# Patient Record
Sex: MALE | Race: OTHER | HISPANIC OR LATINO | Employment: FULL TIME | URBAN - METROPOLITAN AREA
[De-identification: names, ages, dates, MRNs, and addresses within clinical notes are randomized per-mention and may not be internally consistent; named-entity substitution may affect disease eponyms.]

---

## 2019-01-26 ENCOUNTER — HOSPITAL ENCOUNTER (EMERGENCY)
Facility: HOSPITAL | Age: 24
Discharge: HOME | End: 2019-01-26
Attending: EMERGENCY MEDICINE | Admitting: EMERGENCY MEDICINE

## 2019-01-26 VITALS
OXYGEN SATURATION: 100 % | DIASTOLIC BLOOD PRESSURE: 89 MMHG | WEIGHT: 315 LBS | SYSTOLIC BLOOD PRESSURE: 143 MMHG | RESPIRATION RATE: 18 BRPM | HEART RATE: 98 BPM | TEMPERATURE: 98.5 F

## 2019-01-26 DIAGNOSIS — J02.9 VIRAL PHARYNGITIS: Primary | ICD-10-CM

## 2019-01-26 PROCEDURE — 99282 EMERGENCY DEPT VISIT SF MDM: CPT

## 2019-01-26 NOTE — DISCHARGE INSTRUCTIONS
Pharyngitis   WHAT YOU NEED TO KNOW:   Pharyngitis, or sore throat, is inflammation of the tissues and structures in your pharynx (throat)  Pharyngitis is most often caused by bacteria  It may also be caused by a cold or flu virus  Other causes include smoking, allergies, or acid reflux  DISCHARGE INSTRUCTIONS:   Call 911 for any of the following:   · You have trouble breathing or swallowing because your throat is swollen or sore  Return to the emergency department if:   · You are drooling because it hurts too much to swallow  · Your fever is higher than 102? F (39?C) or lasts longer than 3 days  · You are confused  · You taste blood in your throat  Contact your healthcare provider if:   · Your throat pain gets worse  · You have a painful lump in your throat that does not go away after 5 days  · Your symptoms do not improve after 5 days  · You have questions or concerns about your condition or care  Medicines:  Viral pharyngitis will go away on its own without treatment  Your sore throat should start to feel better in 3 to 5 days for both viral and bacterial infections  You may need any of the following:  · Antibiotics  treat a bacterial infection  · NSAIDs , such as ibuprofen, help decrease swelling, pain, and fever  NSAIDs can cause stomach bleeding or kidney problems in certain people  If you take blood thinner medicine, always ask your healthcare provider if NSAIDs are safe for you  Always read the medicine label and follow directions  · Acetaminophen  decreases pain and fever  It is available without a doctor's order  Ask how much to take and how often to take it  Follow directions  Acetaminophen can cause liver damage if not taken correctly  · Take your medicine as directed  Contact your healthcare provider if you think your medicine is not helping or if you have side effects  Tell him or her if you are allergic to any medicine   Keep a list of the medicines, vitamins, and herbs you take  Include the amounts, and when and why you take them  Bring the list or the pill bottles to follow-up visits  Carry your medicine list with you in case of an emergency  Manage your symptoms:   · Gargle salt water  Mix ¼ teaspoon salt in an 8 ounce glass of warm water and gargle  This may help decrease swelling in your throat  · Drink liquids as directed  You may need to drink more liquids than usual  Liquids may help soothe your throat and prevent dehydration  Ask how much liquid to drink each day and which liquids are best for you  · Use a cool-steam humidifier  to help moisten the air in your room and calm your cough  · Soothe your throat  with cough drops, ice, soft foods, or popsicles  Prevent the spread of pharyngitis:  Cover your mouth and nose when you cough or sneeze  Do not share food or drinks  Wash your hands often  Use soap and water  If soap and water are unavailable, use an alcohol based hand   Follow up with your healthcare provider as directed:  Write down your questions so you remember to ask them during your visits  © 2017 [facility] Information is for End User's use only and may not be sold, redistributed or otherwise used for commercial purposes  All illustrations and images included in CareNotes® are the copyrighted property of [employer]  or [employer]  The above information is an  only  It is not intended as medical advice for individual conditions or treatments  Talk to your doctor, nurse or pharmacist before following any medical regimen to see if it is safe and effective for you  Pharyngitis   WHAT YOU NEED TO KNOW:   What is pharyngitis? Pharyngitis, or sore throat, is inflammation of the tissues and structures in your pharynx (throat)  Pharyngitis is most often caused by bacteria  It may also be caused by a cold or flu virus  Other causes include smoking, allergies, or acid reflux     What signs and symptoms may occur with pharyngitis? · Sore throat or pain when you swallow    · Fever, chills, and body aches    · Hoarse or raspy voice    · Cough, runny or stuffy nose, itchy or watery eyes    · Headache    · Upset stomach and loss of appetite    · Mild neck stiffness    · Swollen glands that feel like hard lumps when you touch your neck    · White and yellow pus-filled blisters in the back of your throat  How is pharyngitis diagnosed? Tell your healthcare provider about your symptoms  He may look inside your throat and feel your neck  You may also need the following tests:  · A throat culture  may show which germ is causing your sore throat  A cotton swab is rubbed against the back of your throat  · Blood tests  may be used to show if another medical condition is causing your sore throat  How is pharyngitis treated? Viral pharyngitis will go away on its own without treatment  Your sore throat should start to feel better in 3 to 5 days for both viral and bacterial infections  You may need any of the following:  · Antibiotics  treat a bacterial infection  · NSAIDs , such as ibuprofen, help decrease swelling, pain, and fever  NSAIDs can cause stomach bleeding or kidney problems in certain people  If you take blood thinner medicine, always ask your healthcare provider if NSAIDs are safe for you  Always read the medicine label and follow directions  · Acetaminophen  decreases pain and fever  It is available without a doctor's order  Ask how much to take and how often to take it  Follow directions  Acetaminophen can cause liver damage if not taken correctly  How can I manage my symptoms? · Gargle salt water  Mix ¼ teaspoon salt in an 8 ounce glass of warm water and gargle  This may help decrease swelling in your throat  · Drink liquids as directed  You may need to drink more liquids than usual  Liquids may help soothe your throat and prevent dehydration   Ask how much liquid to drink each day and which liquids are best for you  · Use a cool-steam humidifier  to help moisten the air in your room and decrease your cough  · Soothe your throat  with cough drops, ice, soft foods, or popsicles  How can I prevent the spread of pharyngitis? Cover your mouth and nose when you cough or sneeze  Do not share food or drinks  Wash your hands often  Use soap and water  If soap and water are unavailable, use an alcohol based hand   Call 911 for any of the following:   · You have trouble breathing or swallowing because your throat is swollen or sore  When should I seek immediate care? · You are drooling because it hurts too much to swallow  · Your fever is higher than 102? F (39?C) or lasts longer than 3 days  · You are confused  · You taste blood in your throat  When should I contact my healthcare provider? · Your throat pain gets worse  · You have a painful lump in your throat that does not go away after 5 days  · Your symptoms do not improve after 5 days  · You have questions or concerns about your condition or care  CARE AGREEMENT:   You have the right to help plan your care  Learn about your health condition and how it may be treated  Discuss treatment options with your caregivers to decide what care you want to receive  You always have the right to refuse treatment  The above information is an  only  It is not intended as medical advice for individual conditions or treatments  Talk to your doctor, nurse or pharmacist before following any medical regimen to see if it is safe and effective for you  © 2017 [employer] [employer] Information is for End User's use only and may not be sold, redistributed or otherwise used for commercial purposes  All illustrations and images included in CareNotes® are the copyrighted property of A [employer] A M [employer] Inc  or [employer]

## 2019-01-26 NOTE — ED PROVIDER NOTES
History  Chief Complaint   Patient presents with    Sore Throat     sore throat for two days, denies sick contacts, denies fevers     Patient presents for 2 days of sore throat  States it started with flu-like symptoms which are now gone  States he felt hot at home but did take his temperature  History provided by:  Patient   used: No    Sore Throat   Location:  Generalized  Quality:  Sore  Severity:  Moderate  Onset quality:  Gradual  Duration:  2 days  Timing:  Constant  Progression:  Worsening  Chronicity:  New  Relieved by:  Nothing  Worsened by:  Drinking and swallowing  Ineffective treatments:  OTC medications  Associated symptoms: chills, fever, postnasal drip and rhinorrhea    Associated symptoms: no cough, no rash and no trouble swallowing    Risk factors: no exposure to strep, no exposure to mono and no sick contacts        None       History reviewed  No pertinent past medical history  No past surgical history on file  History reviewed  No pertinent family history  I have reviewed and agree with the history as documented  Social History   Substance Use Topics    Smoking status: Not on file    Smokeless tobacco: Not on file    Alcohol use Not on file        Review of Systems   Constitutional: Positive for chills and fever  HENT: Positive for congestion, postnasal drip, rhinorrhea and sore throat  Negative for trouble swallowing  Respiratory: Negative for cough  Gastrointestinal: Negative for nausea and vomiting  Musculoskeletal: Positive for arthralgias and myalgias  Skin: Negative for color change, rash and wound  Allergic/Immunologic: Negative for immunocompromised state  All other systems reviewed and are negative  Physical Exam  Physical Exam   Constitutional: He is oriented to person, place, and time  He appears well-developed and well-nourished  Non-toxic appearance  He does not have a sickly appearance  He does not appear ill   No distress  HENT:   Head: Normocephalic and atraumatic  Nose: Nose normal    Mouth/Throat: Uvula is midline and mucous membranes are normal  No trismus in the jaw  No uvula swelling  Posterior oropharyngeal erythema present  No oropharyngeal exudate, posterior oropharyngeal edema or tonsillar abscesses  Tonsils are 1+ on the right  Tonsils are 1+ on the left  No tonsillar exudate  Eyes: Pupils are equal, round, and reactive to light  Conjunctivae are normal  Right eye exhibits no discharge  Left eye exhibits no discharge  No scleral icterus  Neck: Normal range of motion  Neck supple  No tracheal deviation present  Cardiovascular: Normal rate, regular rhythm, normal heart sounds and intact distal pulses  Exam reveals no friction rub  No murmur heard  Pulmonary/Chest: Effort normal and breath sounds normal  No stridor  No respiratory distress  He has no wheezes  He has no rales  Musculoskeletal: Normal range of motion  He exhibits no edema, tenderness or deformity  Lymphadenopathy:        Head (right side): Submandibular adenopathy present  Head (left side): Submandibular adenopathy present  He has cervical adenopathy  Neurological: He is alert and oriented to person, place, and time  Skin: Skin is warm and dry  He is not diaphoretic  Psychiatric: He has a normal mood and affect  Nursing note and vitals reviewed        Vital Signs  ED Triage Vitals   Temperature Pulse Respirations Blood Pressure SpO2   01/26/19 0042 01/26/19 0042 01/26/19 0042 01/26/19 0043 01/26/19 0042   98 5 °F (36 9 °C) 98 18 143/89 100 %      Temp src Heart Rate Source Patient Position - Orthostatic VS BP Location FiO2 (%)   -- -- -- -- --             Pain Score       --                  Vitals:    01/26/19 0042 01/26/19 0043   BP:  143/89   Pulse: 98        Visual Acuity      ED Medications  Medications - No data to display    Diagnostic Studies  Results Reviewed     None                 No orders to display Procedures  Procedures       Phone Contacts  ED Phone Contact    ED Course                               MDM  Number of Diagnoses or Management Options  Viral pharyngitis: new and requires workup  Patient Progress  Patient progress: stable    CritCare Time    Disposition  Final diagnoses:   Viral pharyngitis     Time reflects when diagnosis was documented in both MDM as applicable and the Disposition within this note     Time User Action Codes Description Comment    1/26/2019 12:58 AM Sue Lozano Add [J02 9] Viral pharyngitis       ED Disposition     ED Disposition Condition Comment    Discharge  Eddie Karimi discharge to home/self care  Condition at discharge: Good        Follow-up Information     Follow up With Specialties Details Why [address] Family Medicine Call in 1 week If symptoms persist, To schedule an appointment as soon as you can [address] [place] [address] Drive [address] [phone]            There are no discharge medications for this patient  No discharge procedures on file      ED Provider  Electronically Signed by           Aidan Solano DO  01/26/19 0130

## 2023-10-26 ENCOUNTER — OFFICE VISIT (OUTPATIENT)
Dept: FAMILY MEDICINE CLINIC | Facility: CLINIC | Age: 28
End: 2023-10-26

## 2023-10-26 ENCOUNTER — APPOINTMENT (OUTPATIENT)
Dept: LAB | Facility: HOSPITAL | Age: 28
End: 2023-10-26
Payer: COMMERCIAL

## 2023-10-26 VITALS
WEIGHT: 315 LBS | HEART RATE: 105 BPM | DIASTOLIC BLOOD PRESSURE: 80 MMHG | TEMPERATURE: 98.7 F | SYSTOLIC BLOOD PRESSURE: 140 MMHG | OXYGEN SATURATION: 97 % | RESPIRATION RATE: 16 BRPM | HEIGHT: 74 IN | BODY MASS INDEX: 40.43 KG/M2

## 2023-10-26 DIAGNOSIS — I10 PRIMARY HYPERTENSION: ICD-10-CM

## 2023-10-26 DIAGNOSIS — Z87.891 HISTORY OF TOBACCO USE: ICD-10-CM

## 2023-10-26 DIAGNOSIS — Z11.4 SCREENING FOR HIV (HUMAN IMMUNODEFICIENCY VIRUS): ICD-10-CM

## 2023-10-26 DIAGNOSIS — Z23 ENCOUNTER FOR IMMUNIZATION: ICD-10-CM

## 2023-10-26 DIAGNOSIS — E66.01 CLASS 3 SEVERE OBESITY DUE TO EXCESS CALORIES WITH BODY MASS INDEX (BMI) OF 50.0 TO 59.9 IN ADULT, UNSPECIFIED WHETHER SERIOUS COMORBIDITY PRESENT (HCC): ICD-10-CM

## 2023-10-26 DIAGNOSIS — Z11.59 NEED FOR HEPATITIS C SCREENING TEST: ICD-10-CM

## 2023-10-26 DIAGNOSIS — Z00.00 ANNUAL PHYSICAL EXAM: ICD-10-CM

## 2023-10-26 DIAGNOSIS — Z76.89 ENCOUNTER TO ESTABLISH CARE: Primary | ICD-10-CM

## 2023-10-26 DIAGNOSIS — Z72.89 CURRENT VAPING ON SOME DAYS: ICD-10-CM

## 2023-10-26 DIAGNOSIS — Z02.1 PRE-EMPLOYMENT EXAMINATION: ICD-10-CM

## 2023-10-26 PROBLEM — S06.0X9A CONCUSSION WITH LOSS OF CONSCIOUSNESS: Status: RESOLVED | Noted: 2017-04-18 | Resolved: 2023-10-26

## 2023-10-26 PROBLEM — S06.0X9A CONCUSSION WITH LOSS OF CONSCIOUSNESS: Status: ACTIVE | Noted: 2017-04-18

## 2023-10-26 PROBLEM — E66.813 CLASS 3 SEVERE OBESITY DUE TO EXCESS CALORIES WITH BODY MASS INDEX (BMI) OF 50.0 TO 59.9 IN ADULT (HCC): Status: ACTIVE | Noted: 2023-10-26

## 2023-10-26 LAB
BASOPHILS # BLD AUTO: 0.05 THOUSANDS/ÂΜL (ref 0–0.1)
BASOPHILS NFR BLD AUTO: 0 % (ref 0–1)
EOSINOPHIL # BLD AUTO: 0.13 THOUSAND/ÂΜL (ref 0–0.61)
EOSINOPHIL NFR BLD AUTO: 1 % (ref 0–6)
ERYTHROCYTE [DISTWIDTH] IN BLOOD BY AUTOMATED COUNT: 13.1 % (ref 11.6–15.1)
HCT VFR BLD AUTO: 41.7 % (ref 36.5–49.3)
HGB BLD-MCNC: 13.9 G/DL (ref 12–17)
HIV 1+2 AB+HIV1 P24 AG SERPL QL IA: NORMAL
HIV 2 AB SERPL QL IA: NORMAL
HIV1 AB SERPL QL IA: NORMAL
HIV1 P24 AG SERPL QL IA: NORMAL
IMM GRANULOCYTES # BLD AUTO: 0.03 THOUSAND/UL (ref 0–0.2)
IMM GRANULOCYTES NFR BLD AUTO: 0 % (ref 0–2)
LYMPHOCYTES # BLD AUTO: 3.01 THOUSANDS/ÂΜL (ref 0.6–4.47)
LYMPHOCYTES NFR BLD AUTO: 22 % (ref 14–44)
MCH RBC QN AUTO: 29.4 PG (ref 26.8–34.3)
MCHC RBC AUTO-ENTMCNC: 33.3 G/DL (ref 31.4–37.4)
MCV RBC AUTO: 88 FL (ref 82–98)
MONOCYTES # BLD AUTO: 0.84 THOUSAND/ÂΜL (ref 0.17–1.22)
MONOCYTES NFR BLD AUTO: 6 % (ref 4–12)
NEUTROPHILS # BLD AUTO: 9.44 THOUSANDS/ÂΜL (ref 1.85–7.62)
NEUTS SEG NFR BLD AUTO: 71 % (ref 43–75)
NRBC BLD AUTO-RTO: 0 /100 WBCS
PLATELET # BLD AUTO: 384 THOUSANDS/UL (ref 149–390)
PMV BLD AUTO: 9.9 FL (ref 8.9–12.7)
RBC # BLD AUTO: 4.73 MILLION/UL (ref 3.88–5.62)
TSH SERPL DL<=0.05 MIU/L-ACNC: 1.64 UIU/ML (ref 0.45–4.5)
WBC # BLD AUTO: 13.5 THOUSAND/UL (ref 4.31–10.16)

## 2023-10-26 PROCEDURE — 36415 COLL VENOUS BLD VENIPUNCTURE: CPT

## 2023-10-26 PROCEDURE — 86803 HEPATITIS C AB TEST: CPT

## 2023-10-26 PROCEDURE — 85025 COMPLETE CBC W/AUTO DIFF WBC: CPT

## 2023-10-26 PROCEDURE — 87389 HIV-1 AG W/HIV-1&-2 AB AG IA: CPT

## 2023-10-26 PROCEDURE — 84443 ASSAY THYROID STIM HORMONE: CPT

## 2023-10-26 RX ORDER — LISINOPRIL 10 MG/1
10 TABLET ORAL DAILY
Qty: 30 TABLET | Refills: 2 | Status: SHIPPED | OUTPATIENT
Start: 2023-10-26

## 2023-10-26 NOTE — ASSESSMENT & PLAN NOTE
Hearing screen passed. Blood pressure borderline.   - Complete labs. - Pt to upload form to [employer] to print or drop off copy.

## 2023-10-26 NOTE — PROGRESS NOTES
[address] [facility]    NAME: Bob Avery  AGE: 29 y.o. SEX: male  : 1995     DATE: 10/26/2023     Assessment and Plan:     Problem List Items Addressed This Visit       History of tobacco use     Quit within past 1-2 months, now vaping occasionally. - Encouraged continued abstinence from tobacco use. Class 3 severe obesity due to excess calories with body mass index (BMI) of 50.0 to 59.9 in adult [facility])     Body mass index is 57.52 kg/m². The BMI is above normal.   - Nutrition recommendations include decreasing portion sizes, encouraging healthy choices of fruits and vegetables, limiting drinks that contain sugar and moderation in carbohydrate intake. - Exercise recommendations include moderate physical activity 150 minutes/week. Relevant Orders    CBC and differential    Comprehensive metabolic panel    Hemoglobin A1C    Lipid panel    TSH, 3rd generation with Free T4 reflex    Primary hypertension     BP borderline in office today at 140/80. Pt has been previously told he has high blood pressure but has not been prescribed medication. Pt asymptomatic.   - Start lisinopril 10 mg daily. - Avoid added salt in diet, low salt diet recommended. - Recommend daily physical activity for weight loss. - Follow up in 4 weeks for BP check. Relevant Medications    lisinopril (ZESTRIL) 10 mg tablet    Pre-employment examination     Hearing screen passed. Blood pressure borderline.   - Complete labs. - Pt to upload form to [employer] to print or drop off copy.           Current vaping on some days     Other Visit Diagnoses       Encounter to establish care    -  Primary    Annual physical exam        Need for hepatitis C screening test        Relevant Orders    Hepatitis C Antibody    Screening for HIV (human immunodeficiency virus)        Relevant Orders    HIV 1/2 AG/AB w Reflex [facility] for 2 yr old and above    Encounter for immunization        Relevant Orders    influenza vaccine, quadrivalent, 0.5 mL, preservative-free, for adult and pediatric patients 6 mos+ (AFLURIA, FLUARIX, FLULAVAL, FLUZONE) (Completed)            Immunizations and preventive care screenings were discussed with patient today. Appropriate education was printed on patient's after visit summary. Counseling:  Dental Health: discussed importance of regular tooth brushing, flossing, and dental visits. Exercise: the importance of regular exercise/physical activity was discussed. Recommend exercise 3-5 times per week for at least 30 minutes. BMI Counseling: Body mass index is 57.52 kg/m². The BMI is above normal. Nutrition recommendations include decreasing portion sizes, encouraging healthy choices of fruits and vegetables, limiting drinks that contain sugar and moderation in carbohydrate intake. Exercise recommendations include moderate physical activity 150 minutes/week. Rationale for BMI follow-up plan is due to patient being overweight or obese. Depression Screening and Follow-up Plan: Patient was screened for depression during today's encounter. They screened negative with a PHQ-2 score of 0. Return in about 4 weeks (around 11/23/2023) for Follow up BP. Chief Complaint:     Chief Complaint   Patient presents with    New Patient Visit      History of Present Illness:     Adult Annual Physical   Abbey Umaña presents to the office to establish care, for a physical exam, and completion of form for employment. 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 Pt denies any history of seizures, syncope, drug or alcohol use, or mental illness. He denies chest pain, palpitations, SOB, or dyspnea on exertion. Diet and Physical Activity  Diet/Nutrition: well balanced diet. Exercise: no formal exercise.       Depression Screening  PHQ-2/9 Depression Screening    Little interest or pleasure in doing things: 0 - not at all  Feeling down, depressed, or hopeless: 0 - not at all  PHQ-2 Score: 0  PHQ-2 Interpretation: Negative depression screen       General Health  Sleep: sleeps well. Hearing: decreased - bilateral.  Vision: no vision problems, most recent eye exam <1 year ago, and wears glasses. Dental: no dental visits for >1 year.  Health  History of STDs?: no.         Review of Systems:     Review of Systems   Constitutional:  Negative for fatigue and fever. HENT:  Negative for congestion, hearing loss, sore throat and trouble swallowing. Eyes:  Negative for visual disturbance. Respiratory:  Negative for cough, shortness of breath and wheezing. Cardiovascular:  Negative for chest pain, palpitations and leg swelling. Gastrointestinal:  Negative for abdominal pain, blood in stool, constipation, diarrhea, nausea and vomiting. Genitourinary:  Negative for difficulty urinating and dysuria. Neurological:  Negative for dizziness, seizures, syncope, weakness, light-headedness, numbness and headaches. Psychiatric/Behavioral:  Negative for dysphoric mood and sleep disturbance. The patient is not nervous/anxious. All other systems reviewed and are negative. Past Medical History:     Past Medical History:   Diagnosis Date    Concussion with loss of consciousness 2017    Last Assessment & Plan: Formatting of this note might be different from the original. Requesting records from [facility]      Past Surgical History:     History reviewed. No pertinent surgical history.    Social History:     Social History     Socioeconomic History    Marital status: Single     Spouse name: None    Number of children: 0    Years of education: None    Highest education level: None   Occupational History    None   Tobacco Use    Smoking status: Former     Packs/day: 0.25     Types: Cigarettes     Start date:      Quit date: 2023     Years since quittin.1 Smokeless tobacco: Never   Vaping Use    Vaping Use: Some days   Substance and Sexual Activity    Alcohol use: Never    Drug use: Never    Sexual activity: None   Other Topics Concern    None   Social History Narrative    None     Social Determinants of Health     Financial Resource Strain: Low Risk  (10/26/2023)    Overall Financial Resource Strain (CARDIA)     Difficulty of Paying Living Expenses: Not hard at all   Food Insecurity: No Food Insecurity (10/26/2023)    Hunger Vital Sign     Worried About Running Out of Food in the Last Year: Never true     Ran Out of Food in the Last Year: Never true   Transportation Needs: No Transportation Needs (10/26/2023)    PRAPARE - Transportation     Lack of Transportation (Medical): No     Lack of Transportation (Non-Medical): No   Physical Activity: Not on file   Stress: Not on file   Social Connections: Not on file   Intimate Partner Violence: Not on file   Housing Stability: Not on file      Family History:     Family History   Problem Relation Age of Onset    Diabetes Paternal Grandfather       Current Medications:     Current Outpatient Medications   Medication Sig Dispense Refill    lisinopril (ZESTRIL) 10 mg tablet Take 1 tablet (10 mg total) by mouth daily 30 tablet 2     No current facility-administered medications for this visit. Allergies: Allergies   Allergen Reactions    Tuberculin Ppd Other (See Comments)     Other reaction(s): Positive skin tests in past      Physical Exam:     /80 (BP Location: Right arm, Patient Position: Sitting, Cuff Size: Extra-Large)   Pulse 105   Temp 98.7 °F (37.1 °C) (Temporal)   Resp 16   Ht 6' 2" (1.88 m)   Wt (!) 203 kg (448 lb)   SpO2 97%   BMI 57.52 kg/m²     Physical Exam  Vitals reviewed. Constitutional:       General: He is not in acute distress. Appearance: He is morbidly obese. He is not ill-appearing or diaphoretic. HENT:      Head: Normocephalic and atraumatic.       Right Ear: Tympanic membrane, ear canal and external ear normal.      Left Ear: Tympanic membrane, ear canal and external ear normal.      Nose: Nose normal.      Mouth/Throat:      Mouth: Mucous membranes are moist.      Pharynx: Oropharynx is clear. Eyes:      General: Lids are normal.      Conjunctiva/sclera: Conjunctivae normal.      Pupils: Pupils are equal, round, and reactive to light. Neck:      Thyroid: No thyromegaly or thyroid tenderness. Cardiovascular:      Rate and Rhythm: Normal rate and regular rhythm. Pulses: Normal pulses. Heart sounds: Normal heart sounds. No murmur heard. Pulmonary:      Effort: Pulmonary effort is normal. No tachypnea. Breath sounds: Normal breath sounds. No decreased breath sounds or wheezing. Abdominal:      General: Bowel sounds are normal. There is no distension. Palpations: Abdomen is soft. Tenderness: There is no abdominal tenderness. There is no guarding. Musculoskeletal:      Cervical back: Neck supple. Right lower leg: No edema. Left lower leg: No edema. Lymphadenopathy:      Cervical: No cervical adenopathy. Skin:     General: Skin is warm and dry. Capillary Refill: Capillary refill takes less than 2 seconds. Neurological:      Mental Status: He is alert and oriented to person, place, and time. Cranial Nerves: No cranial nerve deficit. Motor: Motor function is intact. No weakness. Gait: Gait is intact.    Psychiatric:         Attention and Perception: Attention normal.         Mood and Affect: Mood and affect normal.         Speech: Speech normal.         Behavior: Behavior normal.       Hearing Screening    500Hz 1000Hz 2000Hz 4000Hz   Right ear 20 20 20 20   Left ear 20 20 20 20          [facility], [address] [facility]

## 2023-10-26 NOTE — ASSESSMENT & PLAN NOTE
BP borderline in office today at 140/80. Pt has been previously told he has high blood pressure but has not been prescribed medication. Pt asymptomatic.   - Start lisinopril 10 mg daily. - Avoid added salt in diet, low salt diet recommended. - Recommend daily physical activity for weight loss. - Follow up in 4 weeks for BP check.

## 2023-10-26 NOTE — ASSESSMENT & PLAN NOTE
Quit within past 1-2 months, now vaping occasionally. - Encouraged continued abstinence from tobacco use.

## 2023-10-26 NOTE — ASSESSMENT & PLAN NOTE
Body mass index is 57.52 kg/m². The BMI is above normal.   - Nutrition recommendations include decreasing portion sizes, encouraging healthy choices of fruits and vegetables, limiting drinks that contain sugar and moderation in carbohydrate intake. - Exercise recommendations include moderate physical activity 150 minutes/week.

## 2023-10-26 NOTE — PATIENT INSTRUCTIONS
DASH Eating Plan   WHAT YOU NEED TO KNOW:   What is the DASH Eating Plan? The DASH (Dietary Approaches to Stop Hypertension) Eating Plan is designed to help prevent or lower high blood pressure. It can also help to lower LDL (bad) cholesterol and decrease your risk for heart disease. The plan is low in sodium, sugar, unhealthy fats, and total fat. It is high in potassium, calcium, magnesium, and fiber. These nutrients are added when you eat more fruits, vegetables, and whole grains. With the DASH eating plan, you need to eat a certain number of servings from each food group. This will help you get enough of certain nutrients and limit others. The amount of servings you should eat depends on how many calories you need. Your dietitian can help you create meal plans with the right number of servings for each food group. What do I need to know about sodium? Your dietitian will tell you how much sodium is safe for you to have each day. People with high blood pressure should have no more than 1,500 to 2,300 mg of sodium in a day. A teaspoon (tsp) of salt has 2,300 mg of sodium. This may seem like a difficult goal, but small changes to the foods you eat can make a big difference. Your healthcare provider or dietitian can help you create a meal plan that follows your sodium limit. Read food labels. Food labels can help you choose foods that are low in sodium. The amount of sodium is listed in milligrams (mg). The % Daily Value (DV) column tells you how much of your daily needs are met by 1 serving of the food for each nutrient listed. Choose foods that have less than 5% of the DV of sodium. These foods are considered low in sodium. Foods that have 20% or more of the DV of sodium are considered high in sodium. Avoid foods that have more than 300 mg of sodium in each serving. Choose foods that say low-sodium, reduced-sodium, or no salt added on the food label. Limit added salt.   Do not salt food at the table if you add salt when you cook. Use herbs and spices, such as onions, garlic, and salt-free seasonings to add flavor. Try lemon or lime juice or vinegar to add a tart flavor. Use hot peppers or a small amount of hot pepper sauce to add a spicy flavor. Limit foods high in added salt, such as the following:    Seasonings made with salt, such as garlic salt, celery salt, onion salt, seasoned salt, meat tenderizers, and monosodium glutamate (MSG)    Miso soup and canned or dried soup mixes    Regular soy sauce, barbecue sauce, teriyaki sauce, steak sauce, Worcestershire sauce, and most flavored vinegars    Snack foods, such as salted chips, popcorn, pretzels, pork rinds, salted crackers, and salted nuts    Frozen foods, such as dinners, entrees, vegetables with sauces, and breaded meats    Ask about salt substitutes. Ask your healthcare provider if you may use salt substitutes. Some salt substitutes have ingredients that can be harmful if you have certain health conditions. Choose foods carefully at restaurants. Meals from restaurants, especially fast food restaurants, are often high in sodium. Some restaurants have nutrition information that tells you the amount of sodium in their foods. Ask to have your food prepared with less, or no salt. What do I need to know about fats? Healthy fats include unsaturated fats and omega-3 fatty acids. Unhealthy fats include saturated fats and trans fats.   Include healthy fats, such as the following:      Cooking oils, such as soybean, canola, olive, or sunflower    Fatty fish, such as salmon, tuna, mackerel, or sardines    Flaxseed oil or ground flaxseed    ½ cup of cooked beans, such as black beans, kidney beans, or lopez beans    1½ ounces of low-sodium nuts, such as almonds or walnuts    Low-sugar, low-sodium peanut butter    Seeds such as melody seeds or sunflower seeds       Limit or do not have unhealthy fats, such as the following:      Foods that contain fat from animals, such as fatty meats, whole milk, butter, and cream    Shortening, stick margarine, palm oil, and coconut oil    Full-fat or creamy salad dressing    Creamy soup    Crackers, chips, and baked goods made with margarine or shortening    Foods that are fried in unhealthy fats    Gravy and sauces, such as Raz or cheese sauces    What do I need to know about carbohydrates (carbs)? All carbs break down into sugar. Complex carbs contain more fiber than simple carbs. This means complex carbs go into the bloodstream more slowly and cause less of a blood sugar spike. Try to include more complex carbs and fewer simple carbs. Include complex carbs, such as the followin slice of whole-grain bread    1 ounce of dry cereal that does not contain added sugar    ½ cup of cooked oatmeal    2 ounces of cooked whole-grain pasta    ½ cup of cooked brown rice    Limit or do not have simple carbs, such as the following:      [employer], such as doughnuts, pastries, and cookies    Mixes for cornbread and biscuits    White rice and pasta mixes, such as boxed macaroni and cheese    Instant and cold cereals that contain sugar    Jelly, jam, and ice cream that contain sugar    Condiments such as ketchup    Drinks high in sugar, such as soft drinks, lemonade, and fruit juice    What do I need to know about vegetables and fruits? Vegetables and fruits can be fresh, frozen, or canned. If possible, try to choose low-sodium canned options.   Include a variety of vegetables and fruits, such as the followin medium apple, pear, or peach (about ½ cup chopped)    ½ small banana    ½ cup berries, such as blueberries, strawberries, or blackberries    1 cup of raw leafy greens, such as lettuce, spinach, kale, or italo greens    ½ cup of frozen or canned (no added salt) vegetables, such as green beans    ½ cup of fresh, frozen, or canned fruit (canned in light syrup or fruit juice)    ½ cup of vegetable or fruit juice    Limit or do not have vegetables and fruits made in the following ways:      Frozen fruit such as cherries that have added sugar    Fruit in cream or butter sauce    Canned vegetables that are high in sodium    Sauerkraut, pickled vegetables, and other foods prepared in brine    Fried vegetables or vegetables in butter or high-fat sauces    What do I need to know about protein foods? Include lean or low-fat protein foods, such as the following:      Poultry (chicken, turkey) with no skin    Fish (especially fatty fish, such as salmon, fresh tuna, or mackerel)    Lean beef and pork (loin, round, extra lean hamburger)    Egg whites and egg substitutes    1 cup of nonfat (skim) or 1% milk    1½ ounces of fat-free or low-fat cheese    6 ounces of nonfat or low-fat yogurt    Limit or do not have high-fat protein foods, such as the following:      Smoked or cured meat, such as corned beef, macias, ham, hot dogs, and sausage    Canned beans and canned meats or spreads, such as potted meats, sardines, anchovies, and imitation seafood    Deli or lunch meats, such as bologna, ham, turkey, and roast beef    High-fat meat (T-bone steak, regular hamburger, and ribs)    Whole eggs and egg yolks    Whole milk, 2% milk, and cream    Regular cheese and processed cheese    What other guidelines should I follow? Maintain a healthy weight. Your risk for heart disease is higher if you have extra weight. Ask your healthcare provider what a healthy weight is for you. Your provider may suggest that you lose weight. You can lose weight by eating fewer calories and foods that have added sugars and fat. The DASH meal plan can help you do this. Decrease calories by eating smaller portions at each meal and fewer snacks. Ask your provider for more information about how to lose weight. Exercise regularly. Regular exercise can help you reach or maintain a healthy weight.  Regular exercise can also help decrease your blood pressure and improve your cholesterol levels. Get 30 minutes or more of moderate exercise each day of the week. To lose weight, get at least 60 minutes of exercise. Talk to your healthcare provider about the best exercise program for you. Limit alcohol. Women should limit alcohol to 1 drink a day. Men should limit alcohol to 2 drinks a day. A drink of alcohol is 12 ounces of beer, 5 ounces of wine, or 1½ ounces of liquor. Where can I find more information? National [facility], [facility] and [address]  [address] [address]  [place] [address]  Phone: [phone]  Web Address: [email or web address]    CARE AGREEMENT:   You have the right to help plan your care. Discuss treatment options with your healthcare provider to decide what care you want to receive. You always have the right to refuse treatment. The above information is an  only. It is not intended as medical advice for individual conditions or treatments. Talk to your doctor, nurse or pharmacist before following any medical regimen to see if it is safe and effective for you. © Copyright [employer] 2023 Information is for End User's use only and may not be sold, redistributed or otherwise used for commercial purposes. Wellness Visit for Adults   WHAT YOU NEED TO KNOW:   What is a wellness visit? A wellness visit is when you see your healthcare provider to get screened for health problems. Your healthcare provider will also give you advice on how to stay healthy. Write down your questions so you remember to ask them. Ask your healthcare provider how often you should have a wellness visit. What happens at a wellness visit? Your healthcare provider will ask about your health, and your family history of health problems. This includes high blood pressure, heart disease, and cancer. He or she will ask if you have symptoms that concern you, if you smoke, and about your mood.  You may also be asked about your intake of medicines, supplements, food, and alcohol. Any of the following may be done: Your weight  will be checked. Your height may also be checked so your body mass index (BMI) can be calculated. Your BMI shows if you are at a healthy weight. Your blood pressure  and heart rate will be checked. Your temperature may also be checked. Blood and urine tests  may be done. Blood tests may be done to check your cholesterol levels. Abnormal cholesterol levels increase your risk for heart disease and stroke. You may also need a blood or urine test to check for diabetes if you are at increased risk. Urine tests may be done to look for signs of an infection or kidney disease. A physical exam  includes checking your heartbeat and lungs with a stethoscope. Your healthcare provider may also check your skin to look for sun damage. Screening tests  may be recommended. A screening test is done to check for diseases that may not cause symptoms. The screening tests you may need depend on your age, gender, family history, and lifestyle habits. For example, colorectal screening may be recommended if you are 48years old or older. What screening tests do I need if I am a woman? A Pap smear  is used to screen for cervical cancer. Pap smears are usually done every 3 to 5 years depending on your age. You may need them more often if you have had abnormal Pap smear test results in the past. Ask your healthcare provider how often you should have a Pap smear. A mammogram  is an x-ray of your breasts to screen for breast cancer. Experts recommend mammograms every 2 years starting at age 48 years. You may need a mammogram at age 52 years or younger if you have an increased risk for breast cancer. Talk to your healthcare provider about when you should start having mammograms and how often you need them. What vaccines might I need? Get an influenza vaccine  every year.  The influenza vaccine protects you from the flu. Several types of viruses cause the flu. The viruses change over time, so new vaccines are made each year. Get a tetanus-diphtheria (Td) booster vaccine  every 10 years. This vaccine protects you against tetanus and diphtheria. Tetanus is a severe infection that may cause painful muscle spasms and lockjaw. Diphtheria is a severe bacterial infection that causes a thick covering in the back of your mouth and throat. Get a human papillomavirus (HPV) vaccine  if you are female and aged 23 to 32 or male 23 to 24 and never received it. This vaccine protects you from HPV infection. HPV is the most common infection spread by sexual contact. HPV may also cause vaginal, penile, and anal cancers. Get a pneumococcal vaccine  if you are aged 72 years or older. The pneumococcal vaccine is an injection given to protect you from pneumococcal disease. Pneumococcal disease is an infection caused by pneumococcal bacteria. The infection may cause pneumonia, meningitis, or an ear infection. Get a shingles vaccine  if you are 60 or older, even if you have had shingles before. The shingles vaccine is an injection to protect you from the varicella-zoster virus. This is the same virus that causes chickenpox. Shingles is a painful rash that develops in people who had chickenpox or have been exposed to the virus. How can I eat healthy? My Plate is a model for planning healthy meals. It shows the types and amounts of foods that should go on your plate. Fruits and vegetables make up about half of your plate, and grains and protein make up the other half. A serving of dairy is included on the side of your plate. The amount of calories and serving sizes you need depends on your age, gender, weight, and height. Examples of healthy foods are listed below:  Eat a variety of vegetables  such as dark green, red, and orange vegetables.  You can also include canned vegetables low in sodium (salt) and frozen vegetables without added butter or sauces. Eat a variety of fresh fruits , canned fruit in 100% juice, frozen fruit, and dried fruit. Include whole grains. At least half of the grains you eat should be whole grains. Examples include whole-wheat bread, wheat pasta, brown rice, and whole-grain cereals such as oatmeal.    Eat a variety of protein foods such as seafood (fish and shellfish), lean meat, and poultry without skin (turkey and chicken). Examples of lean meats include pork leg, shoulder, or tenderloin, and beef round, sirloin, tenderloin, and extra lean ground beef. Other protein foods include eggs and egg substitutes, beans, peas, soy products, nuts, and seeds. Choose low-fat dairy products such as skim or 1% milk or low-fat yogurt, cheese, and cottage cheese. Limit unhealthy fats  such as butter, hard margarine, and shortening. How much exercise do I need? Exercise at least 30 minutes per day on most days of the week. Some examples of exercise include walking, biking, dancing, and swimming. You can also fit in more physical activity by taking the stairs instead of the elevator or parking farther away from stores. Include muscle strengthening activities 2 days each week. Regular exercise provides many health benefits. It helps you manage your weight, and decreases your risk for type 2 diabetes, heart disease, stroke, and high blood pressure. Exercise can also help improve your mood. Ask your healthcare provider about the best exercise plan for you. What are some general health and safety guidelines I should follow? Do not smoke. Nicotine and other chemicals in cigarettes and cigars can cause lung damage. Ask your healthcare provider for information if you currently smoke and need help to quit. E-cigarettes or smokeless tobacco still contain nicotine. Talk to your healthcare provider before you use these products. Limit alcohol.   A drink of alcohol is 12 ounces of beer, 5 ounces of wine, or 1½ ounces of liquor. Lose weight, if needed. Being overweight increases your risk of certain health conditions. These include heart disease, high blood pressure, type 2 diabetes, and certain types of cancer. Protect your skin. Do not sunbathe or use tanning beds. Use sunscreen with a SPF 15 or higher. Apply sunscreen at least 15 minutes before you go outside. Reapply sunscreen every 2 hours. Wear protective clothing, hats, and sunglasses when you are outside. Drive safely. Always wear your seatbelt. Make sure everyone in your car wears a seatbelt. A seatbelt can save your life if you are in an accident. Do not use your cell phone when you are driving. This could distract you and cause an accident. Pull over if you need to make a call or send a text message. Practice safe sex. Use latex condoms if are sexually active and have more than one partner. Your healthcare provider may recommend screening tests for sexually transmitted infections (STIs). Wear helmets, lifejackets, and protective gear. Always wear a helmet when you ride a bike or motorcycle, go skiing, or play sports that could cause a head injury. Wear protective equipment when you play sports. Wear a lifejacket when you are on a boat or doing water sports. CARE AGREEMENT:   You have the right to help plan your care. Learn about your health condition and how it may be treated. Discuss treatment options with your healthcare providers to decide what care you want to receive. You always have the right to refuse treatment. The above information is an  only. It is not intended as medical advice for individual conditions or treatments. Talk to your doctor, nurse or pharmacist before following any medical regimen to see if it is safe and effective for you. © Copyright [employer] 2023 Information is for End User's use only and may not be sold, redistributed or otherwise used for commercial purposes.

## 2023-10-27 LAB — HCV AB SER QL: NORMAL

## 2023-10-29 DIAGNOSIS — D72.9 NEUTROPHILIA: Primary | ICD-10-CM

## 2023-12-22 ENCOUNTER — APPOINTMENT (OUTPATIENT)
Dept: LAB | Facility: CLINIC | Age: 28
End: 2023-12-22
Payer: COMMERCIAL

## 2023-12-22 ENCOUNTER — OFFICE VISIT (OUTPATIENT)
Dept: FAMILY MEDICINE CLINIC | Facility: CLINIC | Age: 28
End: 2023-12-22

## 2023-12-22 VITALS
SYSTOLIC BLOOD PRESSURE: 140 MMHG | HEART RATE: 100 BPM | DIASTOLIC BLOOD PRESSURE: 90 MMHG | TEMPERATURE: 98 F | HEIGHT: 74 IN | BODY MASS INDEX: 40.43 KG/M2 | RESPIRATION RATE: 16 BRPM | OXYGEN SATURATION: 98 % | WEIGHT: 315 LBS

## 2023-12-22 DIAGNOSIS — I10 PRIMARY HYPERTENSION: ICD-10-CM

## 2023-12-22 DIAGNOSIS — E66.01 CLASS 3 SEVERE OBESITY DUE TO EXCESS CALORIES WITH BODY MASS INDEX (BMI) OF 50.0 TO 59.9 IN ADULT, UNSPECIFIED WHETHER SERIOUS COMORBIDITY PRESENT (HCC): ICD-10-CM

## 2023-12-22 DIAGNOSIS — D72.9 NEUTROPHILIA: ICD-10-CM

## 2023-12-22 DIAGNOSIS — E66.01 CLASS 3 SEVERE OBESITY DUE TO EXCESS CALORIES WITH SERIOUS COMORBIDITY AND BODY MASS INDEX (BMI) OF 50.0 TO 59.9 IN ADULT (HCC): ICD-10-CM

## 2023-12-22 DIAGNOSIS — Z02.1 PRE-EMPLOYMENT EXAMINATION: Primary | ICD-10-CM

## 2023-12-22 LAB
ALBUMIN SERPL BCP-MCNC: 4.8 G/DL (ref 3.5–5)
ALP SERPL-CCNC: 109 U/L (ref 34–104)
ALT SERPL W P-5'-P-CCNC: 69 U/L (ref 7–52)
ANION GAP SERPL CALCULATED.3IONS-SCNC: 10 MMOL/L
AST SERPL W P-5'-P-CCNC: 42 U/L (ref 13–39)
BASOPHILS # BLD AUTO: 0.03 THOUSANDS/ÂΜL (ref 0–0.1)
BASOPHILS NFR BLD AUTO: 0 % (ref 0–1)
BILIRUB SERPL-MCNC: 0.65 MG/DL (ref 0.2–1)
BUN SERPL-MCNC: 11 MG/DL (ref 5–25)
CALCIUM SERPL-MCNC: 9.6 MG/DL (ref 8.4–10.2)
CHLORIDE SERPL-SCNC: 104 MMOL/L (ref 96–108)
CHOLEST SERPL-MCNC: 153 MG/DL
CO2 SERPL-SCNC: 25 MMOL/L (ref 21–32)
CREAT SERPL-MCNC: 0.77 MG/DL (ref 0.6–1.3)
EOSINOPHIL # BLD AUTO: 0.13 THOUSAND/ÂΜL (ref 0–0.61)
EOSINOPHIL NFR BLD AUTO: 1 % (ref 0–6)
ERYTHROCYTE [DISTWIDTH] IN BLOOD BY AUTOMATED COUNT: 12.9 % (ref 11.6–15.1)
EST. AVERAGE GLUCOSE BLD GHB EST-MCNC: 111 MG/DL
GFR SERPL CREATININE-BSD FRML MDRD: 123 ML/MIN/1.73SQ M
GLUCOSE SERPL-MCNC: 93 MG/DL (ref 65–140)
HBA1C MFR BLD: 5.5 %
HCT VFR BLD AUTO: 45.8 % (ref 36.5–49.3)
HDLC SERPL-MCNC: 43 MG/DL
HGB BLD-MCNC: 15 G/DL (ref 12–17)
IMM GRANULOCYTES # BLD AUTO: 0.04 THOUSAND/UL (ref 0–0.2)
IMM GRANULOCYTES NFR BLD AUTO: 0 % (ref 0–2)
LDLC SERPL CALC-MCNC: 84 MG/DL (ref 0–100)
LYMPHOCYTES # BLD AUTO: 1.69 THOUSANDS/ÂΜL (ref 0.6–4.47)
LYMPHOCYTES NFR BLD AUTO: 15 % (ref 14–44)
MCH RBC QN AUTO: 29.5 PG (ref 26.8–34.3)
MCHC RBC AUTO-ENTMCNC: 32.8 G/DL (ref 31.4–37.4)
MCV RBC AUTO: 90 FL (ref 82–98)
MONOCYTES # BLD AUTO: 0.73 THOUSAND/ÂΜL (ref 0.17–1.22)
MONOCYTES NFR BLD AUTO: 7 % (ref 4–12)
NEUTROPHILS # BLD AUTO: 8.55 THOUSANDS/ÂΜL (ref 1.85–7.62)
NEUTS SEG NFR BLD AUTO: 77 % (ref 43–75)
NONHDLC SERPL-MCNC: 110 MG/DL
NRBC BLD AUTO-RTO: 0 /100 WBCS
PLATELET # BLD AUTO: 379 THOUSANDS/UL (ref 149–390)
PMV BLD AUTO: 10.5 FL (ref 8.9–12.7)
POTASSIUM SERPL-SCNC: 3.6 MMOL/L (ref 3.5–5.3)
PROT SERPL-MCNC: 8.5 G/DL (ref 6.4–8.4)
RBC # BLD AUTO: 5.09 MILLION/UL (ref 3.88–5.62)
SODIUM SERPL-SCNC: 139 MMOL/L (ref 135–147)
TRIGL SERPL-MCNC: 130 MG/DL
WBC # BLD AUTO: 11.17 THOUSAND/UL (ref 4.31–10.16)

## 2023-12-22 PROCEDURE — 80061 LIPID PANEL: CPT

## 2023-12-22 PROCEDURE — 99214 OFFICE O/P EST MOD 30 MIN: CPT

## 2023-12-22 PROCEDURE — 83036 HEMOGLOBIN GLYCOSYLATED A1C: CPT

## 2023-12-22 PROCEDURE — 80053 COMPREHEN METABOLIC PANEL: CPT

## 2023-12-22 PROCEDURE — 85025 COMPLETE CBC W/AUTO DIFF WBC: CPT

## 2023-12-22 NOTE — PROGRESS NOTES
Name: Josep Cooper      : 1995      MRN: [id number]  Encounter Provider: MAXIMUS Dorsey  Encounter Date: 2023   Encounter department: [facility]    Assessment & Plan     1. Pre-employment examination  Assessment & Plan:  No known contraindications to use of lethal weapon as listed on form provided by pt.  - Form completed and returned to pt.       2. Primary hypertension  Assessment & Plan:  BP above goal in office today at 140/90. Pt is out of his BP medication.  - Continue lisinopril 10 mg daily. Encouraged improved adherence to medication, reviewed negative effects of uncontrolled high BP.  - Encouraged low-salt diet and daily physical activity.   - Nurse visit in 2 weeks for BP check. If remains elevated, will increase lisinopril to 20 mg daily.    Orders:  -     Albumin / creatinine urine ratio; Future    3. Class 3 severe obesity due to excess calories with serious comorbidity and body mass index (BMI) of 50.0 to 59.9 in adult (HCC)  Assessment & Plan:  Body mass index is 59.57 kg/m². The BMI is above normal.   - Nutrition recommendations include decreasing portion sizes, encouraging healthy choices of fruits and vegetables, limiting drinks that contain sugar and moderation in carbohydrate intake.   - Exercise recommendations include moderate physical activity 150 minutes/week.     Orders:  -     Hemoglobin A1C; Future  -     Comprehensive metabolic panel; Future  -     Lipid panel; Future  -     CBC and differential; Future      BMI Counseling: Body mass index is 59.57 kg/m². The BMI is above normal. Nutrition recommendations include decreasing portion sizes, encouraging healthy choices of fruits and vegetables, limiting drinks that contain sugar and moderation in carbohydrate intake. Exercise recommendations include moderate physical activity 150 minutes/week. Rationale for BMI follow-up plan is due to patient being overweight or obese.          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 Pt denies any diagnoses or symptoms that would make him ineligible as listed on the form. Denies family history of heart disease or sudden cardiac death. Denies symptoms associated with elevated BP, states BP is elevated because medical facilities make him nervous and he is out of his lisinopril.       Review of Systems   Constitutional:  Negative for activity change, appetite change, fatigue and fever.   Eyes:  Negative for visual disturbance.   Respiratory:  Negative for cough, chest tightness, shortness of breath and wheezing.    Cardiovascular:  Negative for chest pain, palpitations and leg swelling.   Gastrointestinal:  Negative for abdominal pain, diarrhea, nausea and vomiting.   Neurological:  Negative for dizziness, syncope, light-headedness and headaches.   Psychiatric/Behavioral:  Negative for dysphoric mood and sleep disturbance. The patient is not nervous/anxious.    All other systems reviewed and are negative.      Past Medical History:   Diagnosis Date    Concussion with loss of consciousness 2017    Last Assessment & Plan: Formatting of this note might be different from the original. Requesting records from [facility]     History reviewed. No pertinent surgical history.  Family History   Problem Relation Age of Onset    Diabetes Paternal Grandfather      Social History     Socioeconomic History    Marital status: Single     Spouse name: None    Number of children: 0    Years of education: None    Highest education level: None   Occupational History    None   Tobacco Use    Smoking status: Former     Current packs/day: 0.00     Average packs/day: 0.3 packs/day for 11.7 years (2.9 ttl pk-yrs)     Types: Cigarettes     Start date:      Quit date: 2023     Years since quittin.3    Smokeless tobacco: Never   Vaping Use    Vaping status: Some  Days   Substance and Sexual Activity    Alcohol use: Never    Drug use: Never    Sexual activity: None   Other Topics Concern    None   Social History Narrative    None     Social Determinants of Health     Financial Resource Strain: Low Risk  (10/26/2023)    Overall Financial Resource Strain (CARDIA)     Difficulty of Paying Living Expenses: Not hard at all   Food Insecurity: No Food Insecurity (10/26/2023)    Hunger Vital Sign     Worried About Running Out of Food in the Last Year: Never true     Ran Out of Food in the Last Year: Never true   Transportation Needs: No Transportation Needs (10/26/2023)    PRAPARE - Transportation     Lack of Transportation (Medical): No     Lack of Transportation (Non-Medical): No   Physical Activity: Not on file   Stress: Not on file   Social Connections: Not on file   Intimate Partner Violence: Not on file   Housing Stability: Not on file     Current Outpatient Medications on File Prior to Visit   Medication Sig    lisinopril (ZESTRIL) 10 mg tablet Take 1 tablet (10 mg total) by mouth daily     Allergies   Allergen Reactions    Tuberculin Ppd Other (See Comments)     Other reaction(s): Positive skin tests in past     Immunization History   Administered Date(s) Administered    COVID-19 Pfizer vac (William-sucrose, gray cap) 12 yr+ IM 02/02/2022, 02/23/2022    DTaP 1995, 1995, 1995, 06/13/1996, 05/11/1999    HPV Quadrivalent 02/24/2011, 05/23/2012    Hep A, ped/adol, 2 dose 02/24/2011, 05/23/2012    Hep B, Adolescent or Pediatric 1995, 1995, 1995    HiB 1995, 1995, 06/13/1996    INFLUENZA 02/24/2011, 05/23/2012    IPV 1995, 1995, 1995, 1995, 06/13/1996, 05/11/1999    Influenza, injectable, quadrivalent, preservative free 0.5 mL 10/26/2023    MMR 1995, 04/12/1996, 05/11/1999    Meningococcal MCV4P 02/24/2011, 05/23/2012    Tdap 02/24/2011, 11/29/2017    Tuberculin Skin Test-PPD Intradermal 04/12/1996,  "05/11/1996    Varicella 10/23/1997, 05/23/2012       Objective     /90 (BP Location: Left arm, Patient Position: Sitting, Cuff Size: Extra-Large)   Pulse 100   Temp 98 °F (36.7 °C) (Temporal)   Resp 16   Ht 6' 2\" (1.88 m)   Wt (!) 210 kg (464 lb)   SpO2 98%   BMI 59.57 kg/m²     Physical Exam  Vitals reviewed.   Constitutional:       General: He is not in acute distress.     Appearance: He is morbidly obese. He is not ill-appearing or diaphoretic.   HENT:      Head: Normocephalic and atraumatic.      Right Ear: Tympanic membrane, ear canal and external ear normal.      Left Ear: Tympanic membrane, ear canal and external ear normal.      Nose: Nose normal.      Mouth/Throat:      Mouth: Mucous membranes are moist.      Pharynx: Oropharynx is clear.   Eyes:      General: Lids are normal.      Conjunctiva/sclera: Conjunctivae normal.      Pupils: Pupils are equal, round, and reactive to light.   Cardiovascular:      Rate and Rhythm: Normal rate and regular rhythm.      Pulses: Normal pulses.      Heart sounds: Normal heart sounds. No murmur heard.  Pulmonary:      Effort: Pulmonary effort is normal. No tachypnea.      Breath sounds: Normal breath sounds. No decreased breath sounds, wheezing or rales.   Musculoskeletal:         General: Normal range of motion.      Cervical back: Neck supple.      Right lower leg: No edema.      Left lower leg: No edema.   Lymphadenopathy:      Cervical: No cervical adenopathy.   Skin:     General: Skin is warm and dry.      Capillary Refill: Capillary refill takes less than 2 seconds.   Neurological:      Mental Status: He is alert and oriented to person, place, and time.      Cranial Nerves: No cranial nerve deficit, dysarthria or facial asymmetry.      Motor: Motor function is intact. No weakness.      Gait: Gait is intact.   Psychiatric:         Attention and Perception: Attention normal.         Mood and Affect: Mood and affect normal.         Speech: Speech normal.    "      Behavior: Behavior normal.         Thought Content: Thought content normal.       MAXIMUS Dorsey

## 2023-12-24 DIAGNOSIS — R79.89 ELEVATED LFTS: Primary | ICD-10-CM

## 2023-12-24 DIAGNOSIS — D72.829 LEUKOCYTOSIS, UNSPECIFIED TYPE: ICD-10-CM

## 2023-12-26 NOTE — ASSESSMENT & PLAN NOTE
No known contraindications to use of lethal weapon as listed on form provided by pt.  - Form completed and returned to pt.

## 2023-12-26 NOTE — ASSESSMENT & PLAN NOTE
BP above goal in office today at 140/90. Pt is out of his BP medication.  - Continue lisinopril 10 mg daily. Encouraged improved adherence to medication, reviewed negative effects of uncontrolled high BP.  - Encouraged low-salt diet and daily physical activity.   - Nurse visit in 2 weeks for BP check. If remains elevated, will increase lisinopril to 20 mg daily.

## 2023-12-26 NOTE — ASSESSMENT & PLAN NOTE
Body mass index is 59.57 kg/m². The BMI is above normal.   - Nutrition recommendations include decreasing portion sizes, encouraging healthy choices of fruits and vegetables, limiting drinks that contain sugar and moderation in carbohydrate intake.   - Exercise recommendations include moderate physical activity 150 minutes/week.

## 2023-12-29 ENCOUNTER — TELEPHONE (OUTPATIENT)
Dept: FAMILY MEDICINE CLINIC | Facility: CLINIC | Age: 28
End: 2023-12-29

## 2024-04-02 NOTE — PROGRESS NOTES
Name: Josep Cooper      : 1995      MRN: [id number]  Encounter Provider: Michelle Gardiner MD  Encounter Date: 4/3/2024   [facility]    Assessment & Plan     1. Primary hypertension  Assessment & Plan:  BP slightly above goal in office today at 146/86. Goal of <140/90  Pt not using lisinopril due to report of increasing his BP.  BP likely secondary to sleep apnea that is yet to be confirmed with sleep study.     - Encouraged to continue Lisinopril daily  - Lifestyle modifications discussed such as DASH diet  - Instructed to go to lab for CMP. order in place      2. Sleep apnea, unspecified type  Assessment & Plan:  STOP BANG score of 7.  High probability that patient has obstructive sleep apnea but needs to be confirmed with sleep study.    - Ambulatory referral to sleep medicine    Orders:  -     Ambulatory Referral to Sleep Medicine; Future    3. Insomnia, unspecified type  Assessment & Plan:  Longtime history of insomnia likely secondary to STACI.    -Provided instructions on proper sleep hygiene.  -Avoid excessive energy drink use and caffeinated drinks close to bedtime      4. Class 3 severe obesity due to excess calories without serious comorbidity with body mass index (BMI) of 60.0 to 69.9 in adult (HCC)  Assessment & Plan:   Body mass index is 61.24 kg/m².     - Referral to weight management to discuss bariatric surgery per patient request.  - Exercise recommendations include moderate physical activity 150 minutes/week.   - Nutrition recommendations include decreasing portion sizes, encouraging healthy choices of fruits and vegetables, limiting drinks that contain sugar and moderation in carbohydrate intake.     Orders:  -     Ambulatory Referral to Weight Management; Future         Subjective     Josep Cooper is a 29 y.o. male with a PMH of HTN presenting today for follow-up visit.  Patient's main concern today is his difficulty  sleeping.  Patient states symptoms have been present for years and he has difficulty initiating and maintaining sleep.  He is usually able to fall asleep in the morning time between 6 AM to 11 AM.  He has tried melatonin with no help.  He states he does drink lots of energy drink.  Admits to snoring loudly and witnessed apneic events at night.  Patient further states he has not been taking his lisinopril because it makes his blood pressure go higher than normal and he feels he does not need to take it.  Other symptoms endorsed or denied per ROS.        Review of Systems   Constitutional:  Negative for activity change, appetite change, fatigue and fever.   HENT: Negative.     Respiratory:  Negative for cough, shortness of breath and wheezing.    Cardiovascular:  Negative for chest pain, palpitations and leg swelling.   Gastrointestinal:  Negative for abdominal pain, diarrhea, nausea and vomiting.   Genitourinary: Negative.    Musculoskeletal: Negative.    Neurological:  Negative for dizziness, light-headedness and headaches.   Psychiatric/Behavioral:  Positive for sleep disturbance. Negative for dysphoric mood. The patient is not nervous/anxious.    All other systems reviewed and are negative.      Past Medical History:   Diagnosis Date   • Concussion with loss of consciousness 04/18/2017    Last Assessment & Plan: Formatting of this note might be different from the original. Requesting records from [facility]     History reviewed. No pertinent surgical history.  Family History   Problem Relation Age of Onset   • Diabetes Paternal Grandfather      Social History     Socioeconomic History   • Marital status: Single     Spouse name: None   • Number of children: 0   • Years of education: None   • Highest education level: None   Occupational History   • None   Tobacco Use   • Smoking status: Former     Current packs/day: 0.00     Average packs/day: 0.3 packs/day for 11.7 years (2.9 ttl pk-yrs)     Types:  Cigarettes     Start date:      Quit date: 2023     Years since quittin.5   • Smokeless tobacco: Never   Vaping Use   • Vaping status: Former   Substance and Sexual Activity   • Alcohol use: Never   • Drug use: Never   • Sexual activity: None   Other Topics Concern   • None   Social History Narrative   • None     Social Determinants of Health     Financial Resource Strain: Low Risk  (10/26/2023)    Overall Financial Resource Strain (CARDIA)    • Difficulty of Paying Living Expenses: Not hard at all   Food Insecurity: No Food Insecurity (10/26/2023)    Hunger Vital Sign    • Worried About Running Out of Food in the Last Year: Never true    • Ran Out of Food in the Last Year: Never true   Transportation Needs: No Transportation Needs (10/26/2023)    PRAPARE - Transportation    • Lack of Transportation (Medical): No    • Lack of Transportation (Non-Medical): No   Physical Activity: Not on file   Stress: Not on file   Social Connections: Not on file   Intimate Partner Violence: Not on file   Housing Stability: Low Risk  (4/3/2024)    Housing Stability Vital Sign    • Unable to Pay for Housing in the Last Year: No    • Number of Places Lived in the Last Year: 1    • Unstable Housing in the Last Year: No     Current Outpatient Medications on File Prior to Visit   Medication Sig   • lisinopril (ZESTRIL) 10 mg tablet Take 1 tablet (10 mg total) by mouth daily     Allergies   Allergen Reactions   • Tuberculin Ppd Other (See Comments)     Other reaction(s): Positive skin tests in past     Immunization History   Administered Date(s) Administered   • COVID-19 Pfizer vac (William-sucrose, gray cap) 12 yr+ IM 2022, 2022   • DTaP 1995, 1995, 1995, 1996, 1999   • HPV Quadrivalent 2011, 2012   • Hep A, ped/adol, 2 dose 2011, 2012   • Hep B, Adolescent or Pediatric 1995, 1995, 1995   • HiB 1995, 1995, 1996   • INFLUENZA  "02/24/2011, 05/23/2012   • IPV 1995, 1995, 1995, 1995, 06/13/1996, 05/11/1999   • Influenza, injectable, quadrivalent, preservative free 0.5 mL 10/26/2023   • MMR 1995, 04/12/1996, 05/11/1999   • Meningococcal MCV4P 02/24/2011, 05/23/2012   • Tdap 02/24/2011, 11/29/2017   • Tuberculin Skin Test-PPD Intradermal 04/12/1996, 05/11/1996   • Varicella 10/23/1997, 05/23/2012       Objective     /86 (BP Location: Left arm, Patient Position: Sitting, Cuff Size: Large)   Pulse 102   Temp 98.3 °F (36.8 °C) (Temporal)   Resp 18   Ht 6' 2\" (1.88 m)   Wt (!) 216 kg (477 lb)   SpO2 98%   BMI 61.24 kg/m²     Physical Exam  Vitals reviewed.   Constitutional:       General: He is not in acute distress.     Appearance: He is morbidly obese. He is not ill-appearing or diaphoretic.   HENT:      Head: Normocephalic and atraumatic.      Nose: Nose normal.   Eyes:      General: Lids are normal.      Extraocular Movements: Extraocular movements intact.      Conjunctiva/sclera: Conjunctivae normal.   Cardiovascular:      Rate and Rhythm: Normal rate and regular rhythm.      Heart sounds: Normal heart sounds. No murmur heard.  Pulmonary:      Effort: Pulmonary effort is normal. No tachypnea.      Breath sounds: Normal breath sounds. No decreased breath sounds, wheezing or rales.   Abdominal:      Tenderness: There is no abdominal tenderness.   Musculoskeletal:         General: Normal range of motion.      Cervical back: Normal range of motion.      Right lower leg: No edema.      Left lower leg: No edema.   Skin:     General: Skin is warm and dry.      Capillary Refill: Capillary refill takes less than 2 seconds.   Neurological:      Mental Status: He is alert and oriented to person, place, and time.      Cranial Nerves: No cranial nerve deficit, dysarthria or facial asymmetry.      Motor: Motor function is intact. No weakness.      Gait: Gait is intact.   Psychiatric:         Attention and " Perception: Attention normal.         Mood and Affect: Mood and affect normal.         Speech: Speech normal.         Behavior: Behavior normal.         Thought Content: Thought content normal.         Michelle Gardiner MD

## 2024-04-03 ENCOUNTER — OFFICE VISIT (OUTPATIENT)
Dept: FAMILY MEDICINE CLINIC | Facility: CLINIC | Age: 29
End: 2024-04-03

## 2024-04-03 VITALS
WEIGHT: 315 LBS | OXYGEN SATURATION: 98 % | HEIGHT: 74 IN | BODY MASS INDEX: 40.43 KG/M2 | SYSTOLIC BLOOD PRESSURE: 144 MMHG | HEART RATE: 102 BPM | TEMPERATURE: 98.3 F | RESPIRATION RATE: 18 BRPM | DIASTOLIC BLOOD PRESSURE: 86 MMHG

## 2024-04-03 DIAGNOSIS — I10 PRIMARY HYPERTENSION: Primary | ICD-10-CM

## 2024-04-03 DIAGNOSIS — G47.30 SLEEP APNEA, UNSPECIFIED TYPE: ICD-10-CM

## 2024-04-03 DIAGNOSIS — E66.01 CLASS 3 SEVERE OBESITY DUE TO EXCESS CALORIES WITHOUT SERIOUS COMORBIDITY WITH BODY MASS INDEX (BMI) OF 60.0 TO 69.9 IN ADULT (HCC): ICD-10-CM

## 2024-04-03 DIAGNOSIS — G47.00 INSOMNIA, UNSPECIFIED TYPE: ICD-10-CM

## 2024-04-03 PROBLEM — Z72.89 CURRENT VAPING ON SOME DAYS: Status: RESOLVED | Noted: 2023-10-26 | Resolved: 2024-04-03

## 2024-04-03 PROBLEM — Z02.1 PRE-EMPLOYMENT EXAMINATION: Status: RESOLVED | Noted: 2023-10-26 | Resolved: 2024-04-03

## 2024-04-03 PROCEDURE — 99213 OFFICE O/P EST LOW 20 MIN: CPT | Performed by: FAMILY MEDICINE

## 2024-04-04 ENCOUNTER — TELEPHONE (OUTPATIENT)
Age: 29
End: 2024-04-04

## 2024-04-04 DIAGNOSIS — G47.30 SLEEP APNEA, UNSPECIFIED TYPE: Primary | ICD-10-CM

## 2024-04-04 NOTE — ASSESSMENT & PLAN NOTE
Body mass index is 61.24 kg/m².     - Referral to weight management to discuss bariatric surgery per patient request.  - Exercise recommendations include moderate physical activity 150 minutes/week.   - Nutrition recommendations include decreasing portion sizes, encouraging healthy choices of fruits and vegetables, limiting drinks that contain sugar and moderation in carbohydrate intake.

## 2024-04-04 NOTE — ASSESSMENT & PLAN NOTE
BP slightly above goal in office today at 146/86. Goal of <140/90  Pt not using lisinopril due to report of increasing his BP.  BP likely secondary to sleep apnea that is yet to be confirmed with sleep study.     - Encouraged to continue Lisinopril daily  - Lifestyle modifications discussed such as DASH diet  - Instructed to go to lab for CMP. order in place

## 2024-04-04 NOTE — ASSESSMENT & PLAN NOTE
Longtime history of insomnia likely secondary to STACI.    -Provided instructions on proper sleep hygiene.  -Avoid excessive energy drink use and caffeinated drinks close to bedtime

## 2024-04-04 NOTE — ASSESSMENT & PLAN NOTE
STOP BANG score of 7.  High probability that patient has obstructive sleep apnea but needs to be confirmed with sleep study.    - Ambulatory referral to sleep medicine

## 2024-04-04 NOTE — TELEPHONE ENCOUNTER
Patient calling to set up a surgical consult, no staff available for warm transfer, please return call when able. Thanks!

## 2024-04-09 ENCOUNTER — TELEPHONE (OUTPATIENT)
Dept: NEUROLOGY | Facility: CLINIC | Age: 29
End: 2024-04-09

## 2024-04-09 DIAGNOSIS — G47.30 SLEEP APNEA, UNSPECIFIED TYPE: Primary | ICD-10-CM

## 2024-04-11 DIAGNOSIS — E66.01 CLASS 3 SEVERE OBESITY DUE TO EXCESS CALORIES WITHOUT SERIOUS COMORBIDITY WITH BODY MASS INDEX (BMI) OF 60.0 TO 69.9 IN ADULT (HCC): Primary | ICD-10-CM

## 2024-04-11 DIAGNOSIS — G47.30 SLEEP APNEA, UNSPECIFIED TYPE: ICD-10-CM

## 2024-04-12 ENCOUNTER — OFFICE VISIT (OUTPATIENT)
Dept: BARIATRICS | Facility: CLINIC | Age: 29
End: 2024-04-12
Payer: COMMERCIAL

## 2024-04-12 VITALS
HEIGHT: 74 IN | TEMPERATURE: 97.9 F | BODY MASS INDEX: 40.43 KG/M2 | HEART RATE: 110 BPM | SYSTOLIC BLOOD PRESSURE: 160 MMHG | DIASTOLIC BLOOD PRESSURE: 100 MMHG | WEIGHT: 315 LBS

## 2024-04-12 DIAGNOSIS — G47.30 SLEEP APNEA, UNSPECIFIED TYPE: ICD-10-CM

## 2024-04-12 DIAGNOSIS — Z87.891 HISTORY OF TOBACCO USE: ICD-10-CM

## 2024-04-12 DIAGNOSIS — I10 PRIMARY HYPERTENSION: Primary | ICD-10-CM

## 2024-04-12 DIAGNOSIS — E66.01 CLASS 3 SEVERE OBESITY DUE TO EXCESS CALORIES WITHOUT SERIOUS COMORBIDITY WITH BODY MASS INDEX (BMI) OF 60.0 TO 69.9 IN ADULT (HCC): ICD-10-CM

## 2024-04-12 PROCEDURE — 99204 OFFICE O/P NEW MOD 45 MIN: CPT | Performed by: SURGERY

## 2024-04-12 NOTE — PROGRESS NOTES
"    BARIATRIC INITIAL CONSULT - BARIATRIC SURGERY    Josep Cooper 29 y.o. male MRN: [id number]  Unit/Bed#:  Encounter: [id number]      HPI:  Josep Cooper is a 29 y.o. male who presents with a longstanding history of morbid obesity and inability to sustain a meaningful weight loss.    Here today to discuss bariatric options.    Visit type: initial visit    Symptoms: excess weight and inability to loss weight    Associated Symptoms: depressed mood and anxiety    Associated Conditions: sleep apnea and abdominal obesity  Disease Complications: hypertension and sleep apnea  Weight Loss Interest: high  Previous Diet Trials: low calorie     Exercise Frequency:infrequency  Types of Exercise: walking    Review of Systems    Historical Information   Past Medical History:   Diagnosis Date    Concussion with loss of consciousness 2017    Last Assessment & Plan: Formatting of this note might be different from the original. Requesting records from [facility]    Hypertension      History reviewed. No pertinent surgical history.  Social History   Social History     Substance and Sexual Activity   Alcohol Use Never     Social History     Substance and Sexual Activity   Drug Use Never     Social History     Tobacco Use   Smoking Status Former    Current packs/day: 0.00    Average packs/day: 0.3 packs/day for 11.7 years (2.9 ttl pk-yrs)    Types: Cigarettes    Start date:     Quit date: 2023    Years since quittin.6   Smokeless Tobacco Never     Family History: non-contributory    Meds/Allergies   all medications and allergies reviewed  Allergies   Allergen Reactions    Tuberculin Ppd Other (See Comments)     Other reaction(s): Positive skin tests in past       Objective     Current Vitals:   Blood Pressure: 160/100 (24 1430)  Pulse: (!) 110 (24 1430)  Temperature: 97.9 °F (36.6 °C) (24 1430)  Temp Source: Tympanic (24 1430)  Height: 6' 1.9\" (187.7 cm) (24 1430)  Weight - " Scale: (!) 213 kg (469 lb) (04/12/24 1430)      Invasive Devices       None                   Physical Exam    Lab Results: I have personally reviewed pertinent lab results.    Imaging: I have personally reviewed pertinent reports.    EKG, Pathology, and Other Studies: I have personally reviewed pertinent reports.        Assessment/PLAN:    29 y.o. male with a long standing h/o of obesity and inability to sustain any meaningful weight loss on his own despite several attempts.    He is interested in the Laparoscopic sleeve gastrectomy.  This is the surgery that he has been reading the most about.  I have discussed all 3 options with him and I have explained to him that he has a better chance of losing more weight with the gastric bypass or even the CLARK  Will continue to read and will let me know which when he decides to have done    As a part of his pre op evaluation, he will be referred to a cardiologist for risk stratification and for a sleep evaluation and consult to rule out obstructive sleep apnea if deemed necessary based on his score.    He needs an EGD to evaluate the anatomy of his GI tract.    I have spent over 30 minutes with him face to face in the office today discussing his options and details of the surgery. We have seen an animation of the surgery on the computer that illustrates how the operation is done and how the anatomy will be altered with the procedure. Over 50% of this was coordinating care.    I have used the MBSAQIP bariatric surgical risk/benefit calculator and we have discussed the results as part of the preop education.  I have discussed and educated the patient with regards to the components of our multidisciplinary program and the importance of compliance and follow up in the post operative period.    He was given the opportunity to ask questions and I have answered all of them.    The patient was also instructed with regards to the importance of behavior modification, nutritional  counseling, support meeting attendance and lifestyle changes that are important to ensure success.    Although there is a great statistical chance of improvement or even resolution of most of his associated comorbidities, the results vary from patient to patient and they largely depend on his commitment and compliance.     I have reviewed instructions for stopping or tapering anti-obesity medications prior to surgery.      I have told him to lose 46 lbs prior to the operation.  His BMI is 60 and we need to help him lose weight to make sure that his surgery is safer.      Raul Knutson MD  4/12/2024  2:48 PM

## 2024-04-18 ENCOUNTER — OFFICE VISIT (OUTPATIENT)
Age: 29
End: 2024-04-18
Payer: COMMERCIAL

## 2024-04-18 VITALS
HEART RATE: 102 BPM | SYSTOLIC BLOOD PRESSURE: 180 MMHG | HEIGHT: 73 IN | OXYGEN SATURATION: 97 % | BODY MASS INDEX: 41.75 KG/M2 | DIASTOLIC BLOOD PRESSURE: 80 MMHG | WEIGHT: 315 LBS

## 2024-04-18 DIAGNOSIS — G47.19 EXCESSIVE DAYTIME SLEEPINESS: Primary | ICD-10-CM

## 2024-04-18 DIAGNOSIS — R06.83 SNORING: ICD-10-CM

## 2024-04-18 DIAGNOSIS — E66.01 CLASS 3 SEVERE OBESITY DUE TO EXCESS CALORIES WITHOUT SERIOUS COMORBIDITY WITH BODY MASS INDEX (BMI) OF 60.0 TO 69.9 IN ADULT (HCC): ICD-10-CM

## 2024-04-18 DIAGNOSIS — I10 HYPERTENSION, UNSPECIFIED TYPE: ICD-10-CM

## 2024-04-18 DIAGNOSIS — G47.30 SLEEP APNEA, UNSPECIFIED TYPE: ICD-10-CM

## 2024-04-18 PROCEDURE — 99204 OFFICE O/P NEW MOD 45 MIN: CPT | Performed by: INTERNAL MEDICINE

## 2024-04-18 NOTE — PROGRESS NOTES
Sleep Consultation   Josep Cooper 29 y.o. male MRN: [id number]      Reason for consultation: Suspected sleep apnea    Requesting physician: Michelle Gardiner MD PCP    Assessment/Plan    Suspected sleep apnea  Body mass index is 62.62 kg/m²., Neck Circumference: 20.    He/she is at risk for obstructive sleep apnea based on STOP [employer]ANG survey based on snoring, tiredness, observed apneas, male gender, elevated BMI, increased neck circumference, elevated blood pressure  S/s: Snoring, choking, gagging, witnessed apneas, excessive tiredness and daytime sleepiness  Haddock score: 10  I discussed in depth the diagnostic studies and treatment options involved with obstructive sleep apnea  I also discussed in depth the risk of leaving sleep apnea untreated including hypertension, heart failure, arrhythmia, MI and stroke.  The patient is agreeable to undergo testing and treatment of obstructive sleep apnea.  He/she understands the pitfalls he/she may encounter along the way and is willing to attempt CPAP treatment.     Plan  Diagnostic PSG ordered  Patient is amenable to CPAP    2.  Chronic insomnia  He slept only 30 minutes last night  He has sleep onset and maintenance insomnia  Counseled patient on sleep hygiene measures    Plan  Will monitor after testing for sleep apnea    3.  Snoring  As above    4.  Excessive daytime sleepiness  As above    5.  Obesity  Counseled patient on lifestyle modifications including diet and exercise  I will discuss referral to weight management at next visit    6.  Hypertension  Blood pressure was highly elevated today  He states the blood pressure is generally high whenever he drinks a red bull but is normal at home  Currently on lisinopril 10 mg daily  Recommended he discuss with his primary care physician about elevated blood pressure reading  Patient denies being symptomatic    History of Present Illness   HPI:  Josep Cooper is a 29 y.o. male with PMHx Past medical history of morbid obesity,  hypertension, current tobacco use who presents for evaluation of suspected sleep apnea.  He reports loud snoring, choking, gagging, and witnessed apneas.  He has difficulty falling and staying asleep.  He has frequent nighttime awakenings.  His sleep schedule is highly variable.  He states that he only slept 30 minutes last night.  He works second shift from 2 PM to 10 PM.  He has an [place] score of 10.  He drinks energy drinks approximately 24 ounces.  He quit smoking 2 months ago.  He occasionally has alcohol.    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          Review of Systems      Genitourinary none   Cardiology none   Gastrointestinal none   Neurology none   Constitutional none   Integumentary none   Psychiatry none   Musculoskeletal none   Pulmonary none   ENT none   Endocrine none   Hematological none         Historical Information   Past Medical History:   Diagnosis Date    Concussion with loss of consciousness 2017    Last Assessment & Plan: Formatting of this note might be different from the original. Requesting records from [facility]    Hypertension      History reviewed. No pertinent surgical history.  Family History   Problem Relation Age of Onset    Diabetes Paternal Grandfather      Social History     Socioeconomic History    Marital status: Single     Spouse name: Not on file    Number of children: 0    Years of education: Not on file    Highest education level: Not on file   Occupational History    Not on file   Tobacco Use    Smoking status: Former     Current packs/day: 0.00     Average packs/day: 0.3 packs/day for 11.7 years (2.9 ttl pk-yrs)     Types: Cigarettes     Start date:      Quit date: 2023     Years since quittin.6    Smokeless tobacco: Never   Vaping Use    Vaping status: Former   Substance and Sexual Activity    Alcohol use: Never    Drug use: Never    Sexual activity: Not on file   Other Topics Concern    Not on file   Social History Narrative    Not  "on file     Social Determinants of Health     Financial Resource Strain: Low Risk  (10/26/2023)    Overall Financial Resource Strain (CARDIA)     Difficulty of Paying Living Expenses: Not hard at all   Food Insecurity: No Food Insecurity (10/26/2023)    Hunger Vital Sign     Worried About Running Out of Food in the Last Year: Never true     Ran Out of Food in the Last Year: Never true   Transportation Needs: No Transportation Needs (10/26/2023)    PRAPARE - Transportation     Lack of Transportation (Medical): No     Lack of Transportation (Non-Medical): No   Physical Activity: Not on file   Stress: Not on file   Social Connections: Not on file   Intimate Partner Violence: Not on file   Housing Stability: Low Risk  (4/3/2024)    Housing Stability Vital Sign     Unable to Pay for Housing in the Last Year: No     Number of Places Lived in the Last Year: 1     Unstable Housing in the Last Year: No       Occupational History: Drives a [employer]    Meds/Allergies   Allergies   Allergen Reactions    Tuberculin Ppd Other (See Comments)     Other reaction(s): Positive skin tests in past       Home medications:  Prior to Admission medications    Medication Sig Start Date End Date Taking? Authorizing Provider   lisinopril (ZESTRIL) 10 mg tablet Take 1 tablet (10 mg total) by mouth daily 10/26/23  Yes MAXIMUS Dorsey       Vitals:   Blood pressure (!) 180/80, pulse 102, height 6' 1\" (1.854 m), weight (!) 215 kg (474 lb 9.6 oz), SpO2 97%.,  Body mass index is 62.62 kg/m².  Neck Circumference: 20    Physical Exam  General: Awake alert and oriented x 3, conversant without conversational dyspnea, NAD, normal affect  HEENT:  PERRL, Sclera noninjected, nonicteric OU, Nares patent,  no craniofacial abnormalities, Mucous membranes, moist, no oral lesions, normal dentition  NECK:  Trachea midline, no accessory muscle use, no stridor, no cervical or supraclavicular adenopathy, JVP not elevated  CARDIAC: Reg, single s1/S2, no " "m/r/g  PULM: CTA bilaterally no wheezing, rhonchi or rales  EXT: No cyanosis, no clubbing, no edema, normal capillary refill  NEURO: no focal neurologic deficits, AAOx3, moving all extremities appropriately    Labs: I have personally reviewed pertinent lab results.  Lab Results   Component Value Date    WBC 11.17 (H) 12/22/2023    HGB 15.0 12/22/2023    HCT 45.8 12/22/2023    MCV 90 12/22/2023     12/22/2023      Lab Results   Component Value Date    CALCIUM 9.6 12/22/2023    K 3.6 12/22/2023    CO2 25 12/22/2023     12/22/2023    BUN 11 12/22/2023    CREATININE 0.77 12/22/2023     No results found for: \"IRON\", \"TIBC\", \"FERRITIN\"  No results found for: \"BLTQFMXZ20\"  No results found for: \"FOLATE\"      Arterial Blood Gas result:  TERRELL Steinberg MD  [facility] Sleep Medicine   "

## 2024-04-23 ENCOUNTER — TELEPHONE (OUTPATIENT)
Dept: BARIATRICS | Facility: CLINIC | Age: 29
End: 2024-04-23

## 2024-04-25 ENCOUNTER — TELEPHONE (OUTPATIENT)
Dept: BARIATRICS | Facility: CLINIC | Age: 29
End: 2024-04-25

## 2024-04-25 NOTE — TELEPHONE ENCOUNTER
Called and patient states does not have hard copy of his insurance card and was unable to figure out how to get a copy of his insurance card electronically to send through [employer].  Informed patient that his appointment with  on 4/29 has been cancelled until he can provide hard copy of insurance card.  Patient stated OK and disconnected call.

## 2024-05-14 ENCOUNTER — CLINICAL SUPPORT (OUTPATIENT)
Dept: BARIATRICS | Facility: CLINIC | Age: 29
End: 2024-05-14

## 2024-05-14 DIAGNOSIS — E66.01 MORBID (SEVERE) OBESITY DUE TO EXCESS CALORIES (HCC): Primary | ICD-10-CM

## 2024-05-14 DIAGNOSIS — E66.01 CLASS 3 SEVERE OBESITY DUE TO EXCESS CALORIES WITHOUT SERIOUS COMORBIDITY WITH BODY MASS INDEX (BMI) OF 60.0 TO 69.9 IN ADULT (HCC): Primary | ICD-10-CM

## 2024-05-14 PROCEDURE — RECHECK

## 2024-05-14 NOTE — PROGRESS NOTES
Spoke to patient on the phone:    Patient is interested in the bariatric surgical process. Patient qualifies for surgery with current comorbidities and BMI. Discussed the entire surgical workup process and all requirements of [facility] program and patient's insurance. Answered all questions at the time of the phone call. All SW/RD questions redirected to the next appointment, the 2-hour evaluation.      Patient verbalized understanding of the surgical process at [facility] and had no further questions at this time.

## 2024-06-04 ENCOUNTER — HOSPITAL ENCOUNTER (OUTPATIENT)
Dept: SLEEP CENTER | Facility: CLINIC | Age: 29
Discharge: HOME | End: 2024-06-04
Payer: COMMERCIAL

## 2024-06-04 DIAGNOSIS — G47.30 SLEEP APNEA, UNSPECIFIED TYPE: ICD-10-CM

## 2024-06-04 PROCEDURE — 95810 POLYSOM 6/> YRS 4/> PARAM: CPT | Performed by: INTERNAL MEDICINE

## 2024-06-04 PROCEDURE — 95810 POLYSOM 6/> YRS 4/> PARAM: CPT

## 2024-06-05 NOTE — PROGRESS NOTES
Sleep Study Documentation    Pre-Sleep Study       Sleep testing procedure explained to patient:YES    Patient napped prior to study:YES- more than 30 minutes. 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  Caffeine use:NO    Alcohol:Dayshift workers after 5PM: Alcohol use:NO    Typical day for patient:YES       Study Documentation    Sleep Study Indications: Snore, EDS, Unrefreshed sleep, Witnessed choking/Gasping    Sleep Study: Diagnostic   Snore:Moderate  Supplemental O2: no    O2 flow rate (L/min) range   O2 flow rate (L/min) final   Minimum SaO2 81  Baseline SaO2 95    Mode of Therapy:    EKG abnormalities: no     EEG abnormalities: no    Were abnormal behaviors in sleep observed:NO    Is Total Sleep Study Recording Time < 2 hours: N/A    Is Total Sleep Study Recording Time > 2 hours but study is incomplete: N/A    Is Total Sleep Study Recording Time 6 hours or more but sleep was not obtained: NO    Patient classification: employed       Post-Sleep Study    Medication used at bedtime or during sleep study:NO    Patient reports time it took to fall asleep:greater than 60 minutes    Patient reports waking up during study:3 or more times.  Patient reports returning to sleep without difficulty.    Patient reports sleeping 4 to 6 hours without dreaming.    Does the Patient feel this is a typical night of sleep:better than usual    Patient rated sleepiness: Somewhat sleepy or tired    PAP treatment:no.

## 2024-06-10 DIAGNOSIS — G47.33 OSA (OBSTRUCTIVE SLEEP APNEA): Primary | ICD-10-CM

## 2024-06-17 ENCOUNTER — CLINICAL SUPPORT (OUTPATIENT)
Dept: BARIATRICS | Facility: CLINIC | Age: 29
End: 2024-06-17

## 2024-06-17 VITALS — WEIGHT: 315 LBS | BODY MASS INDEX: 41.75 KG/M2 | HEIGHT: 73 IN

## 2024-06-17 DIAGNOSIS — Z01.818 PRE-OPERATIVE CLEARANCE: ICD-10-CM

## 2024-06-17 DIAGNOSIS — F17.211 CIGARETTE NICOTINE DEPENDENCE IN REMISSION: ICD-10-CM

## 2024-06-17 DIAGNOSIS — E66.01 CLASS 3 SEVERE OBESITY DUE TO EXCESS CALORIES WITHOUT SERIOUS COMORBIDITY WITH BODY MASS INDEX (BMI) OF 60.0 TO 69.9 IN ADULT (HCC): Primary | ICD-10-CM

## 2024-06-17 DIAGNOSIS — G47.30 SLEEP APNEA, UNSPECIFIED TYPE: ICD-10-CM

## 2024-06-17 DIAGNOSIS — E66.01 MORBID OBESITY WITH BMI OF 60.0-69.9, ADULT (HCC): Primary | ICD-10-CM

## 2024-06-17 DIAGNOSIS — I10 PRIMARY HYPERTENSION: ICD-10-CM

## 2024-06-17 DIAGNOSIS — Z87.891 HISTORY OF TOBACCO USE: ICD-10-CM

## 2024-06-17 PROCEDURE — RECHECK: Performed by: DIETITIAN, REGISTERED

## 2024-06-17 PROCEDURE — RECHECK

## 2024-06-17 NOTE — PROGRESS NOTES
"Bariatric Nutrition Assessment Note    Type of surgery    Preop- interested in the sleeve gastrectomy   Surgery Date: TBD- 6 month program requirement   Surgeon: Dr. Raul Knutson    Nutrition Assessment   Josep Cooper  29 y.o.  male     Wt with BMI of 25: 194.8  Pre-Op Excess Wt: 274.2 lbs   Ht 6' 1\" (1.854 m)   Wt (!) 215 kg (474 lb 9.6 oz)   BMI 62.62 kg/m²    Wt Readings from Last 3 Encounters:   04/18/24 (!) 215 kg (474 lb 9.6 oz)   04/12/24 (!) 213 kg (469 lb)   04/03/24 (!) 216 kg (477 lb)        Colette Alexander Equation:    QFU=[id number]  Weight Maintenance 3593  Estimated calories for weight loss 4593-1235 ( 1-2# per wk wt loss - sedentary )  Estimated protein needs 88.5-133 grams (1.0-1.5 gms/kg IBW )   Estimated fluid needs 88.5-103 oz per day (30-35 ml/kg IBW )      Weight History   Onset of Obesity: Childhood  Family history of obesity: No  Wt Loss Attempts: Exercise  Self Created Diets (Portion Control, Healthy Food Choices, etc.)  Patient has tried the above for 6 months or more with insufficient weight loss or weight regain, which is why patient has requested to be evaluated for weight loss surgery today  Maximum Wt Lost: 60 lbs with diet and exercise       Review of History and Medications   Past Medical History:   Diagnosis Date    Concussion with loss of consciousness 04/18/2017    Last Assessment & Plan: Formatting of this note might be different from the original. Requesting records from [facility]    Hypertension      No past surgical history on file.  Social History     Socioeconomic History    Marital status: Single     Spouse name: Not on file    Number of children: 0    Years of education: Not on file    Highest education level: Not on file   Occupational History    Not on file   Tobacco Use    Smoking status: Former     Current packs/day: 0.00     Average packs/day: 0.3 packs/day for 11.7 years (2.9 ttl pk-yrs)     Types: Cigarettes     Start date: 2012     Quit date: 09/2023 " "    Years since quittin.7    Smokeless tobacco: Never   Vaping Use    Vaping status: Former   Substance and Sexual Activity    Alcohol use: Never    Drug use: Never    Sexual activity: Not on file   Other Topics Concern    Not on file   Social History Narrative    Not on file     Social Determinants of Health     Financial Resource Strain: Low Risk  (10/26/2023)    Overall Financial Resource Strain (CARDIA)     Difficulty of Paying Living Expenses: Not hard at all   Food Insecurity: No Food Insecurity (10/26/2023)    Hunger Vital Sign     Worried About Running Out of Food in the Last Year: Never true     Ran Out of Food in the Last Year: Never true   Transportation Needs: No Transportation Needs (10/26/2023)    PRAPARE - Transportation     Lack of Transportation (Medical): No     Lack of Transportation (Non-Medical): No   Physical Activity: Not on file   Stress: Not on file   Social Connections: Not on file   Intimate Partner Violence: Not on file   Housing Stability: Low Risk  (4/3/2024)    Housing Stability Vital Sign     Unable to Pay for Housing in the Last Year: No     Number of Times Moved in the Last Year: 1     Homeless in the Last Year: No       Current Outpatient Medications:     lisinopril (ZESTRIL) 10 mg tablet, Take 1 tablet (10 mg total) by mouth daily, Disp: 30 tablet, Rfl: 2      Food Intake and Lifestyle Assessment   Food Intake Assessment completed via usual diet recall  Breakfast: Varies when he wakes up   Generally around 10 am   Bagel and cream cheese or egg sandwich   Snack: 0   Lunch: 12 noon  to 2 pm   [place] or hot pocket   Snack: 0  Dinner: 8 pm - rice chicken and beans   Snack: 0  Beverage intake: water and juice  Protein supplement: none   Estimated protein intake per day: ~80-90 grams   Estimated fluid intake per day: \"not enough\"   Meals eaten away from home: Once to twice per week   Typical meal pattern: 3 meals per day and 0-1 snacks per day  Eating Behaviors: Consumption of " "high calorie/ high fat foods and Large portion sizes  Food allergies or intolerances:     Allergies   Allergen Reactions    Tuberculin Ppd Other (See Comments)     Other reaction(s): Positive skin tests in past     Cultural or Baptism considerations:      Physical Assessment  Physical Activity  Types of exercise: walking, housework and laundry   Current physical limitations: none     Psychosocial Assessment   Support systems: mom and dad and friends   Socioeconomic factors: lives alone,     Nutrition Diagnosis  Diagnosis: Overweight / Obesity (NC-3.3)  Related to: Physical inactivity and Excessive energy intake  As Evidenced by: BMI >25 and Unintentional weight gain     Nutrition Prescription: Recommend the following diet  2187-0093 calories/ 130-135 grams protein     Interventions and Teaching   Discussed pre-op and post-op nutrition guidelines.       Patient educated and handouts provided.  Surgical changes to stomach / GI  Capacity of post-surgery stomach  Diet progression  Adequate hydration  Sugar and fat restriction to decrease \"dumping syndrome\"  Fat restriction to decrease steatorrhea  Expected weight loss  Weight loss plateaus/ possibility of weight regain  Exercise  Suggestions for pre-op diet  Nutrition considerations after surgery  Protein supplements  Meal planning and preparation  Appropriate carbohydrate, protein, and fat intake, and food/fluid choices to maximize safe weight loss, nutrient intake, and tolerance   Dietary and lifestyle changes  Possible problems with poor eating habits  Intuitive eating  Techniques for self monitoring and keeping daily food journal  Potential for food intolerance after surgery, and ways to deal with them including: lactose intolerance, nausea, reflux, vomiting, diarrhea, food intolerance, appetite changes, gas  Vitamin / Mineral supplementation of Multivitamin with minerals and Vitamin D    Pt with elevated LFTs- explained that PCP ordered liver ultrasound " and per policy he needs an ultrasound due to elevated LFTs  Pt admitted to vaping on the weekend - ordered nicotine test     Education provided to: patient    Barriers to learning: No barriers identified    Readiness to change: contemplation and preparation    Prior research on procedure: discussed with provider    Comprehension: needs reinforcement and verbalizes understanding     Expected Compliance: good  Recommendations  Pt is an appropriate candidate for surgery. Yes  Pt should make the following changes and then be reassessed for weight loss surgery:   Pt needs to have a negative nicotine test prior to proceeding to surgery.  Pt states he vaped last weekend     Evaluation / Monitoring  Dietitian to Monitor: Eating pattern as discussed Tolerance of nutrition prescription Body weight Lab values Physical activity Bowel pattern    Goals  Eliminate sugar sweetened beverages, Food journal, Complete lession plans 1-6, Eat 3 meals per day, and Eliminate mindless snacking  Recommend an adult multivitamin and mineral supplement and Vitamin D3 2000 IU per day   Complete ultrasound as ordered  Repeat blood work as ordered by pCP   Food log on baritastic   Track fluid intake   Nicotine blood work     Time Spent:   1 Hour

## 2024-06-17 NOTE — PROGRESS NOTES
Bariatric Behavioral Health Evaluation     weight check    Presenting Problem:   Annette Cooper  is a 29 y.o.   male    :  1995   Patient presented with overall concerns of obesity.  Stated that weight has impacted quality of life and has current future concerns with lack of mobility, movement, chronic pain, and overall health.  Has attempted various weight loss plans in the past including healthier choices and exercise.     Patient is Interested in exploring bariatric surgery.as an option for  weight loss goals.      Is the patient seeking Bariatric Surgery?   YES  Some peers are post surgery with success.    Has been considering bariatric for a few months.     Current Barriers to Change: none noted.  Stated he is ready for self care and change     Realizes Post- Op Requirements? Yes, and will learn more meeting with the dietitians and social workers through the process     Pre-morbid level of function and history of present illness: STACI  history of poor sleep.  HTN w/ Rx    Stressors and Supports:  Mother and father are primary support.      Living situation:   solo.      Eating behaviors:  three meals a day.  Some snacking.  Dinner at mother's home.  Outside food from [employer]  and Chinese food.   Aware of poor choices and ready to make change.  History of liquid calories.  Eats a lot bread.     Work:   not working by choice.   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    Physical Activity:  has an Apple watch, but not using at this time.        Family History (medical, traditions, culture, rules/routines around food):   No weight concerns noted in the past.        Mental Health, Trauma and Substance use Assessment    Psychiatric/Psychological Treatment Diagnosis:   denies MH.     History of Eating Disorder:  none      Outpatient Counselor No       Psychiatrist No     Have you had any Mental Health Higher level of care (ED, PHP or Inpatient ) Treatment? No      Drug and/or Alcohol use  and treatment history: None reported    Have you had any Substance Use Treatment? No     Tobacco/Vaping History: No  Patient agrees to remain nicotine free post surgery.      Abuse or Trauma History: None reported      Risk Assessment    Identified support system intact.     Risk of harm to self or others:  none noted during evaluation  No HI/SI      Presence of Audio/Visual Hallucinations: Not reported during evaluation     Access to weapons: Not reported during evaluation     Observation: this interview only (SW and RD will follow Josep Cooper through the bariatric program)     Based on the previous information, the client presents the following risk of harm to self or others:  Low risk        Physical/Mental Health Status:              Appearance: appropriate           Sociability: average           Affect: appropriate           Mood: calm           Thought Process: coherent           Speech: normal           Content: no impairment           Orientation: person  Yes , place  Yes , time  Yes , normal attention span  Yes , normal memory  Yes   and normal judgement  Yes            Insight: emotional  good       BARIATRIC SURGERY EDUCATION CHECKLIST    Patient has received the following education related to the bariatric surgery process and understands:    Patients may be required to complete a psychiatric evaluation and receive clearance for surgery from mental health provider.    Patients who undergo weight loss surgery are at higher risk of increased mental health concerns and suicide attempts.    Patients may be required to complete a full substance abuse evaluation and then complete all treatment recommendations prior to surgery.    If diagnosis of abuse/dependence results, patient may be required to remain sober for one (1) year before having bariatric surgery.    Patients on psychiatric medications should check with their provider to discuss psychiatric medications and the changes in absorption.  Patient  should discuss all time release medications with provider and take all medications as prescribed.    The recommendation is that there is no use of any tobacco products, Hookah or vapes for the bariatric post-operation patient.    Bariatric surgery patients should not consume alcohol as a post-operative patient as it may increase risk of numerous health conditions including but not limited to alcohol abuse and ulcers.    There is a possibility of weight regain if patient does not follow all program guidelines and recommendations.    Bariatric surgery patients should exercise thirty (30) to sixty (60) minutes per day to maintain post-surgical weight loss.    Research indicates that bariatric patients are more successful when they see a therapist for up to two (2) years post-op.    Patients will follow all medical and dietary recommendations provided.    Patient will keep all scheduled appointments and follow up with their physician for a minimum of five (5) years.    Patient will take all vitamins as recommended.  Post-operative vitamins are life-long.    There is a goal month set.  All requirements should be met by this time. Don't wait to get started!    There is a deadline month set.  All requirements must be finished by this time and if not, the patient will be halted in the surgery process. The patient can be referred to the medical weight management program or can come back to the surgical program once the unfinished tasks from the previous program are completed.      Female patients of childbearing years are informed that pregnancy is not recommended until 12 - 18 months post-op.      Recommendations: Recommended for surgery  yes    Social Service Note:  Patient presented for behavioral health evaluation for the bariatric program.   Negative for  Mental Health (denied).    No reported history of therapy.   No reported history of Psychiatry.   No reported Drug and/or Alcohol abuse or treatment.  No reported  tobacco use.  Patient educated regarding the impact of nicotine and alcohol on the post surgery bariatric patient. Patient has a negative family history of tobacco and alcohol addiction.     Patient has not reported contraindications for bariatric surgery.  Patient will begin making changes with relationship with food through behavior modifications and mindful techniques.  Tracking food intake for one week every three months can assist with weight maintenance and self accountability for post surgery success.   and Dietitian follow up visits are available for pre and post surgery support.  Bariatric support group is available monthly.   Patient verbalized the ability to start making changes and create a healthy relationship around nutrition.     Patient meets criteria for surgery at this time and is referred to the bariatric surgeon.      However, patient can not schedule initial consult with the bariatric surgeon until there is a negative nicotine test in patient's chart.   Negative Nicotine test is due 90 days from today ( on 9/17/2024 ).  After the negative nicotine test is recorded, patient can proceed to schedule with the bariatric surgeon.          Ryan Collins LCSW

## 2024-07-03 ENCOUNTER — TELEPHONE (OUTPATIENT)
Dept: SLEEP CENTER | Facility: CLINIC | Age: 29
End: 2024-07-03

## 2024-07-03 NOTE — TELEPHONE ENCOUNTER
Sleep study resulted and shows moderate sleep apnea. APAP ordered.    Called patient and left message advising I will send a [employer] message with the sleep study results and next steps.  Provided sleep center number([phone]) to call if any questions.

## 2024-07-16 ENCOUNTER — OFFICE VISIT (OUTPATIENT)
Dept: BARIATRICS | Facility: CLINIC | Age: 29
End: 2024-07-16
Payer: COMMERCIAL

## 2024-07-16 ENCOUNTER — CLINICAL SUPPORT (OUTPATIENT)
Dept: BARIATRICS | Facility: CLINIC | Age: 29
End: 2024-07-16

## 2024-07-16 VITALS — BODY MASS INDEX: 62.14 KG/M2 | WEIGHT: 315 LBS

## 2024-07-16 VITALS
RESPIRATION RATE: 16 BRPM | HEIGHT: 73 IN | DIASTOLIC BLOOD PRESSURE: 92 MMHG | BODY MASS INDEX: 41.75 KG/M2 | TEMPERATURE: 98.9 F | SYSTOLIC BLOOD PRESSURE: 142 MMHG | WEIGHT: 315 LBS | HEART RATE: 92 BPM

## 2024-07-16 DIAGNOSIS — E66.01 MORBID OBESITY WITH BMI OF 60.0-69.9, ADULT (HCC): Primary | ICD-10-CM

## 2024-07-16 DIAGNOSIS — R79.89 ELEVATED LFTS: ICD-10-CM

## 2024-07-16 DIAGNOSIS — G47.30 SLEEP APNEA, UNSPECIFIED TYPE: ICD-10-CM

## 2024-07-16 DIAGNOSIS — E66.01 MORBID OBESITY WITH BMI OF 60.0-69.9, ADULT (HCC): ICD-10-CM

## 2024-07-16 DIAGNOSIS — Z01.818 PRE-OPERATIVE CLEARANCE: Primary | ICD-10-CM

## 2024-07-16 DIAGNOSIS — I10 PRIMARY HYPERTENSION: ICD-10-CM

## 2024-07-16 PROCEDURE — RECHECK: Performed by: DIETITIAN, REGISTERED

## 2024-07-16 PROCEDURE — 99214 OFFICE O/P EST MOD 30 MIN: CPT | Performed by: INTERNAL MEDICINE

## 2024-07-16 RX ORDER — TIRZEPATIDE 2.5 MG/.5ML
2.5 INJECTION, SOLUTION SUBCUTANEOUS WEEKLY
Qty: 2 ML | Refills: 0 | Status: SHIPPED | OUTPATIENT
Start: 2024-07-16 | End: 2024-08-13

## 2024-07-16 NOTE — PROGRESS NOTES
Assessment/Plan:  Josep was seen today for consult.    Diagnoses and all orders for this visit:    Morbid obesity with BMI of 60.0-69.9, adult (HCC)  -     tirzepatide (Zepbound) 2.5 mg/0.5 mL auto-injector; Inject 0.5 mL (2.5 mg total) under the skin once a week for 28 days       - Discussed options of HealthyCORE-Intensive Lifestyle Intervention Program, Very Low Calorie Diet-VLCD, and Conservative Program and the role of weight loss medications.  - Explained the importance of making lifestyle changes first before starting anti-obesity medications.  - Patient should demonstrate lifestyle changes first before anti-obesity medication initiated.   - Patient is interested in pursuing Conservative Program  - Initial weight loss goal of 5-10% weight loss for improved health as studies have shown this is where we see the greatest impact on improving health and decreasing risk of obesity related conditions.  - Weight loss can improve patient's co-morbid conditions and/or prevent weight-related complications.  - Labs reviewed: As below.      General Recommendations:  Nutrition:  Eat breakfast daily.  Do not skip meals.     Food log (ie.) www.[employer].com, sparkpeople.com, loseit.com, calorieking.com, etc.    Practice mindful eating.  Be sure to set aside time to eat, eat slowly, and savor your food.    Hydration:    At least 64oz of water daily.  No sugar sweetened beverages.  No juice (eat the fruit instead).    Exercise:  Studies have shown that the ideal exercise goal is somewhere between 150 to 300 minutes of moderate intensity exercise a week.  Start with exercising 10 minutes every other day and gradually increase physical activity with a goal of at least 150 minutes of moderate intensity exercise a week, divided over at least 3 days a week.  An example of this would be exercising 30 minutes a day, 5 days a week.  Resistance training can increase muscle mass and increase our resting metabolic rate.   FULL BODY  resistance training is recommended 2-3 times a week.  Do not do this on consecutive days to allow for muscle recovery.    Aim for a bare minimum 5000 steps, even on days you do not exercise.    Monitoring:   Weigh yourself daily.  If this causes undue stress, then just weigh yourself once a week.  Weigh yourself the same time of the day with the same amount of clothing on.  Preferably this should be done after waking up, before you eat, and with no clothing or minimal clothing on.    Specific Goals:  Food log (ie.) www.[employer].com,sparkpeople.com,loseit.com,[employer].com,etc.   No sugary beverages. At least 64oz of water daily.  Gradually increase physical activity to a goal of 5 days per week for 30 minutes of MODERATE intensity PLUS 2 days per week of FULL BODY resistance training    START ZEPBOUND  I have sent Zebound to your pharmacy. The prior authorization process will been done through our prior authorization team and can take up to 3 weeks to process through the insurance.      - Start Zepbound 2.5 mg subcutaneously injection weekly.  You will need a nurse visit in 4 weeks.  If you are doing well at that time the dose will be increased to Zepbound 5mg weekly.     - Side effects of Zepbound include nausea, vomiting, diarrhea, or constipation. Keep an eye on your heart rate while on Zepbound. If you resting heart rate is greater than 100 beats per minutes, please notify me. If you develop severe abdominal pain, stop Zepbound and go to the emergency room, as that could be a sign of pancreatitis.      - Please notify me if you have surgery, upper endoscopy, or colonoscopy scheduled, as we typically hold Zepbound for one week prior to the procedure.   - Zepbound can reduce the effectiveness of oral hormonal birth control (birth control pills). Recommend a barrier backup method such as condoms to prevent pregnancy.       Nurse visit in one month (7/14/24).  If you are doing fine then we will increase to  Zepbound 5mg weekly at that time.        Total time spent reviewing chart, interviewing patient, examining patient, discussing plan, answering all questions, and documentin min.       ______________________________________________________________________        Subjective:   Chief Complaint   Patient presents with    Consult     MWM Consult; GW 270lb; Waist 63in; Stop Bang        HPI: Josep Cooper  is a 29 y.o. male with history of HTN, STACI,  and excess weight, here to pursue weight loss management.  Previous notes and records have been reviewed.    HPI  Wt Readings from Last 20 Encounters:   24 (!) 214 kg (471 lb)   24 (!) 214 kg (471 lb)   24 (!) 215 kg (474 lb 9.6 oz)   24 (!) 215 kg (474 lb 9.6 oz)   24 (!) 213 kg (469 lb)   24 (!) 216 kg (477 lb)   23 (!) 210 kg (464 lb)   10/26/23 (!) 203 kg (448 lb)   19 (!) 168 kg (370 lb)     Excess Weight:  Highest weight: 477  Current weight: 471  Goal 270  What has been tried: Diet and Exercise -  lost 60lbs  Contributing factors: Poor Food Choices and Insufficient Physical Activity  Dining out:  Previously 2-3x/w, not he has stopped  Hydration:  Water 80oz  Alcohol:  No  Smoking:  Recently quit  Exercise: Infrequent walks  Weight Monitoring:  No  Sleep:  6-7  Occupation:  Former .  Unemployeed since .    Past Medical History:   Diagnosis Date    Concussion with loss of consciousness 2017    Last Assessment & Plan: Formatting of this note might be different from the original. Requesting records from [facility]    Hypertension      Patient denies personal and family history of pancreatitis, thyroid cancer, MEN-2 tumors.  Denies any hx of glaucoma, seizures, kidney stones, gallstones.  Denies Hx of CAD, PAD, palpitations, arrhythmia.   Denies uncontrolled anxiety or depression, suicidal behavior or thinking , insomnia or sleep disturbance.   History reviewed. No pertinent  "surgical history.  The following portions of the patient's history were reviewed and updated as appropriate: allergies, current medications, past family history, past medical history, past social history, past surgical history, and problem list.    Review Of Systems:  Review of Systems   Constitutional:  Negative for activity change, appetite change, chills, fatigue and fever.   HENT:  Negative for trouble swallowing.    Respiratory:  Negative for cough and shortness of breath.    Cardiovascular:  Negative for chest pain, palpitations and leg swelling.   Gastrointestinal:  Negative for abdominal pain, constipation, diarrhea, nausea and vomiting.   Endocrine: Negative for cold intolerance and heat intolerance.   Genitourinary:  Negative for difficulty urinating and dysuria.   Musculoskeletal:  Negative for arthralgias, back pain, gait problem and myalgias.   Skin:  Negative for pallor and rash.   Neurological:  Negative for headaches.   Psychiatric/Behavioral:  Negative for dysphoric mood and suicidal ideas. The patient is not nervous/anxious.        Objective:  /92   Pulse 92   Temp 98.9 °F (37.2 °C)   Resp 16   Ht 6' 1\" (1.854 m)   Wt (!) 214 kg (471 lb)   BMI 62.14 kg/m²   Physical Exam  Vitals and nursing note reviewed.   Constitutional:       General: He is not in acute distress.     Appearance: Normal appearance. He is not ill-appearing or diaphoretic.   Eyes:      General: No scleral icterus.  Cardiovascular:      Rate and Rhythm: Normal rate and regular rhythm.      Pulses: Normal pulses.      Heart sounds: Normal heart sounds. No murmur heard.  Pulmonary:      Effort: Pulmonary effort is normal. No respiratory distress.      Breath sounds: Normal breath sounds. No stridor. No wheezing or rhonchi.   Abdominal:      General: Bowel sounds are normal. There is no distension.      Palpations: Abdomen is soft. There is no mass.      Tenderness: There is no abdominal tenderness.   Musculoskeletal:      " Cervical back: Neck supple.      Right lower leg: No edema.      Left lower leg: No edema.   Lymphadenopathy:      Cervical: No cervical adenopathy.   Skin:     Capillary Refill: Capillary refill takes less than 2 seconds.      Findings: No lesion or rash.   Neurological:      Mental Status: He is alert and oriented to person, place, and time.      Gait: Gait normal.   Psychiatric:         Mood and Affect: Mood normal.         Behavior: Behavior normal.       Labs and Imaging  Recent labs and imaging have been personally reviewed.  Lab Results   Component Value Date    WBC 11.17 (H) 12/22/2023    HGB 15.0 12/22/2023    HCT 45.8 12/22/2023    MCV 90 12/22/2023     12/22/2023     Lab Results   Component Value Date    SODIUM 139 12/22/2023    K 3.6 12/22/2023     12/22/2023    CO2 25 12/22/2023    AGAP 10 12/22/2023    BUN 11 12/22/2023    CREATININE 0.77 12/22/2023    GLUC 93 12/22/2023    CALCIUM 9.6 12/22/2023    AST 42 (H) 12/22/2023    ALT 69 (H) 12/22/2023    ALKPHOS 109 (H) 12/22/2023    TP 8.5 (H) 12/22/2023    TBILI 0.65 12/22/2023    EGFR 123 12/22/2023     Lab Results   Component Value Date    HGBA1C 5.5 12/22/2023     Lab Results   Component Value Date    VHP9BUIAYNFG 1.637 10/26/2023     Lab Results   Component Value Date    CHOLESTEROL 153 12/22/2023     Lab Results   Component Value Date    HDL 43 12/22/2023     Lab Results   Component Value Date    TRIG 130 12/22/2023     Lab Results   Component Value Date    LDLCALC 84 12/22/2023

## 2024-07-16 NOTE — PATIENT INSTRUCTIONS
- Discussed options of HealthyCORE-Intensive Lifestyle Intervention Program, Very Low Calorie Diet-VLCD, and Conservative Program and the role of weight loss medications.  - Explained the importance of making lifestyle changes first before starting anti-obesity medications.  - Patient should demonstrate lifestyle changes first before anti-obesity medication initiated.   - Patient is interested in pursuing Conservative Program  - Initial weight loss goal of 5-10% weight loss for improved health as studies have shown this is where we see the greatest impact on improving health and decreasing risk of obesity related conditions.  - Weight loss can improve patient's co-morbid conditions and/or prevent weight-related complications.  - Labs reviewed: As below.      General Recommendations:  Nutrition:  Eat breakfast daily.  Do not skip meals.     Food log (ie.) www.[employer]com, sparkpeople.com, loseit.com, calorieking.com, etc.    Practice mindful eating.  Be sure to set aside time to eat, eat slowly, and savor your food.    Hydration:    At least 64oz of water daily.  No sugar sweetened beverages.  No juice (eat the fruit instead).    Exercise:  Studies have shown that the ideal exercise goal is somewhere between 150 to 300 minutes of moderate intensity exercise a week.  Start with exercising 10 minutes every other day and gradually increase physical activity with a goal of at least 150 minutes of moderate intensity exercise a week, divided over at least 3 days a week.  An example of this would be exercising 30 minutes a day, 5 days a week.  Resistance training can increase muscle mass and increase our resting metabolic rate.   FULL BODY resistance training is recommended 2-3 times a week.  Do not do this on consecutive days to allow for muscle recovery.    Aim for a bare minimum 5000 steps, even on days you do not exercise.    Monitoring:   Weigh yourself daily.  If this causes undue stress, then just weigh yourself  once a week.  Weigh yourself the same time of the day with the same amount of clothing on.  Preferably this should be done after waking up, before you eat, and with no clothing or minimal clothing on.    Specific Goals:  Food log (ie.) www.[employer]itness[employer].com,[employer].com,[employer].com,[employer].com,etc.   No sugary beverages. At least 64oz of water daily.  Gradually increase physical activity to a goal of 5 days per week for 30 minutes of MODERATE intensity PLUS 2 days per week of FULL BODY resistance training    START ZEPBOUND  I have sent Zebound to your pharmacy. The prior authorization process will been done through our prior authorization team and can take up to 3 weeks to process through the insurance.      - Start Zepbound 2.5 mg subcutaneously injection weekly.  You will need a nurse visit in 4 weeks.  If you are doing well at that time the dose will be increased to Zepbound 5mg weekly.     - Side effects of Zepbound include nausea, vomiting, diarrhea, or constipation. Keep an eye on your heart rate while on Zepbound. If you resting heart rate is greater than 100 beats per minutes, please notify me. If you develop severe abdominal pain, stop Zepbound and go to the emergency room, as that could be a sign of pancreatitis.      - Please notify me if you have surgery, upper endoscopy, or colonoscopy scheduled, as we typically hold Zepbound for one week prior to the procedure.   - Zepbound can reduce the effectiveness of oral hormonal birth control (birth control pills). Recommend a barrier backup method such as condoms to prevent pregnancy.       Nurse visit in one month (7/14/24).  If you are doing fine then we will increase to Zepbound 5mg weekly at that time.

## 2024-07-16 NOTE — PROGRESS NOTES
Bariatric Nutrition Assessment Note    Type of surgery    Preop- interested in the sleeve gastrectomy   Surgery Date: TBD- 6 month program requirement   Today is 1/6   Surgeon: Dr. Raul Knutson  Per surgeon note :     I have told him to lose 46 lbs prior to the operation.  His BMI is 60 and we need to help him lose weight to make sure that his surgery is safer.     Nutrition Assessment   Josep Cooper  29 y.o.  male     Wt with BMI of 25: 194.8  Pre-Op Excess Wt: 274.2 lbs     Wt (!) 214 kg (471 lb)   BMI 62.14 kg/m²    - 3.5# x 1 month     Wt Readings from Last 3 Encounters:   06/17/24 (!) 215 kg (474 lb 9.6 oz)   04/18/24 (!) 215 kg (474 lb 9.6 oz)   04/12/24 (!) 213 kg (469 lb)        Colette Alexander Equation:    ITJ=[id number]  Weight Maintenance 3593  Estimated calories for weight loss 2272-4088 ( 1-2# per wk wt loss - sedentary )  Estimated protein needs 88.5-133 grams (1.0-1.5 gms/kg IBW )   Estimated fluid needs 88.5-103 oz per day (30-35 ml/kg IBW )      Weight History   Onset of Obesity: Childhood  Family history of obesity: No  Wt Loss Attempts: Exercise  Self Created Diets (Portion Control, Healthy Food Choices, etc.)  Patient has tried the above for 6 months or more with insufficient weight loss or weight regain, which is why patient has requested to be evaluated for weight loss surgery today  Maximum Wt Lost: 60 lbs with diet and exercise       Review of History and Medications   Past Medical History:   Diagnosis Date    Concussion with loss of consciousness 04/18/2017    Last Assessment & Plan: Formatting of this note might be different from the original. Requesting records from [facility]    Hypertension      No past surgical history on file.  Social History     Socioeconomic History    Marital status: Single     Spouse name: Not on file    Number of children: 0    Years of education: Not on file    Highest education level: Not on file   Occupational History    Not on file   Tobacco Use     Smoking status: Former     Current packs/day: 0.00     Average packs/day: 0.3 packs/day for 11.7 years (2.9 ttl pk-yrs)     Types: Cigarettes     Start date:      Quit date: 2023     Years since quittin.8    Smokeless tobacco: Never   Vaping Use    Vaping status: Former   Substance and Sexual Activity    Alcohol use: Never    Drug use: Never    Sexual activity: Not on file   Other Topics Concern    Not on file   Social History Narrative    Not on file     Social Determinants of Health     Financial Resource Strain: Low Risk  (10/26/2023)    Overall Financial Resource Strain (CARDIA)     Difficulty of Paying Living Expenses: Not hard at all   Food Insecurity: No Food Insecurity (10/26/2023)    Hunger Vital Sign     Worried About Running Out of Food in the Last Year: Never true     Ran Out of Food in the Last Year: Never true   Transportation Needs: No Transportation Needs (10/26/2023)    PRAPARE - Transportation     Lack of Transportation (Medical): No     Lack of Transportation (Non-Medical): No   Physical Activity: Not on file   Stress: Not on file   Social Connections: Not on file   Intimate Partner Violence: Not on file   Housing Stability: Low Risk  (4/3/2024)    Housing Stability Vital Sign     Unable to Pay for Housing in the Last Year: No     Number of Times Moved in the Last Year: 1     Homeless in the Last Year: No       Current Outpatient Medications:     lisinopril (ZESTRIL) 10 mg tablet, Take 1 tablet (10 mg total) by mouth daily, Disp: 30 tablet, Rfl: 2      Food Intake and Lifestyle Assessment   Food Intake Assessment completed via usual diet recall  Breakfast: Varies when he wakes up   Generally around 10 am   Bagel and cream cheese or egg sandwich   Snack: 0   Lunch: 12 noon  to 2 pm   [place] or hot pocket   Snack: 0  Dinner: 8 pm - rice chicken and beans   Snack: 0  Beverage intake: water and juice  Protein supplement: none   Estimated protein intake per day: ~80-90 grams   Estimated  "fluid intake per day: \"not enough\"   Meals eaten away from home: Once to twice per week   Typical meal pattern: 3 meals per day and 0-1 snacks per day  Eating Behaviors: Consumption of high calorie/ high fat foods and Large portion sizes  Food allergies or intolerances:     Allergies   Allergen Reactions    Tuberculin Ppd Other (See Comments)     Other reaction(s): Positive skin tests in past     Cultural or Synagogue considerations:      Physical Assessment  Physical Activity  Types of exercise: walking, housework and laundry   Current physical limitations: none     Psychosocial Assessment   Support systems: mom and dad and friends   Socioeconomic factors: lives alone,   Pt lost a friend 2 weeks ago and is still grieving.  Not himself at this time.     Nutrition Diagnosis  Diagnosis: Overweight / Obesity (NC-3.3)  Related to: Physical inactivity and Excessive energy intake  As Evidenced by: BMI >25 and Unintentional weight gain     Nutrition Prescription: Recommend the following diet  5142-1115 calories/ 130-135 grams protein     Interventions and Teaching   Discussed pre-op and post-op nutrition guidelines.       Patient educated and handouts provided.  Surgical changes to stomach / GI  Capacity of post-surgery stomach  Diet progression  Adequate hydration  Sugar and fat restriction to decrease \"dumping syndrome\"  Fat restriction to decrease steatorrhea  Expected weight loss  Weight loss plateaus/ possibility of weight regain  Exercise  Suggestions for pre-op diet  Nutrition considerations after surgery  Protein supplements  Meal planning and preparation  Appropriate carbohydrate, protein, and fat intake, and food/fluid choices to maximize safe weight loss, nutrient intake, and tolerance   Dietary and lifestyle changes  Possible problems with poor eating habits  Intuitive eating  Techniques for self monitoring and keeping daily food journal  Potential for food intolerance after surgery, and ways to deal " with them including: lactose intolerance, nausea, reflux, vomiting, diarrhea, food intolerance, appetite changes, gas  Vitamin / Mineral supplementation of Multivitamin with minerals and Vitamin D    Pt with elevated LFTs- explained that PCP ordered liver ultrasound and per policy he needs an ultrasound due to elevated LFTs  Pt admitted to vaping on the weekend - ordered nicotine test     Education provided to: patient    Barriers to learning: No barriers identified    Readiness to change: contemplation and preparation    Prior research on procedure: discussed with provider    Comprehension: needs reinforcement and verbalizes understanding     Expected Compliance: good    Recommendations  Pt is an appropriate candidate for surgery. Yes  Pt should make the following changes and then be reassessed for weight loss surgery:   Pt needs to have a negative nicotine test prior to proceeding to surgery by 9/17/2024( 90 days )     Workflow: (Incomplete in Bold):  Psych and/or D+A Clearance: N/A  Blood Work: ordered   PCP letter: done  Surgeon Appt: done  EGD: Needs negative nicotine test   Cardiac Risk Assessment with ECG: referral placed   Sleep Studies:done  Nicotine test: by 9/17/2024  Pre-Operative Program: 1/6  NAFLD Score Calculated: n/a  Hepatology consult: Elevated LFTs      Evaluation / Monitoring  Dietitian to Monitor: Eating pattern as discussed Tolerance of nutrition prescription Body weight Lab values Physical activity Bowel pattern  Patient is taking a men's health vitamin and mineral daily.  He still needs to  vitamin D3  as recommended.  He has increased his fluid intake to 5 bottles of water per day and sometimes Gatorade zero. He has reduced eating out which contributed to some of his recent weight loss. Unfortunately, a friend of his recently passed away, so he hasn't been focused on lifestyle changes. Reviewed use of the [employer] erwin because at last appointment he was unable to download the erwin.  Entered in calories and protein needs. Patient requested assistance with pre op weight loss and will meet with one of our MWM providers for a consultation to see if medication is an option. Per Dr Knutson, pt is to lose 45# prior to surgery     Goals  Eliminate sugar sweetened beverages, Food journal, Complete lession plans 1-6, Eat 3 meals per day, and Eliminate mindless snacking  Recommend Vitamin D3 2000 IU per day   Complete ultrasound as ordered & schedule liver consult   Repeat blood work as ordered by pCP   Food log on baritastic - 2600 calories  Consult with MWM for possible medication   Quit smoking and be nicotine free by 9/17/2024  F/U in one month with RD    Time Spent:   30 minutes

## 2024-07-17 ENCOUNTER — TELEPHONE (OUTPATIENT)
Dept: BARIATRICS | Facility: CLINIC | Age: 29
End: 2024-07-17

## 2024-07-17 NOTE — TELEPHONE ENCOUNTER
PA for Zepbound 2.5 mg    Submitted via    [x]CMM-KEY [id number]  []Surescripts-Case ID #    []Faxed to plan   []Other website    []Phone call Case ID #      Office notes sent, clinical questions answered. Awaiting determination    Turnaround time for your insurance to make a decision on your Prior Authorization can take 7-21 business days.

## 2024-07-18 ENCOUNTER — VBI (OUTPATIENT)
Dept: ADMINISTRATIVE | Facility: OTHER | Age: 29
End: 2024-07-18

## 2024-07-18 NOTE — TELEPHONE ENCOUNTER
PA for Zepbound 2.5 mg Approved     Date(s) approved 7/17/24-1/13/25      Patient advised by          [] MyChart Message  [] Phone call   [x]LMOM  []L/M to call office as no active Communication consent on file  []Unable to leave detailed message as VM not approved on Communication consent       Pharmacy advised by    []Fax  [x]Phone call    Approval letter scanned into Media Yes

## 2024-08-03 ENCOUNTER — TELEPHONE (OUTPATIENT)
Dept: SLEEP CENTER | Facility: CLINIC | Age: 29
End: 2024-08-03

## 2024-08-09 LAB

## 2024-08-12 LAB
DME PARACHUTE DELIVERY DATE ACTUAL: NORMAL
DME PARACHUTE DELIVERY DATE EXPECTED: NORMAL
DME PARACHUTE DELIVERY DATE REQUESTED: NORMAL
DME PARACHUTE ITEM DESCRIPTION: NORMAL
DME PARACHUTE ORDER STATUS: NORMAL
DME PARACHUTE SUPPLIER NAME: NORMAL
DME PARACHUTE SUPPLIER PHONE: NORMAL

## 2024-08-14 ENCOUNTER — CLINICAL SUPPORT (OUTPATIENT)
Dept: BARIATRICS | Facility: CLINIC | Age: 29
End: 2024-08-14

## 2024-08-14 VITALS
BODY MASS INDEX: 41.75 KG/M2 | SYSTOLIC BLOOD PRESSURE: 135 MMHG | TEMPERATURE: 97.1 F | RESPIRATION RATE: 17 BRPM | HEIGHT: 73 IN | WEIGHT: 315 LBS | DIASTOLIC BLOOD PRESSURE: 84 MMHG | HEART RATE: 78 BPM

## 2024-08-14 VITALS — BODY MASS INDEX: 60.32 KG/M2 | WEIGHT: 315 LBS

## 2024-08-14 DIAGNOSIS — E66.01 MORBID OBESITY WITH BMI OF 60.0-69.9, ADULT (HCC): Primary | ICD-10-CM

## 2024-08-14 DIAGNOSIS — R63.5 ABNORMAL WEIGHT GAIN: Primary | ICD-10-CM

## 2024-08-14 PROCEDURE — RECHECK

## 2024-08-14 PROCEDURE — RECHECK: Performed by: DIETITIAN, REGISTERED

## 2024-08-14 RX ORDER — TIRZEPATIDE 5 MG/.5ML
5 INJECTION, SOLUTION SUBCUTANEOUS WEEKLY
Qty: 2 ML | Refills: 1 | Status: SHIPPED | OUTPATIENT
Start: 2024-08-14 | End: 2024-10-09

## 2024-08-14 NOTE — PROGRESS NOTES
Bariatric Nutrition Assessment Note    Type of surgery    Preop- interested in the sleeve gastrectomy   Surgery Date: TBD- 6 month program requirement   Today is 2/6   Surgeon: Dr. Raul Knutson  Per surgeon note :     I have told him to lose 46 lbs prior to the operation.  His BMI is 60 and we need to help him lose weight to make sure that his surgery is safer.     Nutrition Assessment   Josep Cooper  29 y.o.  male     Wt with BMI of 25: 194.8  Pre-Op Excess Wt: 274.2 lbs     Wt (!) 207 kg (457 lb 3.2 oz)   BMI 60.32 kg/m²    --17.4# x 4 months (4% of body weight )   Pt is taking zepbound as prescribed.     Wt Readings from Last 3 Encounters:   08/14/24 (!) 207 kg (457 lb 3.2 oz)   07/16/24 (!) 214 kg (471 lb)   07/16/24 (!) 214 kg (471 lb)        Colette Alexander Equation:    WOE=0745  Weight Maintenance 3593  Estimated calories for weight loss 8345-0257 ( 1-2# per wk wt loss - sedentary )  Estimated protein needs 88.5-133 grams (1.0-1.5 gms/kg IBW )   Estimated fluid needs 88.5-103 oz per day (30-35 ml/kg IBW )      Weight History   Onset of Obesity: Childhood  Family history of obesity: No  Wt Loss Attempts: Exercise  Self Created Diets (Portion Control, Healthy Food Choices, etc.)  Patient has tried the above for 6 months or more with insufficient weight loss or weight regain, which is why patient has requested to be evaluated for weight loss surgery today  Maximum Wt Lost: 60 lbs with diet and exercise       Review of History and Medications   Past Medical History:   Diagnosis Date    Concussion with loss of consciousness 04/18/2017    Last Assessment & Plan: Formatting of this note might be different from the original. Requesting records from [facility]    Hypertension      No past surgical history on file.  Social History     Socioeconomic History    Marital status: Single     Spouse name: Not on file    Number of children: 0    Years of education: Not on file    Highest education level: Not  on file   Occupational History    Not on file   Tobacco Use    Smoking status: Former     Current packs/day: 0.00     Average packs/day: 0.3 packs/day for 11.7 years (2.9 ttl pk-yrs)     Types: Cigarettes     Start date:      Quit date: 2023     Years since quittin.9    Smokeless tobacco: Never   Vaping Use    Vaping status: Former   Substance and Sexual Activity    Alcohol use: Never    Drug use: Never    Sexual activity: Not on file   Other Topics Concern    Not on file   Social History Narrative    Not on file     Social Determinants of Health     Financial Resource Strain: Low Risk  (10/26/2023)    Overall Financial Resource Strain (CARDIA)     Difficulty of Paying Living Expenses: Not hard at all   Food Insecurity: No Food Insecurity (10/26/2023)    Hunger Vital Sign     Worried About Running Out of Food in the Last Year: Never true     Ran Out of Food in the Last Year: Never true   Transportation Needs: No Transportation Needs (10/26/2023)    PRAPARE - Transportation     Lack of Transportation (Medical): No     Lack of Transportation (Non-Medical): No   Physical Activity: Not on file   Stress: Not on file   Social Connections: Not on file   Intimate Partner Violence: Not on file   Housing Stability: Low Risk  (4/3/2024)    Housing Stability Vital Sign     Unable to Pay for Housing in the Last Year: No     Number of Times Moved in the Last Year: 1     Homeless in the Last Year: No       Current Outpatient Medications:     lisinopril (ZESTRIL) 10 mg tablet, Take 1 tablet (10 mg total) by mouth daily (Patient not taking: Reported on 2024), Disp: 30 tablet, Rfl: 2      Food Intake and Lifestyle Assessment   Food Intake Assessment completed via usual diet recall  Breakfast: Varies when he wakes up   Generally around 10 am   Bagel and cream cheese or egg sandwich   Snack: 0   Lunch: 12 noon  to 2 pm   [place] or hot pocket   Snack: 0  Dinner: 8 pm - rice chicken and beans   Snack: 0  Beverage  "intake: water and juice  Protein supplement: none   Estimated protein intake per day: ~80-90 grams   Estimated fluid intake per day: \"not enough\"   Meals eaten away from home: Once to twice per week   Typical meal pattern: 3 meals per day and 0-1 snacks per day  Eating Behaviors: Consumption of high calorie/ high fat foods and Large portion sizes  Food allergies or intolerances:     Allergies   Allergen Reactions    Tuberculin Ppd Other (See Comments)     Other reaction(s): Positive skin tests in past     Cultural or Catholic considerations:      Physical Assessment  Physical Activity  Types of exercise: walking, housework and laundry   Current physical limitations: none     Psychosocial Assessment   Support systems: mom and dad and friends   Socioeconomic factors: lives alone,   Pt lost a friend 2 weeks ago and is still grieving.  Not himself at this time.     Nutrition Diagnosis  Diagnosis: Overweight / Obesity (NC-3.3)  Related to: Physical inactivity and Excessive energy intake  As Evidenced by: BMI >25 and Unintentional weight gain     Nutrition Prescription: Recommend the following diet  7246-2720 calories/ 130-135 grams protein   Pt has been food logging to 1400 calories or less will reduce calorie goal   1800 calories/ 90 grams protein     Interventions and Teaching   Discussed pre-op and post-op nutrition guidelines.       Patient educated and handouts provided.  Surgical changes to stomach / GI  Capacity of post-surgery stomach  Diet progression  Adequate hydration  Sugar and fat restriction to decrease \"dumping syndrome\"  Fat restriction to decrease steatorrhea  Expected weight loss  Weight loss plateaus/ possibility of weight regain  Exercise  Suggestions for pre-op diet  Nutrition considerations after surgery  Protein supplements  Meal planning and preparation  Appropriate carbohydrate, protein, and fat intake, and food/fluid choices to maximize safe weight loss, nutrient intake, and tolerance "   Dietary and lifestyle changes  Possible problems with poor eating habits  Intuitive eating  Techniques for self monitoring and keeping daily food journal  Potential for food intolerance after surgery, and ways to deal with them including: lactose intolerance, nausea, reflux, vomiting, diarrhea, food intolerance, appetite changes, gas  Vitamin / Mineral supplementation of Multivitamin with minerals and Vitamin D    Education provided to: patient    Barriers to learning: No barriers identified    Readiness to change: contemplation and preparation    Prior research on procedure: discussed with provider    Comprehension: needs reinforcement and verbalizes understanding     Expected Compliance: good    Recommendations  Pt is an appropriate candidate for surgery. Yes  Pt should make the following changes and then be reassessed for weight loss surgery:   Pt needs to have a negative nicotine test prior to proceeding to surgery by 9/17/2024( 90 days )     Workflow: (Incomplete in Bold):  Psych and/or D+A Clearance: N/A  Blood Work: ordered   PCP letter: done  Surgeon Appt: done  EGD: Needs negative nicotine test   Cardiac Risk Assessment with ECG: referral placed   MWM consult : 7/16/2024  Sleep Studies:done  Nicotine test: by 9/17/2024  Pre-Operative Program: 1/6  NAFLD Score Calculated: n/a  Hepatology consult: Elevated LFTs      Evaluation / Monitoring  Dietitian to Monitor: Eating pattern as discussed Tolerance of nutrition prescription Body weight Lab values Physical activity Bowel pattern  Patient is taking a men's health vitamin and mineral daily.  He is also taking vitamin D3 and fish oils daily.  He has increased his fluid intake to 5 bottles of water per day and sometimes Gatorade zero. He has reduced eating out which contributed to some of his recent weight loss. He only ate Fast food once over the past month. Patient is taking zepbound as ordered and reports decreased appetite and early satiety.  He has been  food logging to  1400 calories or less.  Adjusted calories and protein goals to lower amount.  Pt had flat tire on the way to get his ultrasound so he needs to reschedule it.  Pt is making lifestyle changes in preparation for surgery     Goals  Eliminate sugar sweetened beverages, Food journal, Complete lession plans 1-6, Eat 3 meals per day, and Eliminate mindless snacking  Complete ultrasound as ordered ( reschedule)   Repeat blood work as ordered by pCP   Food log on baritastic - 1800 calories or less   Quit smoking and be nicotine free by 9/17/2024  Practice not drinking before and during meals       Time Spent:   30 minutes

## 2024-08-14 NOTE — PROGRESS NOTES
Patient last visit weight:471 lb  Patient current visit weight: 457.2 lb    If you are taking phentermine or other oral weight loss medications, are you experiencing any of the following symptoms:  Headache:   Blurred Vision:   Chest Pain:   Palpitations:  Insomnia:   SPECIFY ORAL MEDICATION AND DOSAGE:     If you are taking an injectable medication,  are you experiencing any of the following symptoms:  Bloating:   Nausea:  Vomiting:   Constipation:   Diarrhea:  SPECIFY INJECTABLE MEDICATION AND CURRENT DOSAGE: Zepbound 2.5 mg   Needs a dose increase , no issues with previous dose.      Vitals:     Is BP less than 100/60? No  Is BP greater than 140/90? No  Is HR greater than 100? No  **If yes to any of the above, have patient relax and repeat in 5-10 minutes**    Repeat values:    Is BP less than 100/60?  Is BP greater than 140/90?  Is HR greater than 100?  **If values remain outside of ranges above, please consult provider for next steps**

## 2024-08-14 NOTE — PATIENT INSTRUCTIONS
Reschedule ultrasound of liver  Get blood work completed  Quit smoking by 9/17/2024  Practice not drinking before and during meals

## 2024-09-11 NOTE — PROGRESS NOTES
Behavioral Health Follow up.    HOLD:  deadline  on 9/17/2024  - discussed and he is aware.      Preop- interested in the sleeve gastrectomy   Surgery Date: TBD- 6 month program requirement     4 / 6 weight check   ( Saw MW provider in July)     Starting weight:  469 #  Weight 451.8 #  Surgeon: Dr. Raul Knutson    Stated he has reduced his portion sizes.  Can hydrate more.   Currently on Zepbound - and does not have a refill.   Would like to return to work after weight loss/ surgery.

## 2024-09-12 ENCOUNTER — CLINICAL SUPPORT (OUTPATIENT)
Dept: BARIATRICS | Facility: CLINIC | Age: 29
End: 2024-09-12

## 2024-09-12 ENCOUNTER — TELEPHONE (OUTPATIENT)
Dept: BARIATRICS | Facility: CLINIC | Age: 29
End: 2024-09-12

## 2024-09-12 VITALS — BODY MASS INDEX: 59.61 KG/M2 | WEIGHT: 315 LBS

## 2024-09-12 DIAGNOSIS — E66.01 CLASS 3 SEVERE OBESITY DUE TO EXCESS CALORIES WITHOUT SERIOUS COMORBIDITY WITH BODY MASS INDEX (BMI) OF 60.0 TO 69.9 IN ADULT (HCC): Primary | ICD-10-CM

## 2024-09-12 PROCEDURE — RECHECK

## 2024-09-12 RX ORDER — TIRZEPATIDE 7.5 MG/.5ML
7.5 INJECTION, SOLUTION SUBCUTANEOUS WEEKLY
Qty: 2 ML | Refills: 1 | Status: SHIPPED | OUTPATIENT
Start: 2024-09-12

## 2024-09-12 NOTE — TELEPHONE ENCOUNTER
Patient requesting a dose increase of his Zepbound to go to [facility] on file already. Patient tolerated previous dose without any incident.

## 2024-09-13 ENCOUNTER — TELEPHONE (OUTPATIENT)
Dept: BARIATRICS | Facility: CLINIC | Age: 29
End: 2024-09-13

## 2024-09-13 NOTE — TELEPHONE ENCOUNTER
PA for Zepbound 7.5 mg  APPROVED     Date(s) approved 9/13/24    No need for clinical review has active authorization on file    Patient advised by          []MyChart Message  [x]Phone call   []LMOM  []L/M to call office as no active Communication consent on file  []Unable to leave detailed message as VM not approved on Communication consent       Pharmacy advised by    []Fax  [x]Phone call    Approval letter scanned into Media Yes

## 2024-09-13 NOTE — TELEPHONE ENCOUNTER
PA for Zepbound 7.5 mg SUBMITTED     via    [x]CMM-KEY: [id number]  [][employer]-Case ID #    []Faxed to plan   []Other website    []Phone call Case ID #      Office notes sent, clinical questions answered. Awaiting determination    Turnaround time for your insurance to make a decision on your Prior Authorization can take 7-21 business days.

## 2024-09-16 ENCOUNTER — APPOINTMENT (OUTPATIENT)
Dept: LAB | Facility: CLINIC | Age: 29
End: 2024-09-16
Payer: COMMERCIAL

## 2024-09-16 DIAGNOSIS — G47.30 SLEEP APNEA, UNSPECIFIED TYPE: ICD-10-CM

## 2024-09-16 DIAGNOSIS — Z87.891 HISTORY OF TOBACCO USE: ICD-10-CM

## 2024-09-16 DIAGNOSIS — F17.211 CIGARETTE NICOTINE DEPENDENCE IN REMISSION: ICD-10-CM

## 2024-09-16 DIAGNOSIS — D72.829 LEUKOCYTOSIS, UNSPECIFIED TYPE: ICD-10-CM

## 2024-09-16 DIAGNOSIS — E66.01 MORBID OBESITY WITH BMI OF 60.0-69.9, ADULT (HCC): ICD-10-CM

## 2024-09-16 DIAGNOSIS — Z01.818 PRE-OPERATIVE CLEARANCE: ICD-10-CM

## 2024-09-16 DIAGNOSIS — I10 PRIMARY HYPERTENSION: ICD-10-CM

## 2024-09-16 DIAGNOSIS — R79.89 ELEVATED LFTS: ICD-10-CM

## 2024-09-16 LAB
ALBUMIN SERPL BCG-MCNC: 4.4 G/DL (ref 3.5–5)
ALP SERPL-CCNC: 99 U/L (ref 34–104)
ALT SERPL W P-5'-P-CCNC: 44 U/L (ref 7–52)
ANION GAP SERPL CALCULATED.3IONS-SCNC: 11 MMOL/L (ref 4–13)
AST SERPL W P-5'-P-CCNC: 26 U/L (ref 13–39)
BASOPHILS # BLD AUTO: 0.03 THOUSANDS/ΜL (ref 0–0.1)
BASOPHILS NFR BLD AUTO: 0 % (ref 0–1)
BILIRUB SERPL-MCNC: 0.53 MG/DL (ref 0.2–1)
BUN SERPL-MCNC: 13 MG/DL (ref 5–25)
CALCIUM SERPL-MCNC: 9.5 MG/DL (ref 8.4–10.2)
CHLORIDE SERPL-SCNC: 104 MMOL/L (ref 96–108)
CO2 SERPL-SCNC: 26 MMOL/L (ref 21–32)
CREAT SERPL-MCNC: 0.74 MG/DL (ref 0.6–1.3)
EOSINOPHIL # BLD AUTO: 0.13 THOUSAND/ΜL (ref 0–0.61)
EOSINOPHIL NFR BLD AUTO: 1 % (ref 0–6)
ERYTHROCYTE [DISTWIDTH] IN BLOOD BY AUTOMATED COUNT: 13.1 % (ref 11.6–15.1)
FERRITIN SERPL-MCNC: 161 NG/ML (ref 24–336)
GFR SERPL CREATININE-BSD FRML MDRD: 124 ML/MIN/1.73SQ M
GLUCOSE P FAST SERPL-MCNC: 101 MG/DL (ref 65–99)
HAV IGM SER QL: NORMAL
HBV CORE IGM SER QL: NORMAL
HBV SURFACE AG SER QL: NORMAL
HCT VFR BLD AUTO: 44.4 % (ref 36.5–49.3)
HCV AB SER QL: NORMAL
HGB BLD-MCNC: 14.1 G/DL (ref 12–17)
IMM GRANULOCYTES # BLD AUTO: 0.04 THOUSAND/UL (ref 0–0.2)
IMM GRANULOCYTES NFR BLD AUTO: 0 % (ref 0–2)
IRON SATN MFR SERPL: 20 % (ref 15–50)
IRON SERPL-MCNC: 65 UG/DL (ref 50–212)
LYMPHOCYTES # BLD AUTO: 2.77 THOUSANDS/ΜL (ref 0.6–4.47)
LYMPHOCYTES NFR BLD AUTO: 25 % (ref 14–44)
MCH RBC QN AUTO: 28.8 PG (ref 26.8–34.3)
MCHC RBC AUTO-ENTMCNC: 31.8 G/DL (ref 31.4–37.4)
MCV RBC AUTO: 91 FL (ref 82–98)
MONOCYTES # BLD AUTO: 0.57 THOUSAND/ΜL (ref 0.17–1.22)
MONOCYTES NFR BLD AUTO: 5 % (ref 4–12)
NEUTROPHILS # BLD AUTO: 7.59 THOUSANDS/ΜL (ref 1.85–7.62)
NEUTS SEG NFR BLD AUTO: 69 % (ref 43–75)
NRBC BLD AUTO-RTO: 0 /100 WBCS
PLATELET # BLD AUTO: 359 THOUSANDS/UL (ref 149–390)
PMV BLD AUTO: 10.7 FL (ref 8.9–12.7)
POTASSIUM SERPL-SCNC: 3.9 MMOL/L (ref 3.5–5.3)
PROT SERPL-MCNC: 7.9 G/DL (ref 6.4–8.4)
RBC # BLD AUTO: 4.89 MILLION/UL (ref 3.88–5.62)
SODIUM SERPL-SCNC: 141 MMOL/L (ref 135–147)
TIBC SERPL-MCNC: 326 UG/DL (ref 250–450)
UIBC SERPL-MCNC: 261 UG/DL (ref 155–355)
WBC # BLD AUTO: 11.13 THOUSAND/UL (ref 4.31–10.16)

## 2024-09-16 PROCEDURE — 80323 ALKALOIDS NOS: CPT

## 2024-09-16 PROCEDURE — 83540 ASSAY OF IRON: CPT

## 2024-09-16 PROCEDURE — 80053 COMPREHEN METABOLIC PANEL: CPT

## 2024-09-16 PROCEDURE — 85025 COMPLETE CBC W/AUTO DIFF WBC: CPT

## 2024-09-16 PROCEDURE — 80074 ACUTE HEPATITIS PANEL: CPT

## 2024-09-16 PROCEDURE — 83550 IRON BINDING TEST: CPT

## 2024-09-16 PROCEDURE — 36415 COLL VENOUS BLD VENIPUNCTURE: CPT

## 2024-09-16 PROCEDURE — 82728 ASSAY OF FERRITIN: CPT

## 2024-09-23 LAB
COTININE SERPL-MCNC: <1 NG/ML
NICOTINE SERPL-MCNC: <1 NG/ML

## 2024-10-09 DIAGNOSIS — E66.01 CLASS 3 SEVERE OBESITY DUE TO EXCESS CALORIES WITHOUT SERIOUS COMORBIDITY WITH BODY MASS INDEX (BMI) OF 60.0 TO 69.9 IN ADULT (HCC): Primary | ICD-10-CM

## 2024-10-09 DIAGNOSIS — E66.01 CLASS 3 SEVERE OBESITY DUE TO EXCESS CALORIES WITHOUT SERIOUS COMORBIDITY WITH BODY MASS INDEX (BMI) OF 60.0 TO 69.9 IN ADULT (HCC): ICD-10-CM

## 2024-10-09 DIAGNOSIS — E66.813 CLASS 3 SEVERE OBESITY DUE TO EXCESS CALORIES WITHOUT SERIOUS COMORBIDITY WITH BODY MASS INDEX (BMI) OF 60.0 TO 69.9 IN ADULT (HCC): Primary | ICD-10-CM

## 2024-10-09 DIAGNOSIS — E66.813 CLASS 3 SEVERE OBESITY DUE TO EXCESS CALORIES WITHOUT SERIOUS COMORBIDITY WITH BODY MASS INDEX (BMI) OF 60.0 TO 69.9 IN ADULT (HCC): ICD-10-CM

## 2024-10-09 RX ORDER — TIRZEPATIDE 7.5 MG/.5ML
7.5 INJECTION, SOLUTION SUBCUTANEOUS WEEKLY
Qty: 2 ML | Refills: 1 | OUTPATIENT
Start: 2024-10-09

## 2024-10-09 RX ORDER — TIRZEPATIDE 10 MG/.5ML
10 INJECTION, SOLUTION SUBCUTANEOUS WEEKLY
Qty: 2 ML | Refills: 2 | Status: SHIPPED | OUTPATIENT
Start: 2024-10-09 | End: 2024-12-04

## 2024-10-09 NOTE — TELEPHONE ENCOUNTER
How are you tolerating the medication?   [] Nausea  [] Vomiting  [] Diarrhea  [x] Asymptomatic  [] Other:    Would you like an increase in your dose?  [x] Yes   [] No

## 2024-10-16 NOTE — PROGRESS NOTES
Behavioral Health Follow Up Note:      5 / 6  Weight Check:  Surgeon: Dr. Raul Knutson (told him to lose 46 lbs prior to the operation)  MWM-Zepbound    Starting weight 469 #  Today's weight 428.2 #    Surgery month:  DEC/ JAN  Surgery deadline: MARCH    Mental health and wellness - Patient is appropriately making changes based off the information contained in the bariatric manual.  Patient is modifying behaviors and demonstrating adapting to change.   Josep does not endorse any anxiety, depression or mental health struggles at this time. Reports occasional urges for nicotine but continues to be nicotine free.     Eating behaviors - three meals per day: rice & chicken, fruit, minimal vegetables. Water-four 16oz bottles daily. No soda, Gatorade zero. Eating take out less. Less snacking.     Activity -  walking daily in small increments.     Progress toward program requirements    Workflow:  Psych and/or D+A additional documentation  n/a  PCP Letter: 4/3/2024  Support Group: RECOMMENDED  Surgeon Appt.: 4/12/2024  EGD : needs-to be scheduled today  Cardiac Risk Assessment: needs 12/5/2024  Sleep Studies: STACI with CPAP  Bloodwork: 9/16/24  Hepatology: needs 10/30/24 (ELEVATED LFTS, ULTRASOUND ORDERED BY PCP)   Negative nicotine: 9/16/24     Next appt weight check 6 / 6 scheduled with RD 11/21/24    Goals: start counting calories again using baritastic erwin, be mindful of protein goal. Continue following the bariatric recommendations to prepare for bariatric surgery

## 2024-10-17 ENCOUNTER — CLINICAL SUPPORT (OUTPATIENT)
Dept: BARIATRICS | Facility: CLINIC | Age: 29
End: 2024-10-17

## 2024-10-17 ENCOUNTER — PREP FOR PROCEDURE (OUTPATIENT)
Dept: BARIATRICS | Facility: CLINIC | Age: 29
End: 2024-10-17

## 2024-10-17 VITALS — BODY MASS INDEX: 56.49 KG/M2 | WEIGHT: 315 LBS

## 2024-10-17 DIAGNOSIS — Z98.84 BARIATRIC SURGERY STATUS: Primary | ICD-10-CM

## 2024-10-17 DIAGNOSIS — E66.9 OBESITY, UNSPECIFIED: Primary | ICD-10-CM

## 2024-10-17 PROCEDURE — RECHECK

## 2024-10-30 ENCOUNTER — OFFICE VISIT (OUTPATIENT)
Age: 29
End: 2024-10-30
Payer: COMMERCIAL

## 2024-10-30 VITALS
HEART RATE: 97 BPM | DIASTOLIC BLOOD PRESSURE: 80 MMHG | TEMPERATURE: 97.3 F | WEIGHT: 315 LBS | BODY MASS INDEX: 41.75 KG/M2 | HEIGHT: 73 IN | SYSTOLIC BLOOD PRESSURE: 140 MMHG | OXYGEN SATURATION: 97 %

## 2024-10-30 DIAGNOSIS — Z01.818 PRE-OPERATIVE CLEARANCE: ICD-10-CM

## 2024-10-30 DIAGNOSIS — E66.01 MORBID OBESITY WITH BMI OF 60.0-69.9, ADULT (HCC): ICD-10-CM

## 2024-10-30 DIAGNOSIS — R74.8 ELEVATED LIVER ENZYMES: ICD-10-CM

## 2024-10-30 PROCEDURE — 99243 OFF/OP CNSLTJ NEW/EST LOW 30: CPT | Performed by: FAMILY MEDICINE

## 2024-10-30 NOTE — PROGRESS NOTES
[facility] - Outpatient Consultation  Josep Cooper 29 y.o. male MRN: [id number]  Encounter: [id number]          ASSESSMENT AND PLAN:      1. Pre-operative clearance  2. Morbid obesity with BMI of 60.0-69.9, adult (HCC)  3. Elevated liver enzymes  Patient referred to hepatology for preoperative clearance in anticipation of a sleeve gastrectomy and evaluation of elevated liver enzymes. He has single episode of mildly elevated transaminases in December 2023 but his recent serology showed normal liver chemistries. Acute hepatitis panel and iron studies negative/normal. No clinical evidence of chronic liver disease on exam today.    Suspect patient's previously abnormal liver tests were secondary to MASLD. He does not have any prior imaging available for review but has multiple metabolic risk factors for the development of fatty liver disease.  Plan for an RUQ US to confirm the presence of hepatic steatosis. If confirmed on imaging, will order him for a US elastography in order to assess for advanced hepatic fibrosis. Will defer a secondary serologic workup given his normal liver tests.    He will continue following with weight management and bariatric surgery for assistance with weight loss. Also recommend optimization of his metabolic risk factors and limiting his EtOH use. If he has fatty liver disease, and is also found to have advanced hepatic fibrosis on elastography, then discussed the possibility of coordinating an intraoperative liver biopsy for definitive staging of his fibrosis.     - Ambulatory Referral to Hepatology  - US right upper quadrant; Future      Follow-up PRN pending results.     ______________________________________________________________________    HPI: Patient is a 29 y.o. male with PMH significant for morbid obesity, HTN, STACI, insomnia and a history of tobacco use who presents today for a preoperative examination prior to bariatric surgery and  elevated liver enzymes.    Josep is following with bariatric surgery and considering a sleeve gastrectomy. He was referred to hepatology for a preoperative examination and evaluation of elevated liver enzymes. Per chart review, he had a single episode of mildly elevated serum transaminases in December 2023. His most recent labs from September 2024 showed normal liver chemistries.  He has had an acute hepatitis panel and iron studies which were negative. Serologies also show preserved platelet count. No prior imaging available for review.     Denies a family history of liver disease or liver related cancers. He drinks alcohol on average 1-2 times monthly and denies drinking in excess.    He is also utilizing Zepbound and reports an approximate 45 lb weight loss.      REVIEW OF SYSTEMS:    CONSTITUTIONAL: Denies any fever, chills, rigors, and weight loss.  HEENT: No earache or tinnitus. Denies hearing loss or visual disturbances.  CARDIOVASCULAR: No chest pain or palpitations.   RESPIRATORY: Denies any cough, hemoptysis, shortness of breath or dyspnea on exertion.  GASTROINTESTINAL: As noted in the History of Present Illness.   GENITOURINARY: No problems with urination. Denies any hematuria or dysuria.  NEUROLOGIC: No dizziness or vertigo, denies headaches.   MUSCULOSKELETAL: Denies any muscle or joint pain.   SKIN: Denies skin rashes or itching.   ENDOCRINE: Denies excessive thirst. Denies intolerance to heat or cold.  PSYCHOSOCIAL: Denies depression or anxiety. Denies any recent memory loss.       Historical Information   Past Medical History:   Diagnosis Date    Concussion with loss of consciousness 04/18/2017    Last Assessment & Plan: Formatting of this note might be different from the original. Requesting records from [facility]    Hypertension      History reviewed. No pertinent surgical history.  Social History   Social History     Substance and Sexual Activity   Alcohol Use Never     Social  "History     Substance and Sexual Activity   Drug Use Never     Social History     Tobacco Use   Smoking Status Former    Current packs/day: 0.00    Average packs/day: 0.3 packs/day for 11.7 years (2.9 ttl pk-yrs)    Types: Cigarettes    Start date:     Quit date: 2023    Years since quittin.1   Smokeless Tobacco Never     Family History   Problem Relation Age of Onset    Diabetes Paternal Grandfather        Meds/Allergies       Current Outpatient Medications:     tirzepatide (Zepbound) 10 mg/0.5 mL auto-injector    lisinopril (ZESTRIL) 10 mg tablet    Allergies   Allergen Reactions    Tuberculin Ppd Other (See Comments)     Other reaction(s): Positive skin tests in past           Objective     Blood pressure 140/80, pulse 97, temperature (!) 97.3 °F (36.3 °C), temperature source Tympanic, height 6' 1\" (1.854 m), weight (!) 193 kg (426 lb), SpO2 97%. Body mass index is 56.2 kg/m².        PHYSICAL EXAM:      General Appearance:   Alert, cooperative, no distress   HEENT:   Normocephalic, atraumatic, anicteric.     Neck:  Supple, symmetrical, trachea midline   Lungs:   Clear to auscultation bilaterally; no rales, rhonchi or wheezing; respirations unlabored    Heart::   Regular rate and rhythm; no murmur, rub, or gallop.   Abdomen:   Soft, non-tender, non-distended; normal bowel sounds; no masses, no organomegaly    Genitalia:   Deferred    Rectal:   Deferred    Extremities:  No cyanosis, clubbing or edema    Pulses:  2+ and symmetric    Skin:  No jaundice, rashes, or lesions    Lymph nodes:  No palpable cervical lymphadenopathy        Lab Results:   No visits with results within 1 Day(s) from this visit.   Latest known visit with results is:   Lab on 2024   Component Date Value    Nicotine 2024 <1.0     Cotinine 2024 <1.0     Hepatitis B Surface Ag 2024 Non-reactive     Hep A IgM 2024 Non-reactive     Hepatitis C Ab 2024 Non-reactive     Hep B C IgM 2024 Non-reactive  "    Sodium 09/16/2024 141     Potassium 09/16/2024 3.9     Chloride 09/16/2024 104     CO2 09/16/2024 26     ANION GAP 09/16/2024 11     BUN 09/16/2024 13     Creatinine 09/16/2024 0.74     Glucose, Fasting 09/16/2024 101 (H)     Calcium 09/16/2024 9.5     AST 09/16/2024 26     ALT 09/16/2024 44     Alkaline Phosphatase 09/16/2024 99     Total Protein 09/16/2024 7.9     Albumin 09/16/2024 4.4     Total Bilirubin 09/16/2024 0.53     eGFR 09/16/2024 124     WBC 09/16/2024 11.13 (H)     RBC 09/16/2024 4.89     Hemoglobin 09/16/2024 14.1     Hematocrit 09/16/2024 44.4     MCV 09/16/2024 91     MCH 09/16/2024 28.8     MCHC 09/16/2024 31.8     RDW 09/16/2024 13.1     MPV 09/16/2024 10.7     Platelets 09/16/2024 359     nRBC 09/16/2024 0     Segmented % 09/16/2024 69     Immature Grans % 09/16/2024 0     Lymphocytes % 09/16/2024 25     Monocytes % 09/16/2024 5     Eosinophils Relative 09/16/2024 1     Basophils Relative 09/16/2024 0     Absolute Neutrophils 09/16/2024 7.59     Absolute Immature Grans 09/16/2024 0.04     Absolute Lymphocytes 09/16/2024 2.77     Absolute Monocytes 09/16/2024 0.57     Eosinophils Absolute 09/16/2024 0.13     Basophils Absolute 09/16/2024 0.03     Iron Saturation 09/16/2024 20     TIBC 09/16/2024 326     Iron 09/16/2024 65     UIBC 09/16/2024 261     Ferritin 09/16/2024 161          Radiology Results:   No results found.    Mary Mello PA-C     **Please note: Dictation voice to text software may have been used in the creation of this record. Occasional wrong word or “sound alike” substitutions may have occurred due to the inherent limitations of voice recognition software. Read the chart carefully and recognize, using context, where substitutions have occurred.**

## 2024-11-05 ENCOUNTER — TELEPHONE (OUTPATIENT)
Dept: BARIATRICS | Facility: CLINIC | Age: 29
End: 2024-11-05

## 2024-11-05 NOTE — TELEPHONE ENCOUNTER
Spoke to pt ensured they knew and confirmed EGD was scheduled for next week (11/13).  Zepbound    Patient requested refill on prescription.  After review called patient to inform original prescription of 10/9 had 2 refills and patient should contact his pharmacy, but to call the office if any problems.

## 2024-11-07 ENCOUNTER — OFFICE VISIT (OUTPATIENT)
Age: 29
End: 2024-11-07
Payer: COMMERCIAL

## 2024-11-07 VITALS
HEART RATE: 87 BPM | WEIGHT: 315 LBS | BODY MASS INDEX: 40.43 KG/M2 | OXYGEN SATURATION: 96 % | DIASTOLIC BLOOD PRESSURE: 100 MMHG | HEIGHT: 74 IN | SYSTOLIC BLOOD PRESSURE: 160 MMHG

## 2024-11-07 DIAGNOSIS — E66.01 CLASS 3 SEVERE OBESITY DUE TO EXCESS CALORIES WITHOUT SERIOUS COMORBIDITY WITH BODY MASS INDEX (BMI) OF 50.0 TO 59.9 IN ADULT (HCC): ICD-10-CM

## 2024-11-07 DIAGNOSIS — I10 PRIMARY HYPERTENSION: ICD-10-CM

## 2024-11-07 DIAGNOSIS — G47.33 OSA (OBSTRUCTIVE SLEEP APNEA): Primary | ICD-10-CM

## 2024-11-07 DIAGNOSIS — E66.813 CLASS 3 SEVERE OBESITY DUE TO EXCESS CALORIES WITHOUT SERIOUS COMORBIDITY WITH BODY MASS INDEX (BMI) OF 50.0 TO 59.9 IN ADULT (HCC): ICD-10-CM

## 2024-11-07 PROCEDURE — 99214 OFFICE O/P EST MOD 30 MIN: CPT | Performed by: INTERNAL MEDICINE

## 2024-11-07 NOTE — PROGRESS NOTES
Progress Note - Sleep Medicine  Josep Cooper 29 y.o. male MRN: [id number]       Impression & Plan:       Moderate obstructive sleep apnea  Body mass index is 62.62 kg/m²., Neck Circumference: 20.      Initial S/s: Snoring, choking, gagging, witnessed apneas, excessive tiredness and daytime sleepiness, [place] score: 10    Using F20    Compliance from 10/5 - 11/3  10% more than 4 hours, average usage 3 hours 40 minutes  On APAP 5-20  95 percentile pressure 6.2  Median leak 0.9  Residual AHI 0.1    Patient has poor compliance with CPAP  He states that he is fine but falls asleep and forgets to put it on    Plan  Counseled patient on improving compliance  Follow-up in 3 months    2.  Chronic insomnia  He slept only 30 minutes last night  He has sleep onset and maintenance insomnia  Counseled patient on sleep hygiene measures    Plan  Will monitor after he starts using CPAP    3.  Obesity  Counseled patient on lifestyle modifications including diet and exercise  I explained that weight loss will decrease severity of sleep apnea    3.  Hypertension  Blood pressure was highly elevated today  He states the blood pressure is generally high whenever he drinks a red bull but is normal at home  Currently on lisinopril 10 mg daily  Recommended he discuss with his primary care physician about elevated blood pressure reading  Patient denies being symptomatic  He has not been taking lisinopril            ______________________________________________________________________    HPI:    Josep Cooper is a 29 y.o. male with PMHx Past medical history of morbid obesity, hypertension, current tobacco use who presents for follow-up of moderate obstructive sleep apnea.    Last office visit  He reports loud snoring, choking, gagging, and witnessed apneas.  He has difficulty falling and staying asleep.  He has frequent nighttime awakenings.  His sleep schedule is highly variable.  He states that he only slept 30 minutes last night.  He  works second shift from 2 PM to 10 PM.  He has an Pandora score of 10.  He drinks energy drinks approximately 24 ounces.  He quit smoking 2 months ago.  He occasionally has alcohol.    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    Current office visit  Diagnostic PSG showed an AHI of 16.  Patient has poor compliance with CPAP.  However he states that he is not having symptoms and doing fine.  He forgets to put the mask on and goes to bed.        Review of Systems:  Review of Systems   All other systems reviewed and are negative.        Social history updates:  Social History     Tobacco Use   Smoking Status Former    Current packs/day: 0.00    Average packs/day: 0.3 packs/day for 11.7 years (2.9 ttl pk-yrs)    Types: Cigarettes    Start date:     Quit date: 2023    Years since quittin.1   Smokeless Tobacco Never     Social History     Socioeconomic History    Marital status: Single     Spouse name: Not on file    Number of children: 0    Years of education: Not on file    Highest education level: Not on file   Occupational History    Not on file   Tobacco Use    Smoking status: Former     Current packs/day: 0.00     Average packs/day: 0.3 packs/day for 11.7 years (2.9 ttl pk-yrs)     Types: Cigarettes     Start date:      Quit date: 2023     Years since quittin.1    Smokeless tobacco: Never   Vaping Use    Vaping status: Former   Substance and Sexual Activity    Alcohol use: Never    Drug use: Never    Sexual activity: Not on file   Other Topics Concern    Not on file   Social History Narrative    Not on file     Social Determinants of Health     Financial Resource Strain: Low Risk  (10/26/2023)    Overall Financial Resource Strain (CARDIA)     Difficulty of Paying Living Expenses: Not hard at all   Food Insecurity: No Food Insecurity (10/26/2023)    Nursing - Inadequate Food Risk Classification     Worried About Running Out of Food in the Last Year: Never true     Ran Out of Food in the Last  "Year: Never true     Ran Out of Food in the Last Year: Not on file   Transportation Needs: No Transportation Needs (10/26/2023)    PRAPARE - Transportation     Lack of Transportation (Medical): No     Lack of Transportation (Non-Medical): No   Physical Activity: Not on file   Stress: Not on file   Social Connections: Not on file   Intimate Partner Violence: Not on file   Housing Stability: Low Risk  (4/3/2024)    Housing Stability Vital Sign     Unable to Pay for Housing in the Last Year: No     Number of Times Moved in the Last Year: 1     Homeless in the Last Year: No       PhysicalExamination:  Vitals:   /100 (BP Location: Left arm, Patient Position: Sitting, Cuff Size: Large)   Pulse 87   Ht 6' 2\" (1.88 m)   Wt (!) 193 kg (426 lb 3.2 oz)   SpO2 96%   BMI 54.72 kg/m²     Physical Exam  General:  Awake alert and oriented x 3, conversant without conversational dyspnea, NAD, normal affect  HEENT:  PERRL, Sclera noninjected, nonicteric OU, Nares patent,  no craniofacial abnormalities, Mucous membranes, moist, no oral lesions, normal dentition  NECK: Trachea midline, no accessory muscle use, no stridor, no cervical or supraclavicular adenopathy, JVP not elevated  CARDIAC: Reg, single s1/S2, no m/r/g  PULM: CTA bilaterally no wheezing, rhonchi or rales  EXT: No cyanosis, no clubbing, no edema, normal capillary refill  NEURO: no focal neurologic deficits, AAOx3, moving all extremities appropriately     Diagnostic Data:  Labs:  I personally reviewed the most recent laboratory data pertinent to today's visit  Lab on 09/16/2024   Component Date Value    Nicotine 09/16/2024 <1.0     Cotinine 09/16/2024 <1.0     Hepatitis B Surface Ag 09/16/2024 Non-reactive     Hep A IgM 09/16/2024 Non-reactive     Hepatitis C Ab 09/16/2024 Non-reactive     Hep B C IgM 09/16/2024 Non-reactive     Sodium 09/16/2024 141     Potassium 09/16/2024 3.9     Chloride 09/16/2024 104     CO2 09/16/2024 26     ANION GAP 09/16/2024 11     " BUN 09/16/2024 13     Creatinine 09/16/2024 0.74     Glucose, Fasting 09/16/2024 101 (H)     Calcium 09/16/2024 9.5     AST 09/16/2024 26     ALT 09/16/2024 44     Alkaline Phosphatase 09/16/2024 99     Total Protein 09/16/2024 7.9     Albumin 09/16/2024 4.4     Total Bilirubin 09/16/2024 0.53     eGFR 09/16/2024 124     WBC 09/16/2024 11.13 (H)     RBC 09/16/2024 4.89     Hemoglobin 09/16/2024 14.1     Hematocrit 09/16/2024 44.4     MCV 09/16/2024 91     MCH 09/16/2024 28.8     MCHC 09/16/2024 31.8     RDW 09/16/2024 13.1     MPV 09/16/2024 10.7     Platelets 09/16/2024 359     nRBC 09/16/2024 0     Segmented % 09/16/2024 69     Immature Grans % 09/16/2024 0     Lymphocytes % 09/16/2024 25     Monocytes % 09/16/2024 5     Eosinophils Relative 09/16/2024 1     Basophils Relative 09/16/2024 0     Absolute Neutrophils 09/16/2024 7.59     Absolute Immature Grans 09/16/2024 0.04     Absolute Lymphocytes 09/16/2024 2.77     Absolute Monocytes 09/16/2024 0.57     Eosinophils Absolute 09/16/2024 0.13     Basophils Absolute 09/16/2024 0.03     Iron Saturation 09/16/2024 20     TIBC 09/16/2024 326     Iron 09/16/2024 65     UIBC 09/16/2024 261     Ferritin 09/16/2024 161    Telephone on 08/03/2024   Component Date Value    Supplier Name 08/03/2024 [employer]     Supplier Phone Number 08/03/2024 [phone]     Order Status 08/03/2024 Delivery Successful     Delivery Request Date 08/03/2024 08/03/2024     Date Delivered  08/03/2024 08/12/2024     Supplier Name 08/03/2024 08/12/2024     Item Description 08/03/2024 CPAP Machine, Resmed     Item Description 08/03/2024 PAP Mask, Nasal, Generic, Fit Upon Setup, 1 per 3 months     Item Description 08/03/2024 PAP Headgear, 1 per 6 months     Item Description 08/03/2024 PAP Humidifier, Heated     Item Description 08/03/2024 PAP Mask Interface Cushion, Nasal, 2 per 1 month     Item Description 08/03/2024 Disposable PAP Filter, 2 per 1 month     Item  "Description 08/03/2024 Non-Disposable PAP Filter, 1 per 6 months     Item Description 08/03/2024 PAP Machine Tubing, Heated, 1 per 3 months     Item Description 08/03/2024 Humidifier Water Chamber, 1 per 6 months     Item Description 08/03/2024 PAP Monitoring Modem        I have personally reviewed pertinent lab results.  Lab Results   Component Value Date    WBC 11.13 (H) 09/16/2024    HGB 14.1 09/16/2024    HCT 44.4 09/16/2024    MCV 91 09/16/2024     09/16/2024     Lab Results   Component Value Date    CALCIUM 9.5 09/16/2024    K 3.9 09/16/2024    CO2 26 09/16/2024     09/16/2024    BUN 13 09/16/2024    CREATININE 0.74 09/16/2024     No results found for: \"IGE\"  Lab Results   Component Value Date    ALT 44 09/16/2024    AST 26 09/16/2024    ALKPHOS 99 09/16/2024     Lab Results   Component Value Date    IRON 65 09/16/2024    TIBC 326 09/16/2024    FERRITIN 161 09/16/2024     No results found for: \"SUNBUWDR95\"  No results found for: \"FOLATE\"      Arterial Blood Gas result:  TERRELL Steinberg MD  [facility] Sleep Medicine    "

## 2024-11-11 RX ORDER — SODIUM CHLORIDE 9 MG/ML
125 INJECTION, SOLUTION INTRAVENOUS CONTINUOUS
Status: CANCELLED | OUTPATIENT
Start: 2024-11-11

## 2024-11-13 ENCOUNTER — ANESTHESIA (OUTPATIENT)
Dept: GASTROENTEROLOGY | Facility: HOSPITAL | Age: 29
End: 2024-11-13
Payer: COMMERCIAL

## 2024-11-13 ENCOUNTER — HOSPITAL ENCOUNTER (OUTPATIENT)
Dept: GASTROENTEROLOGY | Facility: HOSPITAL | Age: 29
Setting detail: OUTPATIENT SURGERY
Discharge: HOME | End: 2024-11-13
Attending: SURGERY
Payer: COMMERCIAL

## 2024-11-13 ENCOUNTER — ANESTHESIA EVENT (OUTPATIENT)
Dept: GASTROENTEROLOGY | Facility: HOSPITAL | Age: 29
End: 2024-11-13
Payer: COMMERCIAL

## 2024-11-13 VITALS
HEIGHT: 74 IN | SYSTOLIC BLOOD PRESSURE: 129 MMHG | HEART RATE: 82 BPM | RESPIRATION RATE: 20 BRPM | BODY MASS INDEX: 40.43 KG/M2 | DIASTOLIC BLOOD PRESSURE: 82 MMHG | OXYGEN SATURATION: 97 % | TEMPERATURE: 97.3 F | WEIGHT: 315 LBS

## 2024-11-13 DIAGNOSIS — Z98.84 BARIATRIC SURGERY STATUS: ICD-10-CM

## 2024-11-13 PROCEDURE — 43239 EGD BIOPSY SINGLE/MULTIPLE: CPT | Performed by: SURGERY

## 2024-11-13 PROCEDURE — 88305 TISSUE EXAM BY PATHOLOGIST: CPT | Performed by: STUDENT IN AN ORGANIZED HEALTH CARE EDUCATION/TRAINING PROGRAM

## 2024-11-13 RX ORDER — LIDOCAINE HCL/PF 100 MG/5ML
SYRINGE (ML) INJECTION AS NEEDED
Status: DISCONTINUED | OUTPATIENT
Start: 2024-11-13 | End: 2024-11-13

## 2024-11-13 RX ORDER — SODIUM CHLORIDE 9 MG/ML
125 INJECTION, SOLUTION INTRAVENOUS CONTINUOUS
Status: DISCONTINUED | OUTPATIENT
Start: 2024-11-13 | End: 2024-11-17 | Stop reason: HOSPADM

## 2024-11-13 RX ORDER — PROPOFOL 10 MG/ML
INJECTION, EMULSION INTRAVENOUS AS NEEDED
Status: DISCONTINUED | OUTPATIENT
Start: 2024-11-13 | End: 2024-11-13

## 2024-11-13 RX ADMIN — LIDOCAINE HYDROCHLORIDE 100 MG: 20 INJECTION INTRAVENOUS at 14:17

## 2024-11-13 RX ADMIN — SODIUM CHLORIDE: 0.9 INJECTION, SOLUTION INTRAVENOUS at 14:13

## 2024-11-13 RX ADMIN — PROPOFOL 100 MG: 10 INJECTION, EMULSION INTRAVENOUS at 14:20

## 2024-11-13 RX ADMIN — PROPOFOL 200 MG: 10 INJECTION, EMULSION INTRAVENOUS at 14:17

## 2024-11-13 NOTE — ANESTHESIA PREPROCEDURE EVALUATION
Procedure:  EGD    Relevant Problems   CARDIO   (+) Primary hypertension      PULMONARY   (+) STACI (obstructive sleep apnea)      Behavioral Health   (+) History of tobacco use      Other   (+) Class 3 severe obesity due to excess calories with body mass index (BMI) of 60.0 to 69.9 in adult (HCC)        Physical Exam    Airway    Mallampati score: III         Dental   No notable dental hx     Cardiovascular  Cardiovascular exam normal    Pulmonary  Pulmonary exam normal     Other Findings        Anesthesia Plan  ASA Score- 3     Anesthesia Type- general with ASA Monitors.         Additional Monitors:     Airway Plan:            Plan Factors-Exercise tolerance (METS): >4 METS.    Chart reviewed.        Patient is not a current smoker.              Induction- intravenous.    Postoperative Plan-         Informed Consent- Anesthetic plan and risks discussed with patient.

## 2024-11-13 NOTE — ANESTHESIA POSTPROCEDURE EVALUATION
Post-Op Assessment Note    CV Status:  Stable    Pain management: adequate       Mental Status:  Alert     Post Op Vitals Reviewed: Yes    No anethesia notable event occurred.    Staff: CRNA           Last Filed PACU Vitals:  Vitals Value Taken Time   Temp     Pulse     BP     Resp     SpO2         Modified Yadira:  Activity: 2 (11/13/2024  1:50 PM)  Respiration: 2 (11/13/2024  1:50 PM)  Circulation: 2 (11/13/2024  1:50 PM)  Consciousness: 2 (11/13/2024  1:50 PM)  Oxygen Saturation: 2 (11/13/2024  1:50 PM)  Modified Yadira Score: 10 (11/13/2024  1:50 PM)

## 2024-11-13 NOTE — H&P
"This is a 29 y.o. male with a history of morbid obesity and Body mass index is 54.63 kg/m².  Here for an EGD to evaluate the anatomy of the GI tract.    Physical Exam    /72   Pulse 87   Temp 99 °F (37.2 °C) (Temporal)   Resp 16   Ht 6' 2\" (1.88 m)   Wt (!) 193 kg (425 lb 7.8 oz)   SpO2 99%   BMI 54.63 kg/m²    AAOx3  RRR  CTA B  Abdomen obese. Benign.      A/P:    This is a 29 y.o. male with a history of morbid obesity and Body mass index is 54.63 kg/m²..    Will proceed with the EGD and biopsies.      Raul Knutson MD  11/13/24  2:15 PM  "

## 2024-11-14 ENCOUNTER — HOSPITAL ENCOUNTER (OUTPATIENT)
Dept: ULTRASOUND IMAGING | Facility: HOSPITAL | Age: 29
Discharge: HOME | End: 2024-11-14
Payer: COMMERCIAL

## 2024-11-14 DIAGNOSIS — Z01.818 PRE-OPERATIVE CLEARANCE: ICD-10-CM

## 2024-11-14 DIAGNOSIS — R74.8 ELEVATED LIVER ENZYMES: ICD-10-CM

## 2024-11-14 DIAGNOSIS — E66.01 MORBID OBESITY WITH BMI OF 60.0-69.9, ADULT (HCC): ICD-10-CM

## 2024-11-14 PROCEDURE — 76705 ECHO EXAM OF ABDOMEN: CPT

## 2024-11-18 ENCOUNTER — TELEPHONE (OUTPATIENT)
Age: 29
End: 2024-11-18

## 2024-11-18 PROCEDURE — 88305 TISSUE EXAM BY PATHOLOGIST: CPT | Performed by: STUDENT IN AN ORGANIZED HEALTH CARE EDUCATION/TRAINING PROGRAM

## 2024-11-18 NOTE — TELEPHONE ENCOUNTER
Pt called and I could barely understand what he was saying. Pt was asked the name of the medication he needed refill for and he stated he was not sure and he disconnected the call.

## 2024-11-20 ENCOUNTER — TELEPHONE (OUTPATIENT)
Dept: FAMILY MEDICINE CLINIC | Facility: CLINIC | Age: 29
End: 2024-11-20

## 2024-11-20 NOTE — TELEPHONE ENCOUNTER
first attempt to contact patient. left message to return my call on answering machine      Please assist with scheduling a f/u HTN

## 2024-11-21 ENCOUNTER — CLINICAL SUPPORT (OUTPATIENT)
Dept: BARIATRICS | Facility: CLINIC | Age: 29
End: 2024-11-21

## 2024-11-21 VITALS — BODY MASS INDEX: 54.54 KG/M2 | WEIGHT: 315 LBS

## 2024-11-21 DIAGNOSIS — E66.01 MORBID OBESITY WITH BMI OF 60.0-69.9, ADULT (HCC): ICD-10-CM

## 2024-11-21 DIAGNOSIS — R63.5 ABNORMAL WEIGHT GAIN: ICD-10-CM

## 2024-11-21 DIAGNOSIS — Z01.818 PRE-OPERATIVE CLEARANCE: Primary | ICD-10-CM

## 2024-11-21 DIAGNOSIS — G47.30 SLEEP APNEA, UNSPECIFIED TYPE: ICD-10-CM

## 2024-11-21 DIAGNOSIS — I10 PRIMARY HYPERTENSION: ICD-10-CM

## 2024-11-21 PROCEDURE — RECHECK: Performed by: DIETITIAN, REGISTERED

## 2024-11-21 NOTE — PROGRESS NOTES
Bariatric Nutrition Assessment Note    Type of surgery    Preop- interested in the sleeve gastrectomy   Surgery Date: TBD- 6 month program requirement   Today is 6/6 of program requirement   Surgeon: Dr. Raul Knutson  Per surgeon note :     I have told him to lose 46 lbs prior to the operation.  His BMI is 60 and we need to help him lose weight to make sure that his surgery is safer.   Pt has met his pre op weight loss goal and has lost 49.8 lbs     Nutrition Assessment   Josep Cooper  29 y.o.  male     Wt with BMI of 25: 194.8  Pre-Op Excess Wt: 274.2 lbs     Wt (!) 193 kg (424 lb 12.8 oz)   BMI 54.54 kg/m²    --49.8# x 6 months (10% of body weight )   Pt had to stop his zepbound prior to egd on 11/13/2024 .  .  Needs new prescription - message sent to provider.       Wt Readings from Last 3 Encounters:   11/21/24 (!) 193 kg (424 lb 12.8 oz)   11/13/24 (!) 193 kg (425 lb 7.8 oz)   11/07/24 (!) 193 kg (426 lb 3.2 oz)        [place]- [place] Equation:    [id number]=8126  Weight Maintenance 3593  Estimated calories for weight loss 2011-4677 ( 1-2# per wk wt loss - sedentary )  Estimated protein needs 88.5-133 grams (1.0-1.5 gms/kg IBW )   Estimated fluid needs 88.5-103 oz per day (30-35 ml/kg IBW )      Weight History   Onset of Obesity: Childhood  Family history of obesity: No  Wt Loss Attempts: Exercise  Self Created Diets (Portion Control, Healthy Food Choices, etc.)  Patient has tried the above for 6 months or more with insufficient weight loss or weight regain, which is why patient has requested to be evaluated for weight loss surgery today  Maximum Wt Lost: 60 lbs with diet and exercise       Review of History and Medications   Past Medical History:   Diagnosis Date    Concussion with loss of consciousness 04/18/2017    Last Assessment & Plan: Formatting of this note might be different from the original. Requesting records from [facility]    Hypertension      No past surgical history on file.  Social  History     Socioeconomic History    Marital status: Single     Spouse name: Not on file    Number of children: 0    Years of education: Not on file    Highest education level: Not on file   Occupational History    Not on file   Tobacco Use    Smoking status: Former     Current packs/day: 0.00     Average packs/day: 0.3 packs/day for 11.7 years (2.9 ttl pk-yrs)     Types: Cigarettes     Start date:      Quit date: 2023     Years since quittin.2    Smokeless tobacco: Never   Vaping Use    Vaping status: Former   Substance and Sexual Activity    Alcohol use: Never    Drug use: Never    Sexual activity: Not on file   Other Topics Concern    Not on file   Social History Narrative    Not on file     Social Drivers of Health     Financial Resource Strain: Low Risk  (10/26/2023)    Overall Financial Resource Strain (CARDIA)     Difficulty of Paying Living Expenses: Not hard at all   Food Insecurity: No Food Insecurity (10/26/2023)    Nursing - Inadequate Food Risk Classification     Worried About Running Out of Food in the Last Year: Never true     Ran Out of Food in the Last Year: Never true     Ran Out of Food in the Last Year: Not on file   Transportation Needs: No Transportation Needs (10/26/2023)    PRAPARE - Transportation     Lack of Transportation (Medical): No     Lack of Transportation (Non-Medical): No   Physical Activity: Not on file   Stress: Not on file   Social Connections: Not on file   Intimate Partner Violence: Not on file   Housing Stability: Low Risk  (4/3/2024)    Housing Stability Vital Sign     Unable to Pay for Housing in the Last Year: No     Number of Times Moved in the Last Year: 1     Homeless in the Last Year: No       Current Outpatient Medications:     lisinopril (ZESTRIL) 10 mg tablet, Take 1 tablet (10 mg total) by mouth daily (Patient not taking: Reported on 2024), Disp: 30 tablet, Rfl: 2    tirzepatide (Zepbound) 10 mg/0.5 mL auto-injector, Inject 0.5 mL (10 mg total)  "under the skin once a week, Disp: 2 mL, Rfl: 2      Food Intake and Lifestyle Assessment   Food Intake Assessment completed via usual diet recall  Breakfast: Varies when he wakes up   Generally around 10 am   Bagel and cream cheese or egg sandwich   Snack: 0   Lunch: 12 noon  to 2 pm   [place] or hot pocket   Snack: 0  Dinner: 8 pm - rice chicken and beans   Snack: 0  Beverage intake: water   Protein supplement: none   Estimated protein intake per day: ~80-90 grams   Estimated fluid intake per day: adequate per patient.   Meals eaten away from home: Once to twice per week   Typical meal pattern: 3 meals per day and 0-1 snacks per day  Eating Behaviors: Consumption of high calorie/ high fat foods and Large portion sizes  Food allergies or intolerances:     Allergies   Allergen Reactions    Tuberculin Ppd Other (See Comments)     Other reaction(s): Positive skin tests in past     Cultural or Shinto considerations:      Physical Assessment  Physical Activity  Types of exercise: walking, housework and laundry   Current physical limitations: none     Psychosocial Assessment   Support systems: mom and dad and friends   Socioeconomic factors: lives alone,   Pt lost a friend several months ago which has really affected him.     Nutrition Diagnosis- continued   Diagnosis: Overweight / Obesity (NC-3.3)  Related to: Physical inactivity and Excessive energy intake  As Evidenced by: BMI >25 and Unintentional weight gain     Nutrition Prescription: Recommend the following diet  9007-5661 calories/ 130-135 grams protein   Pt has been food logging to 1400 calories or less will reduce calorie goal   1800 calories/ 90 grams protein     Interventions and Teaching   Discussed pre-op and post-op nutrition guidelines.       Patient educated and handouts provided.  Surgical changes to stomach / GI  Capacity of post-surgery stomach  Diet progression  Adequate hydration  Sugar and fat restriction to decrease \"dumping " "syndrome\"  Fat restriction to decrease steatorrhea  Expected weight loss  Weight loss plateaus/ possibility of weight regain  Exercise  Suggestions for pre-op diet  Nutrition considerations after surgery  Protein supplements  Meal planning and preparation  Appropriate carbohydrate, protein, and fat intake, and food/fluid choices to maximize safe weight loss, nutrient intake, and tolerance   Dietary and lifestyle changes  Possible problems with poor eating habits  Intuitive eating  Techniques for self monitoring and keeping daily food journal  Potential for food intolerance after surgery, and ways to deal with them including: lactose intolerance, nausea, reflux, vomiting, diarrhea, food intolerance, appetite changes, gas  Vitamin / Mineral supplementation of Multivitamin with minerals and Vitamin D    Education provided to: patient    Barriers to learning: No barriers identified    Readiness to change: contemplation and preparation    Prior research on procedure: discussed with provider    Comprehension: needs reinforcement and verbalizes understanding     Expected Compliance: good    Recommendations  Pt is an appropriate candidate for surgery. Yes  Pt should make the following changes and then be reassessed for weight loss surgery:   Pt with negative nicotine test 2024    Workflow: (Incomplete in Bold):  Psych and/or D+A Clearance: N/A  Blood Work: ordered TSH  PCP letter: done  Surgeon Appt: done  EGD: 2024  Cardiac Risk Assessment with [id number]  MWM consult : 2024  Sleep Studies:done  Nicotine test:negative, will need repeat level 3 1/2 weeks prior to surgery date   Pre-Operative Program:   NAFLD Score Calculated: n/a  Hepatology consult: done       Evaluation / Monitoring  Dietitian to Monitor: Eating pattern as discussed Tolerance of nutrition prescription Body weight Lab values Physical activity Bowel pattern  Patient is taking a men's health vitamin and mineral daily.  He is also " taking vitamin D3 and fish oils daily.  He has increased his fluid intake to 5 bottles of water per day and sometimes Gatorade zero. He has completely eliminated  eating out which contributed to some of his recent weight loss. Patient was taking zepbound as ordered  but had to stop prior to his EGD on 11/13/2024.  He needs to get a refill to continue taking.  Message sent to provider. Pt reports that he experienced  decreased appetite and early satiety while taking his medication. He has been food logging to 1400 calories or less.   Pt is making lifestyle changes in preparation for surgery     Goals  Eliminate sugar sweetened beverages, Food journal, Complete lession plans 1-6, Eat 3 meals per day, and Eliminate mindless snacking  Food log on baritastic - 1800 calories or less   Practice not drinking before and during meals   Practice sipping liquids.       Time Spent:   30 minutes

## 2024-11-22 ENCOUNTER — TELEPHONE (OUTPATIENT)
Dept: BARIATRICS | Facility: CLINIC | Age: 29
End: 2024-11-22

## 2024-11-22 NOTE — TELEPHONE ENCOUNTER
Spoke with patient by phone  Advised him to contact [facility] for refill, as [facility] was sent with 2 refills.   Last dose was about 2 weeks ago  Advised him to resume asap but let us know if any delays, if so we will need to resume a lower dose.

## 2024-12-05 ENCOUNTER — OFFICE VISIT (OUTPATIENT)
Dept: CARDIOLOGY CLINIC | Facility: CLINIC | Age: 29
End: 2024-12-05
Payer: COMMERCIAL

## 2024-12-05 VITALS
WEIGHT: 315 LBS | HEART RATE: 83 BPM | SYSTOLIC BLOOD PRESSURE: 144 MMHG | BODY MASS INDEX: 40.43 KG/M2 | DIASTOLIC BLOOD PRESSURE: 80 MMHG | HEIGHT: 74 IN

## 2024-12-05 DIAGNOSIS — Z01.810 PRE-OPERATIVE CARDIOVASCULAR EXAMINATION: ICD-10-CM

## 2024-12-05 DIAGNOSIS — E66.01 CLASS 3 SEVERE OBESITY DUE TO EXCESS CALORIES WITHOUT SERIOUS COMORBIDITY WITH BODY MASS INDEX (BMI) OF 60.0 TO 69.9 IN ADULT (HCC): ICD-10-CM

## 2024-12-05 DIAGNOSIS — E66.813 CLASS 3 SEVERE OBESITY DUE TO EXCESS CALORIES WITHOUT SERIOUS COMORBIDITY WITH BODY MASS INDEX (BMI) OF 60.0 TO 69.9 IN ADULT (HCC): ICD-10-CM

## 2024-12-05 DIAGNOSIS — Z01.811 PRE-OP CHEST EXAM: Primary | ICD-10-CM

## 2024-12-05 PROCEDURE — 93000 ELECTROCARDIOGRAM COMPLETE: CPT | Performed by: INTERNAL MEDICINE

## 2024-12-05 PROCEDURE — 99244 OFF/OP CNSLTJ NEW/EST MOD 40: CPT | Performed by: INTERNAL MEDICINE

## 2024-12-05 NOTE — ASSESSMENT & PLAN NOTE
Prior to intermediate cardiac risk bariatric surgery  No prior cardiac history  Does have obstructive sleep apnea for which he has CPAP  No current symptoms or ECG changes suggestive of ischemia, heart failure, or arrhythmias  Proceed with planned surgery without further cardiac workup

## 2024-12-05 NOTE — PROGRESS NOTES
" Cardiology Consultation     Josep Cooper  [id number]  1995  [facility] [address] [address] [phone]    Assessment & Plan  Class 3 severe obesity due to excess calories without serious comorbidity with body mass index (BMI) of 60.0 to 69.9 in adult (HCC)  For bariatric surgery  Currently on Zepbound  Pre-operative cardiovascular examination  Prior to intermediate cardiac risk bariatric surgery  No prior cardiac history  Does have obstructive sleep apnea for which he has CPAP  No current symptoms or ECG changes suggestive of ischemia, heart failure, or arrhythmias  Proceed with planned surgery without further cardiac workup    Chief Complaint   Patient presents with    Consult     Referred by Bariatric surgery. No complaints      Pre-op Clearance     Gastric bypass, TBD       HPI: Patient is here for cardiac evaluation prior to bariatric surgery.  Patient feels well, without complaints.  No reported chest pain, shortness of breath, palpitations, lightheadedness, syncope, LE edema, orthopnea, PND, or significant weight changes.  Patient remains active without any increased fatigue out of the ordinary.        Vitals:    12/05/24 1406   BP: 144/80   BP Location: Left arm   Patient Position: Sitting   Cuff Size: Large   Pulse: 83   Weight: (!) 192 kg (423 lb 3.2 oz)   Height: 6' 2\" (1.88 m)       EKG:  Sinus rhythm  Normal ECG    Review of Systems:  Review of Systems   Constitutional:  Negative for activity change, appetite change, fatigue and fever.   HENT:  Negative for nosebleeds and sore throat.    Eyes:  Negative for photophobia and visual disturbance.   Respiratory:  Negative for cough, chest tightness, shortness of breath and wheezing.    Cardiovascular:  Negative for chest pain, palpitations and leg swelling.   Gastrointestinal:  Negative for abdominal pain, diarrhea, nausea and vomiting.   Endocrine: Negative for " polyuria.   Genitourinary:  Negative for dysuria, frequency and hematuria.   Musculoskeletal:  Negative for arthralgias, back pain and gait problem.   Skin:  Negative for pallor and rash.   Neurological:  Negative for dizziness, syncope, speech difficulty and light-headedness.   Hematological:  Does not bruise/bleed easily.   Psychiatric/Behavioral:  Negative for agitation, behavioral problems and confusion.        Physical Exam:  Physical Exam  Vitals reviewed.   Constitutional:       General: He is not in acute distress.     Appearance: Normal appearance. He is well-developed. He is not diaphoretic.   HENT:      Head: Normocephalic and atraumatic.      Nose: Nose normal.   Eyes:      General: No scleral icterus.     Extraocular Movements: Extraocular movements intact.      Pupils: Pupils are equal, round, and reactive to light.   Neck:      Vascular: No JVD.   Cardiovascular:      Rate and Rhythm: Normal rate and regular rhythm.      Heart sounds: S1 normal and S2 normal. Heart sounds not distant. No murmur heard.     No systolic murmur is present.      No friction rub. No gallop. No S3 sounds.   Pulmonary:      Effort: Pulmonary effort is normal. No respiratory distress.      Breath sounds: Normal breath sounds. No wheezing or rales.   Abdominal:      General: Bowel sounds are normal. There is no distension.      Palpations: Abdomen is soft.   Musculoskeletal:         General: No deformity.      Cervical back: Normal range of motion and neck supple.      Right lower leg: No edema.      Left lower leg: No edema.   Skin:     General: Skin is warm and dry.      Findings: No erythema.   Neurological:      Mental Status: He is alert and oriented to person, place, and time.      Cranial Nerves: No cranial nerve deficit.   Psychiatric:         Mood and Affect: Mood normal.         Behavior: Behavior normal.         Patient Active Problem List   Diagnosis    Insomnia, unspecified    STACI (obstructive sleep apnea)     History of tobacco use    Class 3 severe obesity due to excess calories with body mass index (BMI) of 60.0 to 69.9 in adult (HCC)    Primary hypertension    Pre-operative cardiovascular examination     Past Medical History:   Diagnosis Date    Concussion with loss of consciousness 2017    Last Assessment & Plan: Formatting of this note might be different from the original. Requesting records from [facility]    Hypertension      Social History     Socioeconomic History    Marital status: Single     Spouse name: Not on file    Number of children: 0    Years of education: Not on file    Highest education level: Not on file   Occupational History    Not on file   Tobacco Use    Smoking status: Former     Current packs/day: 0.00     Average packs/day: 0.3 packs/day for 11.7 years (2.9 ttl pk-yrs)     Types: Cigarettes     Start date:      Quit date: 2023     Years since quittin.2    Smokeless tobacco: Never   Vaping Use    Vaping status: Former   Substance and Sexual Activity    Alcohol use: Never    Drug use: Never    Sexual activity: Not on file   Other Topics Concern    Not on file   Social History Narrative    Not on file     Social Drivers of Health     Financial Resource Strain: Low Risk  (10/26/2023)    Overall Financial Resource Strain (CARDIA)     Difficulty of Paying Living Expenses: Not hard at all   Food Insecurity: No Food Insecurity (10/26/2023)    Nursing - Inadequate Food Risk Classification     Worried About Running Out of Food in the Last Year: Never true     Ran Out of Food in the Last Year: Never true     Ran Out of Food in the Last Year: Not on file   Transportation Needs: No Transportation Needs (10/26/2023)    PRAPARE - Transportation     Lack of Transportation (Medical): No     Lack of Transportation (Non-Medical): No   Physical Activity: Not on file   Stress: Not on file   Social Connections: Not on file   Intimate Partner Violence: Not on file   Housing Stability: Low  Risk  (4/3/2024)    Housing Stability Vital Sign     Unable to Pay for Housing in the Last Year: No     Number of Times Moved in the Last Year: 1     Homeless in the Last Year: No      Family History   Problem Relation Age of Onset    Diabetes Paternal Grandfather      History reviewed. No pertinent surgical history.    Current Outpatient Medications:     tirzepatide (Zepbound) 10 mg/0.5 mL auto-injector, Inject 0.5 mL (10 mg total) under the skin once a week, Disp: 2 mL, Rfl: 2    lisinopril (ZESTRIL) 10 mg tablet, Take 1 tablet (10 mg total) by mouth daily (Patient not taking: Reported on 7/16/2024), Disp: 30 tablet, Rfl: 2  Allergies   Allergen Reactions    Tuberculin Ppd Other (See Comments)     Other reaction(s): Positive skin tests in past       Labs:  Hospital Outpatient Visit on 11/13/2024   Component Date Value    Case Report 11/13/2024                      Value:Surgical Pathology Report                         Case: [id number]                                  Authorizing Provider:  Raul Knutson MD        Collected:           11/13/2024 1420              Ordering Location:     [facility]        Received:            11/13/2024 [address] [place] [address] Endoscopy                                                          Pathologist:           Asif Barriga DO                                                          Specimen:    Stomach, r/o h.pylori                                                                      Final Diagnosis 11/13/2024                      Value:A. 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  - No Helicobacter organisms identified on H&E.  - Negative for intestinal metaplasia, dysplasia, and malignancy.          Additional Information 11/13/2024                      Value:All reported additional testing was performed with appropriately reactive controls.  These tests were developed and their performance  "characteristics determined by [facility] or appropriate performing facility, though some tests may be performed on tissues which have not been validated for performance characteristics (such as staining performed on alcohol exposed cell blocks and decalcified tissues).  Results should be interpreted with caution and in the context of the patients’ clinical condition. These tests may not be cleared or approved by the U.S. Food and Drug Administration, though the FDA has determined that such clearance or approval is not necessary. These tests are used for clinical purposes and they should not be regarded as investigational or for research. This laboratory has been approved by CLIA 88, designated as a high-complexity laboratory and is qualified to perform these tests.    Interpretation performed at [facility] [address] [place] [address], [place] [id number].      Gross Description 11/13/2024                      Value:A. The specimen is received in formalin, labeled with the patient's name and hospital number, and is designated \" stomach rule out H. pylori\".  It consists of 2 0.3 cm - 0.5 cm tan-pink tissue fragments.  Entirely submitted in a screen cassette.    Note: The estimated total formalin fixation time based upon information provided by the submitting clinician and the standard processing schedule is under 72 hours.  [facility]     Lab on 09/16/2024   Component Date Value    Nicotine 09/16/2024 <1.0     Cotinine 09/16/2024 <1.0     Hepatitis B Surface Ag 09/16/2024 Non-reactive     Hep A IgM 09/16/2024 Non-reactive     Hepatitis C Ab 09/16/2024 Non-reactive     Hep B C IgM 09/16/2024 Non-reactive     Sodium 09/16/2024 141     Potassium 09/16/2024 3.9     Chloride 09/16/2024 104     CO2 09/16/2024 26     ANION GAP 09/16/2024 11     BUN 09/16/2024 13     Creatinine 09/16/2024 0.74     Glucose, Fasting 09/16/2024 101 (H)     Calcium 09/16/2024 9.5     AST 09/16/2024 " 26     ALT 09/16/2024 44     Alkaline Phosphatase 09/16/2024 99     Total Protein 09/16/2024 7.9     Albumin 09/16/2024 4.4     Total Bilirubin 09/16/2024 0.53     eGFR 09/16/2024 124     WBC 09/16/2024 11.13 (H)     RBC 09/16/2024 4.89     Hemoglobin 09/16/2024 14.1     Hematocrit 09/16/2024 44.4     MCV 09/16/2024 91     MCH 09/16/2024 28.8     MCHC 09/16/2024 31.8     RDW 09/16/2024 13.1     MPV 09/16/2024 10.7     Platelets 09/16/2024 359     nRBC 09/16/2024 0     Segmented % 09/16/2024 69     Immature Grans % 09/16/2024 0     Lymphocytes % 09/16/2024 25     Monocytes % 09/16/2024 5     Eosinophils Relative 09/16/2024 1     Basophils Relative 09/16/2024 0     Absolute Neutrophils 09/16/2024 7.59     Absolute Immature Grans 09/16/2024 0.04     Absolute Lymphocytes 09/16/2024 2.77     Absolute Monocytes 09/16/2024 0.57     Eosinophils Absolute 09/16/2024 0.13     Basophils Absolute 09/16/2024 0.03     Iron Saturation 09/16/2024 20     TIBC 09/16/2024 326     Iron 09/16/2024 65     UIBC 09/16/2024 261     Ferritin 09/16/2024 161    Telephone on 08/03/2024   Component Date Value    Supplier Name 08/03/2024 [employer]     Supplier Phone Number 08/03/2024 [phone]     Order Status 08/03/2024 Delivery Successful     Delivery Request Date 08/03/2024 08/03/2024     Date Delivered  08/03/2024 08/12/2024     Supplier Name 08/03/2024 08/12/2024     Item Description 08/03/2024 CPAP Machine, Resmed     Item Description 08/03/2024 PAP Mask, Nasal, Generic, Fit Upon Setup, 1 per 3 months     Item Description 08/03/2024 PAP Headgear, 1 per 6 months     Item Description 08/03/2024 PAP Humidifier, Heated     Item Description 08/03/2024 PAP Mask Interface Cushion, Nasal, 2 per 1 month     Item Description 08/03/2024 Disposable PAP Filter, 2 per 1 month     Item Description 08/03/2024 Non-Disposable PAP Filter, 1 per 6 months     Item Description 08/03/2024 PAP Machine Tubing, Heated, 1 per 3 months      Item Description 08/03/2024 Humidifier Water Chamber, 1 per 6 months     Item Description 08/03/2024 PAP Monitoring Modem      Lab Results   Component Value Date    TRIG 130 12/22/2023    HDL 43 12/22/2023     Imaging: US right upper quadrant  Result Date: 11/20/2024  Narrative: RIGHT UPPER QUADRANT ULTRASOUND INDICATION: Z01.818: Encounter for other preprocedural examination R74.8: Abnormal levels of other serum enzymes E66.01: Morbid (severe) obesity due to excess calories Z68.44: Body mass index (BMI) 60.0-69.9, adult. COMPARISON: None TECHNIQUE: Real-time ultrasound of the right upper quadrant was performed with a curvilinear transducer with both volumetric sweeps and still imaging techniques. FINDINGS: PANCREAS: Visualized portions of the pancreas are within normal limits. AORTA AND IVC: Visualized portions are normal for patient age. LIVER: Size: Enlarged. The liver measures 21.6 cm in the midclavicular line. Contour: Surface contour is smooth. Parenchyma: There is mild diffuse increased echogenicity with smooth echotexture, without significant beam attenuation or loss of periportal echogenicity. Most consistent with mild hepatic steatosis. Small left liver hypoechoic focus measuring 1.1 x 0.9 x 1 cm, possibly focal fatty sparing but otherwise indeterminate. Limited imaging of the main portal vein shows it to be patent and hepatopetal. BILIARY: No gallbladder findings. No intrahepatic biliary dilatation. CBD measures 3.0 mm. No choledocholithiasis. KIDNEY: Right kidney measures 13.7 x 6.2 x 5.4 cm. Volume 238.9 mL Kidney within normal limits. ASCITES: None.     Impression: 1. Enlarged echogenic liver suggesting hepatic steatosis. 2. 1 cm left liver hypoechoic focus, possibly focal fatty sparing, but is otherwise indeterminant. Consider follow-up ultrasound in 6 months to demonstrate stability. Workstation performed: [id number]     EGD  Result Date: 11/13/2024  Narrative: Table formatting from the original  result was not included. [facility] [address] [facility] [address] [phone] DATE OF SERVICE: 11/13/24 PHYSICIAN(S): Attending: Raul Knutson MD Fellow: No Staff Documented INDICATION: Bariatric surgery status POST-OP DIAGNOSIS: See the impression below. PREPROCEDURE: Informed consent was obtained for the procedure, including sedation.  Risks of perforation, hemorrhage, adverse drug reaction and aspiration were discussed. The patient was placed in the left lateral decubitus position. Patient was explained about the risks and benefits of the procedure. Risks including but not limited to bleeding, infection, and perforation were explained in detail. Also explained about less than 100% sensitivity with the exam and other alternatives. PROCEDURE: EGD DETAILS OF PROCEDURE: Patient was taken to the procedure room where a time out was performed to confirm correct patient and correct procedure. The patient underwent monitored anesthesia care, which was administered by an anesthesia professional. The patient's blood pressure, heart rate, level of consciousness, respirations, oxygen, ECG and ETCO2 were monitored throughout the procedure. The scope was introduced through the mouth and advanced to the second part of the duodenum. Retroflexion was performed in the fundus. Prior to the procedure, the patient's H. Pylori status was unknown. The patient experienced no blood loss. The procedure was not difficult. The patient tolerated the procedure well. There were no apparent adverse events. ANESTHESIA INFORMATION: ASA: III Anesthesia Type: General MEDICATIONS: No administrations occurring from 1414 to 1422 on 11/13/24 FINDINGS: Mild, generalized erythematous mucosa in the stomach; performed 4 cold forceps biopsies to rule out H. pylori Regular Z-line 40 cm from the incisors SPECIMENS: ID Type Source Tests Collected by Time Destination A :   Stomach   11/13/2024 1420      Impression:  Gastritis with mild erythematous mucosa in the stomach; performed cold forceps biopsies to rule out H. pylori RECOMMENDATION: Continue with the bariatric program process and follow up in the office. Biopsy results pending.   Raul Knutson MD

## 2024-12-10 ENCOUNTER — TELEPHONE (OUTPATIENT)
Dept: BARIATRICS | Facility: CLINIC | Age: 29
End: 2024-12-10

## 2024-12-10 NOTE — TELEPHONE ENCOUNTER
Sw contacted [facility] regarding a TSH level needed to complete workflow. VM left requesting this be completed asap.

## 2024-12-10 NOTE — PROGRESS NOTES
Behavioral Health Follow Up Note:      7 / 6  Weight Check:  Surgeon: Dr. Raul Knutson (told him to lose 46 lbs prior to the operation)   Currently on Zepbound as MWM patient   Sleeve    Starting weight 469 #  Today's weight 418.2 #    Surgery month:  DEC/ MANINDER  Surgery deadline: MARCH    Mental health and wellness - Patient is appropriately making changes based off the information contained in the bariatric manual.  Patient is modifying behaviors and demonstrating adapting to change.   Is taking zepbound weekly as MWM patient. Has noticed clothes fit differently with weight loss. Denies current nicotine use. Will get TSH level by Monday to submit case to insurance.     Eating behaviors - consumes 2-3 meals per day, 1-2 protein shakes per day. Is prioritizing protein daily. No tracking food intake. Chewing food well. Meals- eggs with a bagel or smoothie for breakfast, lunch-white rice and chicken or fish with beans of vegetables. Snacks-fruit or granola bar. Minimal appetite with zepbound. Only eats with structure due to limited appetite. Water- four 16oz bottles.     Activity -  limited at this time    Progress toward program requirements    Workflow:  Psych and/or D+A additional documentation  n/a  PCP Letter: 4/3/2024   Support Group: RECOMMENDED  Surgeon Appt.: 4/12/2024   EGD : 11/13/24  Cardiac Risk Assessment: 12/5/24  Sleep Studies: STACI with CPAP-is compliant   Bloodwork: 9/16/24; needs TSH level   Hepatology: 10/30/24  Negative nicotine: 9/16/24; will need repeat level 3 1/2 weeks prior to surgery date      Next weight check scheduled with RD.     Goals:  be mindful of eating speed & winter intake. Continue following the bariatric recommendations to prepare for bariatric surgery

## 2024-12-12 ENCOUNTER — RESULTS FOLLOW-UP (OUTPATIENT)
Age: 29
End: 2024-12-12

## 2024-12-12 DIAGNOSIS — K76.0 METABOLIC DYSFUNCTION-ASSOCIATED STEATOTIC LIVER DISEASE (MASLD): Primary | ICD-10-CM

## 2024-12-12 DIAGNOSIS — R93.2 ABNORMAL ULTRASOUND OF LIVER: ICD-10-CM

## 2024-12-13 ENCOUNTER — CLINICAL SUPPORT (OUTPATIENT)
Dept: BARIATRICS | Facility: CLINIC | Age: 29
End: 2024-12-13

## 2024-12-13 VITALS — BODY MASS INDEX: 53.69 KG/M2 | WEIGHT: 315 LBS

## 2024-12-13 DIAGNOSIS — E66.9 OBESITY, UNSPECIFIED: Primary | ICD-10-CM

## 2024-12-13 PROCEDURE — RECHECK

## 2024-12-13 NOTE — TELEPHONE ENCOUNTER
----- Message from Mary Mello PA-C sent at 12/12/2024 10:34 PM EST -----  Please call the patient and schedule him for a follow-up appointment in mid-June to discuss the results of his repeat ultrasound. Thank you!

## 2024-12-13 NOTE — TELEPHONE ENCOUNTER
Called and spoke to patient. Patient scheduled for a 6m f/up on 6/18/2025. Also reminded patient to call to schedule repeat US Elastography so test is done before f/up

## 2024-12-18 ENCOUNTER — APPOINTMENT (OUTPATIENT)
Dept: LAB | Facility: CLINIC | Age: 29
End: 2024-12-18
Payer: COMMERCIAL

## 2024-12-18 DIAGNOSIS — I10 PRIMARY HYPERTENSION: ICD-10-CM

## 2024-12-18 DIAGNOSIS — Z01.818 PRE-OPERATIVE CLEARANCE: ICD-10-CM

## 2024-12-18 DIAGNOSIS — G47.30 SLEEP APNEA, UNSPECIFIED TYPE: ICD-10-CM

## 2024-12-18 DIAGNOSIS — E66.01 MORBID OBESITY WITH BMI OF 60.0-69.9, ADULT (HCC): ICD-10-CM

## 2024-12-18 DIAGNOSIS — R63.5 ABNORMAL WEIGHT GAIN: ICD-10-CM

## 2024-12-18 LAB — TSH SERPL DL<=0.05 MIU/L-ACNC: 0.68 UIU/ML (ref 0.45–4.5)

## 2024-12-18 PROCEDURE — 80323 ALKALOIDS NOS: CPT

## 2024-12-18 PROCEDURE — 36415 COLL VENOUS BLD VENIPUNCTURE: CPT

## 2024-12-18 PROCEDURE — 84443 ASSAY THYROID STIM HORMONE: CPT

## 2024-12-19 ENCOUNTER — TELEPHONE (OUTPATIENT)
Dept: BARIATRICS | Facility: CLINIC | Age: 29
End: 2024-12-19

## 2024-12-19 ENCOUNTER — RESULTS FOLLOW-UP (OUTPATIENT)
Dept: BARIATRICS | Facility: CLINIC | Age: 29
End: 2024-12-19

## 2024-12-19 NOTE — TELEPHONE ENCOUNTER
How are you tolerating the medication?   [] Nausea  [] Vomiting  [] Diarrhea  [x] Asymptomatic  [] Other:    Last visit weight:425    Current weight:418    Date of last injection: 12/16/24    How many injections do you have left: 0    Would you like an increase in your dose?  [x] Yes   [ ] No

## 2024-12-20 DIAGNOSIS — E66.9 OBESITY, UNSPECIFIED: Primary | ICD-10-CM

## 2024-12-20 RX ORDER — TIRZEPATIDE 10 MG/.5ML
10 INJECTION, SOLUTION SUBCUTANEOUS WEEKLY
Qty: 2 ML | Refills: 1 | Status: SHIPPED | OUTPATIENT
Start: 2024-12-20

## 2024-12-20 NOTE — TELEPHONE ENCOUNTER
Pt has follow up appt already scheduled for 1/27/25 with  and is okay waiting until then to be seen

## 2024-12-20 NOTE — TELEPHONE ENCOUNTER
Has not been seen by provider since July  Will continue same dose Zepbound 10mg  Please schedule follow up visit for evaluation and dose adjustment if needed.

## 2024-12-25 LAB
COTININE SERPL-MCNC: 43.4 NG/ML
NICOTINE SERPL-MCNC: <1 NG/ML

## 2024-12-30 DIAGNOSIS — E66.9 OBESITY, UNSPECIFIED: ICD-10-CM

## 2024-12-30 RX ORDER — TIRZEPATIDE 10 MG/.5ML
10 INJECTION, SOLUTION SUBCUTANEOUS WEEKLY
Qty: 2 ML | Refills: 1 | Status: SHIPPED | OUTPATIENT
Start: 2024-12-30

## 2024-12-30 NOTE — TELEPHONE ENCOUNTER
Patient stated he has no refills and the pharmacy stated there is nothing on file there.     Reason for call:   [x] Refill   [] Prior Auth  [] Other:     Office:   [] PCP/Provider -   [x] Specialty/Provider - /  Kassandra Martinez PA-C     Medication:   tirzepatide (Zepbound) 10 mg/0.5 mL auto-injector       Dose/Frequency: 10 mg, Subcutaneous, Weekly     Quantity: 2 ml    [id number]  [place]  [address]     Does the patient have enough for 3 days?   [] Yes   [x] No - Send as HP to POD      How are you tolerating the medication?   [] Nausea  [] Vomiting  [] Diarrhea  [x] Asymptomatic  [] Other:    Last visit weight: 471    Current weight: 416    Date of last injection: 12/23/24    How many injections do you have left: none left

## 2025-01-13 ENCOUNTER — TELEPHONE (OUTPATIENT)
Dept: BARIATRICS | Facility: CLINIC | Age: 30
End: 2025-01-13

## 2025-01-13 NOTE — TELEPHONE ENCOUNTER
Pt called back, he is currently in [place] and has poor cell service.  I transferred him to Greta in the office to help him

## 2025-01-13 NOTE — TELEPHONE ENCOUNTER
Patient called back and stated that he is in [place] and will not be back until 01/17/25 to come to his appointment with Lilli. He will stop by the office to get scheduled for surgery.

## 2025-01-13 NOTE — TELEPHONE ENCOUNTER
Messages for patient to  call back to schedule surgery date started on 12/30/24. Patient has yet to respond to messages, he was informed if he does not answer the message by end of business day on 01/17/2025 his case will be HALTED.

## 2025-01-15 NOTE — PROGRESS NOTES
Bariatric Nutrition Assessment Note    Type of surgery    Preop- interested in the sleeve gastrectomy   Surgery Date: TBD- 6 month program requirement   Today is 6/6 of program requirement   Surgeon: Dr. Raul Knutson  Per surgeon note :     I have told him to lose 46 lbs prior to the operation.  His BMI is 60 and we need to help him lose weight to make sure that his surgery is safer.   Pt follows with [facility] provider and is ordered zepbound    Pt has lost 65# in preparation for surgery.     Nutrition Assessment   Josep Cooper  29 y.o.  male     Wt with BMI of 25: 194.8  Pre-Op Excess Wt: 274.2 lbs     There were no vitals taken for this visit.   --65.7# x 9 months (14% of body weight )     Wt Readings from Last 3 Encounters:   12/13/24 (!) 190 kg (418 lb 3.2 oz)   12/05/24 (!) 192 kg (423 lb 3.2 oz)   11/21/24 (!) 193 kg (424 lb 12.8 oz)        [place]- [place] Equation:    BTM=9298  Weight Maintenance 3593  Estimated calories for weight loss 2966-2856 ( 1-2# per wk wt loss - sedentary )  Estimated protein needs 88.5-133 grams (1.0-1.5 gms/kg IBW )   Estimated fluid needs 88.5-103 oz per day (30-35 ml/kg IBW )      Weight History   Onset of Obesity: Childhood  Family history of obesity: No  Wt Loss Attempts: Exercise  Self Created Diets (Portion Control, Healthy Food Choices, etc.)  Patient has tried the above for 6 months or more with insufficient weight loss or weight regain, which is why patient has requested to be evaluated for weight loss surgery today  Maximum Wt Lost: 60 lbs with diet and exercise       Review of History and Medications   Past Medical History:   Diagnosis Date    Concussion with loss of consciousness 04/18/2017    Last Assessment & Plan: Formatting of this note might be different from the original. Requesting records from [facility]    Hypertension      No past surgical history on file.  Social History     Socioeconomic History    Marital status: Single     Spouse name: Not on  file    Number of children: 0    Years of education: Not on file    Highest education level: Not on file   Occupational History    Not on file   Tobacco Use    Smoking status: Former     Current packs/day: 0.00     Average packs/day: 0.3 packs/day for 11.7 years (2.9 ttl pk-yrs)     Types: Cigarettes     Start date:      Quit date: 2023     Years since quittin.3    Smokeless tobacco: Never   Vaping Use    Vaping status: Former   Substance and Sexual Activity    Alcohol use: Never    Drug use: Never    Sexual activity: Not on file   Other Topics Concern    Not on file   Social History Narrative    Not on file     Social Drivers of Health     Financial Resource Strain: Low Risk  (10/26/2023)    Overall Financial Resource Strain (CARDIA)     Difficulty of Paying Living Expenses: Not hard at all   Food Insecurity: No Food Insecurity (10/26/2023)    Nursing - Inadequate Food Risk Classification     Worried About Running Out of Food in the Last Year: Never true     Ran Out of Food in the Last Year: Never true     Ran Out of Food in the Last Year: Not on file   Transportation Needs: No Transportation Needs (10/26/2023)    PRAPARE - Transportation     Lack of Transportation (Medical): No     Lack of Transportation (Non-Medical): No   Physical Activity: Not on file   Stress: Not on file   Social Connections: Not on file   Intimate Partner Violence: Not on file   Housing Stability: Low Risk  (4/3/2024)    Housing Stability Vital Sign     Unable to Pay for Housing in the Last Year: No     Number of Times Moved in the Last Year: 1     Homeless in the Last Year: No       Current Outpatient Medications:     lisinopril (ZESTRIL) 10 mg tablet, Take 1 tablet (10 mg total) by mouth daily (Patient not taking: Reported on 2024), Disp: 30 tablet, Rfl: 2    tirzepatide (Zepbound) 10 mg/0.5 mL auto-injector, Inject 0.5 mL (10 mg total) under the skin once a week, Disp: 2 mL, Rfl: 1      Food Intake and Lifestyle Assessment  "  Food Intake Assessment completed via usual diet recall  Breakfast: Varies when he wakes up   Generally around 10 am   Bagel and cream cheese or egg sandwich   Snack: 0   Lunch: 12 noon  to 2 pm   [place] or hot pocket   Snack: 0  Dinner: 8 pm - rice chicken and beans   Snack: 0  Beverage intake: water   Protein supplement: none   Estimated protein intake per day: ~80-90 grams   Estimated fluid intake per day: adequate per patient.   Meals eaten away from home: Once to twice per week   Typical meal pattern: 3 meals per day and 0-1 snacks per day  Eating Behaviors: Consumption of high calorie/ high fat foods and Large portion sizes  Food allergies or intolerances:     Allergies   Allergen Reactions    Tuberculin Ppd Other (See Comments)     Other reaction(s): Positive skin tests in past     Cultural or Confucianist considerations:      Physical Assessment  Physical Activity  Types of exercise: walking, housework and laundry   Current physical limitations: none     Psychosocial Assessment   Support systems: mom and dad and friends   Socioeconomic factors: lives alone,   Pt lost a friend several months ago which has really affected him.     Nutrition Diagnosis- continued   Diagnosis: Overweight / Obesity (NC-3.3)  Related to: Physical inactivity and Excessive energy intake  As Evidenced by: BMI >25 and Unintentional weight gain     Nutrition Prescription: Recommend the following diet  3950-8713 calories/ 130-135 grams protein   Pt has been food logging to 1400 calories or less will reduce calorie goal   1800 calories/ 90 grams protein     Interventions and Teaching   Discussed pre-op and post-op nutrition guidelines.       Patient educated and handouts provided.  Surgical changes to stomach / GI  Capacity of post-surgery stomach  Diet progression  Adequate hydration  Sugar and fat restriction to decrease \"dumping syndrome\"  Fat restriction to decrease steatorrhea  Expected weight loss  Weight loss plateaus/ " possibility of weight regain  Exercise  Suggestions for pre-op diet  Nutrition considerations after surgery  Protein supplements  Meal planning and preparation  Appropriate carbohydrate, protein, and fat intake, and food/fluid choices to maximize safe weight loss, nutrient intake, and tolerance   Dietary and lifestyle changes  Possible problems with poor eating habits  Intuitive eating  Techniques for self monitoring and keeping daily food journal  Potential for food intolerance after surgery, and ways to deal with them including: lactose intolerance, nausea, reflux, vomiting, diarrhea, food intolerance, appetite changes, gas  Vitamin / Mineral supplementation of Multivitamin with minerals and Vitamin D- provided with 30 day supply of bariatric vitamins and minerals ( Bariatric Pal ) and Bariatric Advantage Calcium chews.     Education provided to: patient    Barriers to learning: No barriers identified    Readiness to change: contemplation and preparation    Prior research on procedure: discussed with provider    Comprehension: needs reinforcement and verbalizes understanding     Expected Compliance: good    Recommendations  Pt is an appropriate candidate for surgery. Yes  Pt should make the following changes and then be reassessed for weight loss surgery:   Pt with negative nicotine test 2024    Workflow: (Incomplete in Bold):  Psych and/or D+A Clearance: N/A  Blood Work: done   PCP letter: done  Surgeon Appt: done  EGD: 2024  Cardiac Risk Assessment with EC2024  [facility] consult : 2024  Sleep Studies:done  Nicotine test:negative, will need repeat level 3 1/2 weeks prior to surgery date   Pre-Operative Program: done  NAFLD Score Calculated: n/a  Hepatology consult: done       Evaluation / Monitoring  Dietitian to Monitor: Eating pattern as discussed Tolerance of nutrition prescription Body weight Lab values Physical activity Bowel pattern  Patient is taking a men's health vitamin and mineral  daily.  He is also taking vitamin D3 and fish oils daily.  He has increased his fluid intake to 5 bottles of water per day and sometimes Gatorade zero. He has completely eliminated  eating out which contributed to some of his recent weight loss. Patient has been taking zepbound as prescribed to assist with pre op weight loss.  Pt reports that he experienced  decreased appetite and early satiety while taking his medication. He has been food logging to 1400 calories or less.  He is priacticing the 30/30 rule  Pt is making lifestyle changes in preparation for surgery     Goals  Eliminate sugar sweetened beverages, Food journal, Complete lession plans 1-6, Eat 3 meals per day, and Eliminate mindless snacking  Food log on baritastic - 1800 calories or less   Practice not drinking before and during meals    Start with 15/15, progress to 30/30 with end goal of 30/60 rule   Practice sipping liquids.   Time meals to take 15-20 minutes, chew food well   Sample vitamins and minerals provided       Time Spent:   15 minutes

## 2025-01-17 ENCOUNTER — PREP FOR PROCEDURE (OUTPATIENT)
Dept: BARIATRICS | Facility: CLINIC | Age: 30
End: 2025-01-17

## 2025-01-17 ENCOUNTER — CLINICAL SUPPORT (OUTPATIENT)
Dept: BARIATRICS | Facility: CLINIC | Age: 30
End: 2025-01-17

## 2025-01-17 VITALS — BODY MASS INDEX: 52.51 KG/M2 | WEIGHT: 315 LBS

## 2025-01-17 DIAGNOSIS — Z01.818 PRE-OPERATIVE CLEARANCE: Primary | ICD-10-CM

## 2025-01-17 DIAGNOSIS — I10 ESSENTIAL HYPERTENSION, MALIGNANT: ICD-10-CM

## 2025-01-17 DIAGNOSIS — G47.33 OBSTRUCTIVE SLEEP APNEA (ADULT) (PEDIATRIC): ICD-10-CM

## 2025-01-17 DIAGNOSIS — E66.01 MORBID OBESITY WITH BMI OF 50.0-59.9, ADULT (HCC): ICD-10-CM

## 2025-01-17 DIAGNOSIS — G47.30 SLEEP APNEA, UNSPECIFIED TYPE: ICD-10-CM

## 2025-01-17 DIAGNOSIS — Z87.891 HISTORY OF TOBACCO USE: ICD-10-CM

## 2025-01-17 DIAGNOSIS — G47.33 OSA (OBSTRUCTIVE SLEEP APNEA): ICD-10-CM

## 2025-01-17 DIAGNOSIS — Z01.810 PRE-OPERATIVE CARDIOVASCULAR EXAMINATION: ICD-10-CM

## 2025-01-17 DIAGNOSIS — F17.211 CIGARETTE NICOTINE DEPENDENCE IN REMISSION: ICD-10-CM

## 2025-01-17 DIAGNOSIS — I10 PRIMARY HYPERTENSION: ICD-10-CM

## 2025-01-17 DIAGNOSIS — E66.01 MORBID OBESITY (HCC): Primary | ICD-10-CM

## 2025-01-17 PROCEDURE — RECHECK: Performed by: DIETITIAN, REGISTERED

## 2025-01-20 ENCOUNTER — APPOINTMENT (OUTPATIENT)
Dept: LAB | Facility: CLINIC | Age: 30
End: 2025-01-20
Payer: COMMERCIAL

## 2025-01-20 DIAGNOSIS — E66.01 MORBID OBESITY WITH BMI OF 50.0-59.9, ADULT (HCC): ICD-10-CM

## 2025-01-20 DIAGNOSIS — Z01.818 PRE-OPERATIVE CLEARANCE: ICD-10-CM

## 2025-01-20 DIAGNOSIS — Z01.810 PRE-OPERATIVE CARDIOVASCULAR EXAMINATION: ICD-10-CM

## 2025-01-20 DIAGNOSIS — G47.33 OSA (OBSTRUCTIVE SLEEP APNEA): ICD-10-CM

## 2025-01-20 DIAGNOSIS — F17.211 CIGARETTE NICOTINE DEPENDENCE IN REMISSION: ICD-10-CM

## 2025-01-20 DIAGNOSIS — I10 PRIMARY HYPERTENSION: ICD-10-CM

## 2025-01-20 DIAGNOSIS — G47.30 SLEEP APNEA, UNSPECIFIED TYPE: ICD-10-CM

## 2025-01-20 DIAGNOSIS — Z87.891 HISTORY OF TOBACCO USE: ICD-10-CM

## 2025-01-20 PROCEDURE — 80323 ALKALOIDS NOS: CPT

## 2025-01-23 ENCOUNTER — TELEPHONE (OUTPATIENT)
Dept: BARIATRICS | Facility: CLINIC | Age: 30
End: 2025-01-23

## 2025-01-23 ENCOUNTER — OFFICE VISIT (OUTPATIENT)
Dept: BARIATRICS | Facility: CLINIC | Age: 30
End: 2025-01-23
Payer: COMMERCIAL

## 2025-01-23 ENCOUNTER — CLINICAL SUPPORT (OUTPATIENT)
Dept: BARIATRICS | Facility: CLINIC | Age: 30
End: 2025-01-23

## 2025-01-23 VITALS
WEIGHT: 315 LBS | SYSTOLIC BLOOD PRESSURE: 132 MMHG | HEART RATE: 87 BPM | BODY MASS INDEX: 41.75 KG/M2 | TEMPERATURE: 98.4 F | OXYGEN SATURATION: 99 % | HEIGHT: 73 IN | DIASTOLIC BLOOD PRESSURE: 84 MMHG

## 2025-01-23 VITALS — BODY MASS INDEX: 52.9 KG/M2 | WEIGHT: 315 LBS

## 2025-01-23 DIAGNOSIS — I10 PRIMARY HYPERTENSION: ICD-10-CM

## 2025-01-23 DIAGNOSIS — E66.01 CLASS 3 SEVERE OBESITY DUE TO EXCESS CALORIES WITHOUT SERIOUS COMORBIDITY WITH BODY MASS INDEX (BMI) OF 60.0 TO 69.9 IN ADULT (HCC): Primary | ICD-10-CM

## 2025-01-23 DIAGNOSIS — G47.33 OSA (OBSTRUCTIVE SLEEP APNEA): ICD-10-CM

## 2025-01-23 DIAGNOSIS — E66.813 CLASS 3 SEVERE OBESITY DUE TO EXCESS CALORIES WITHOUT SERIOUS COMORBIDITY WITH BODY MASS INDEX (BMI) OF 60.0 TO 69.9 IN ADULT (HCC): Primary | ICD-10-CM

## 2025-01-23 PROCEDURE — 99213 OFFICE O/P EST LOW 20 MIN: CPT | Performed by: SURGERY

## 2025-01-23 PROCEDURE — RECHECK: Performed by: DIETITIAN, REGISTERED

## 2025-01-23 RX ORDER — CELECOXIB 200 MG/1
200 CAPSULE ORAL ONCE
OUTPATIENT
Start: 2025-01-23 | End: 2025-01-23

## 2025-01-23 RX ORDER — ENOXAPARIN SODIUM 300 MG/3ML
60 INJECTION INTRAVENOUS; SUBCUTANEOUS ONCE
OUTPATIENT
Start: 2025-01-23 | End: 2025-01-23

## 2025-01-23 RX ORDER — ACETAMINOPHEN 10 MG/ML
1000 INJECTION, SOLUTION INTRAVENOUS ONCE
OUTPATIENT
Start: 2025-01-23 | End: 2025-01-23

## 2025-01-23 NOTE — TELEPHONE ENCOUNTER
Patient was called and he states that he was not able to make the Preop class today (01/23/25) due to his car not starting. I informed the patient that he will be having an appt with the dietitian to discuss the liquid diet and that the Preop class will be rescheduled to 01/30/25. He was told that he could not miss that appointment and if he missed the appointment he would need to reschedule the surgery. Patient  was also informed that if his nicotine test comes back positive for nicotine his surgery may have to be pushed back until he is negative.

## 2025-01-23 NOTE — PROGRESS NOTES
Bariatric Nutrition Assessment Note    Type of surgery    Preop- interested in the sleeve gastrectomy   Surgery Date: TBD- 6 month program requirement   Today is 6/6 of program requirement   Surgeon: Dr. Raul Knutson  Per surgeon note :     I have told him to lose 46 lbs prior to the operation.  His BMI is 60 and we need to help him lose weight to make sure that his surgery is safer.   Pt follows with [facility] provider and is ordered zepbound    Pt has lost 65# in preparation for surgery.     Nutrition Assessment   Josep Cooper  29 y.o.  male   Wt with BMI of 25: 194.8  Pre-Op Excess Wt: 274.2 lbs   Wt (!) 187 kg (412 lb)   BMI 52.90 kg/m²    Wt Readings from Last 3 Encounters:   01/23/25 (!) 187 kg (412 lb)   01/17/25 (!) 186 kg (409 lb)   12/13/24 (!) 190 kg (418 lb 3.2 oz)      Colette Alexander Equation:    TYT=5280  Weight Maintenance 3593  Estimated calories for weight loss 3762-5337 ( 1-2# per wk wt loss - sedentary )  Estimated protein needs 88.5-133 grams (1.0-1.5 gms/kg IBW )   Estimated fluid needs 88.5-103 oz per day (30-35 ml/kg IBW )      Weight History   Onset of Obesity: Childhood  Family history of obesity: No  Wt Loss Attempts: Exercise  Self Created Diets (Portion Control, Healthy Food Choices, etc.)  Patient has tried the above for 6 months or more with insufficient weight loss or weight regain, which is why patient has requested to be evaluated for weight loss surgery today  Maximum Wt Lost: 60 lbs with diet and exercise     Review of History and Medications   Past Medical History:   Diagnosis Date    Concussion with loss of consciousness 04/18/2017    Last Assessment & Plan: Formatting of this note might be different from the original. Requesting records from [facility]    Hypertension      No past surgical history on file.  Social History     Socioeconomic History    Marital status: Single     Spouse name: Not on file    Number of children: 0    Years of education: Not on file     Highest education level: Not on file   Occupational History    Not on file   Tobacco Use    Smoking status: Former     Current packs/day: 0.00     Average packs/day: 0.3 packs/day for 11.7 years (2.9 ttl pk-yrs)     Types: Cigarettes     Start date:      Quit date: 2023     Years since quittin.3    Smokeless tobacco: Never   Vaping Use    Vaping status: Former   Substance and Sexual Activity    Alcohol use: Never    Drug use: Never    Sexual activity: Not on file   Other Topics Concern    Not on file   Social History Narrative    Not on file     Social Drivers of Health     Financial Resource Strain: Low Risk  (10/26/2023)    Overall Financial Resource Strain (CARDIA)     Difficulty of Paying Living Expenses: Not hard at all   Food Insecurity: No Food Insecurity (10/26/2023)    Nursing - Inadequate Food Risk Classification     Worried About Running Out of Food in the Last Year: Never true     Ran Out of Food in the Last Year: Never true     Ran Out of Food in the Last Year: Not on file   Transportation Needs: No Transportation Needs (10/26/2023)    PRAPARE - Transportation     Lack of Transportation (Medical): No     Lack of Transportation (Non-Medical): No   Physical Activity: Not on file   Stress: Not on file   Social Connections: Not on file   Intimate Partner Violence: Not on file   Housing Stability: Low Risk  (4/3/2024)    Housing Stability Vital Sign     Unable to Pay for Housing in the Last Year: No     Number of Times Moved in the Last Year: 1     Homeless in the Last Year: No       Current Outpatient Medications:     lisinopril (ZESTRIL) 10 mg tablet, Take 1 tablet (10 mg total) by mouth daily (Patient not taking: Reported on 2024), Disp: 30 tablet, Rfl: 2    tirzepatide (Zepbound) 10 mg/0.5 mL auto-injector, Inject 0.5 mL (10 mg total) under the skin once a week, Disp: 2 mL, Rfl: 1    Food Intake and Lifestyle Assessment   Food Intake Assessment completed via usual diet recall  Breakfast:  Varies when he wakes up   Generally around 10 am   Bagel and cream cheese or egg sandwich   Snack: 0   Lunch: 12 noon  to 2 pm   [place] or hot pocket   Snack: 0  Dinner: 8 pm - rice chicken and beans   Snack: 0  Beverage intake: water   Protein supplement: none   Estimated protein intake per day: ~80-90 grams   Estimated fluid intake per day: adequate per patient.   Meals eaten away from home: Once to twice per week   Typical meal pattern: 3 meals per day and 0-1 snacks per day  Eating Behaviors: Consumption of high calorie/ high fat foods and Large portion sizes  Food allergies or intolerances:     Allergies   Allergen Reactions    Tuberculin Ppd Other (See Comments)     Other reaction(s): Positive skin tests in past     Cultural or Taoism considerations:      Physical Assessment  Physical Activity  Types of exercise: walking, housework and laundry   Current physical limitations: none     Psychosocial Assessment   Support systems: mom and dad and friends   Socioeconomic factors: lives alone  Pt lost a friend several months ago which has really affected him.     Nutrition Diagnosis- continued   Diagnosis: Overweight / Obesity (NC-3.3)  Related to: Physical inactivity and Excessive energy intake  As Evidenced by: BMI >25 and Unintentional weight gain     Nutrition Prescription: Recommend the following diet  8777-6637 calories/ 130-135 grams protein     Interventions and Teaching   Discussed pre-op and post-op nutrition guidelines:  Pt. educated on the pre operative liver shrinking diet.  Pt understands that the diet needs to be followed for 2 weeks prior to surgery. Handout reviewed.   Emphasized the need to drink 80 ounces of fluid per day while on the diet. Patient will return for a pre op weight check to ensure she has met her goal.   Patient was able to verbalize basic diet (protein, fluid, vitamin and mineral) recommendations and possible nutrition-related complications. Yes   Contact information  for any questions/concerns  Pt instructed to hold GLP-! Medications within a week of surgery.    Education provided to: patient  Barriers to learning: No barriers identified  Readiness to change: contemplation and preparation  Prior research on procedure: discussed with provider  Comprehension: needs reinforcement and verbalizes understanding   Expected Compliance: good    Recommendations  Pt is an appropriate candidate for surgery. Yes    Workflow: (Incomplete in Bold):  Psych and/or D+A Clearance: N/A  Blood Work: done   PCP letter: done  Surgeon Appt: done  EGD: 2024  Cardiac Risk Assessment with EC2024  [facility] consult : 2024  Sleep Studies:done  Nicotine test:negative, will need repeat level 3 1/2 weeks prior to surgery date  Nicotine test drawn 25- results pending  Pre-Operative Program: done  NAFLD Score Calculated: n/a  Hepatology consult: done     Evaluation / Monitoring  Dietitian to Monitor: Eating pattern as discussed Tolerance of nutrition prescription Body weight Lab values Physical activity Bowel pattern    Goals  Eliminate sugar sweetened beverages, Food journal, Complete lession plans 1-6, Eat 3 meals per day, and Eliminate mindless snacking      Time Spent:   15 minutes

## 2025-01-23 NOTE — H&P
"BARIATRIC H&P - BARIATRIC SURGERY  Josep Cooper 29 y.o. male MRN: [id number]  Unit/Bed#:  Encounter: [id number]      HPI:  Josep Cooper is a 29 y.o. male who presents with a long-standing history of morbid obesity.      He was found to be a good candidate to undergo a bariatric operation upon being [facility].      He is here today to discuss details of his surgery.    Review of Systems   All other systems reviewed and are negative.      Historical Information   Past Medical History:   Diagnosis Date    Concussion with loss of consciousness 2017    Last Assessment & Plan: Formatting of this note might be different from the original. Requesting records from [facility]    Hypertension      History reviewed. No pertinent surgical history.  Social History   Social History     Substance and Sexual Activity   Alcohol Use Never     Social History     Substance and Sexual Activity   Drug Use Never     Social History     Tobacco Use   Smoking Status Former    Current packs/day: 0.00    Average packs/day: 0.3 packs/day for 11.7 years (2.9 ttl pk-yrs)    Types: Cigarettes    Start date: 2012    Quit date: 2023    Years since quittin.3   Smokeless Tobacco Never     Family History: Family history non-contributory    Meds/Allergies   all medications and allergies reviewed  Allergies   Allergen Reactions    Tuberculin Ppd Other (See Comments)     Other reaction(s): Positive skin tests in past       Objective     Current Vitals:   Blood Pressure: 132/84 (25 1325)  Pulse: 87 (25 1325)  Temperature: 98.4 °F (36.9 °C) (25 1325)  Temp Source: Tympanic (25 1325)  Height: 6' 1\" (185.4 cm) (25 1325)  Weight - Scale: (!) 187 kg (411 lb 8 oz) (25 1325)  SpO2: 99 % (25 1325)      Invasive Devices       None                   Physical Exam  Vitals and nursing note reviewed.   Constitutional:       Appearance: Normal appearance. He is " well-developed.   HENT:      Head: Normocephalic and atraumatic.      Nose: Nose normal.   Eyes:      General: No scleral icterus.        Right eye: No discharge.         Left eye: No discharge.      Conjunctiva/sclera: Conjunctivae normal.   Cardiovascular:      Rate and Rhythm: Normal rate and regular rhythm.      Heart sounds: Normal heart sounds.   Pulmonary:      Effort: Pulmonary effort is normal.      Breath sounds: Normal breath sounds. No stridor. No wheezing or rales.   Chest:      Chest wall: No tenderness.   Abdominal:      General: Bowel sounds are normal.      Palpations: Abdomen is soft.      Tenderness: There is no abdominal tenderness. There is no guarding or rebound.      Comments: Abdomen is obese, soft and benign.   Musculoskeletal:         General: Normal range of motion.      Cervical back: Normal range of motion and neck supple.   Lymphadenopathy:      Cervical: No cervical adenopathy.   Skin:     General: Skin is warm and dry.      Findings: No erythema or rash.   Neurological:      Mental Status: He is alert and oriented to person, place, and time.   Psychiatric:         Behavior: Behavior normal.         Thought Content: Thought content normal.         Judgment: Judgment normal.         Lab Results: I have personally reviewed pertinent lab results.    Imaging: Results Review Statement: No pertinent imaging studies reviewed.  EKG, Pathology, and Other Studies: Results Review Statement: No pertinent imaging studies reviewed.  The endoscopy showed gastritis.  The biopsies revealed  Final Diagnosis  A. 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  - No Helicobacter organisms identified on H&E.  - Negative for intestinal metaplasia, dysplasia, and malignancy.          Assessment/PLAN:    29 y.o. male morbidly obese found to be a good candidate to undergo a weight loss operation upon being enrolled here at the [facility].      Patient  has a long history of morbid obesity and is presenting to discuss the surgical weight loss options. Despite the patient best efforts patient was unable to lose any meaningful or sustainable weight using nonsurgical means.We had a long discussion regarding all the surgical weight-loss options at our disposal at this point and reviewed the risks and benefits of each procedure in details as it relates to his age, BMI and medical conditions.    He has been pre certified to undergo a Laparoscopic sleeve gastrectomy.    We have discussed the 14%-20% incidence of post op heartburn after the sleeve gastrectomy and the fact that if this cannot be controlled with medication or conservative measures it might need to be converted to a Raphael-en-Y gastric bypass.    We have also discussed the fact that the patient needs to be followed up postoperatively and potentially get screening endoscopies in the future to rule out any development of pathology as a result of the sleeve gastrectomy.      Here today to review his pre op test results.      Has been medically cleared for the procedure.      I have discussed with him at length the risks and benefits of the operation and reiterated the components of our multidisciplinary program and the importance of compliance and follow up in the post operative period. Although there is a great statistical chance of improvement or even resolution of most of his associated comorbidities, the results vary from patient to patient and they largely depend on his commitment.     The patient was also instructed with regards to the importance of behavior modification, nutritional counseling, support meeting attendance and lifestyle changes that are important to ensure success.    I have explained and reviewed the instructions for stopping or tapering anti-obesity medications prior to surgery.  He was given the opportunity to ask questions and I have answered all of them.    I have addressed with the  patient the level of CODE STATUS for this hospital stay and after explaining the different options currently he wishes to be a Level I.     He understands and wishes to proceed.    He has lost all the weight required prior to surgery.  He tells me that he quit smoking but unfortunately he is exposed to secondhand smoking.  Nicotine test is pending.

## 2025-01-23 NOTE — H&P (VIEW-ONLY)
"BARIATRIC H&P - BARIATRIC SURGERY  Josep Cooper 29 y.o. male MRN: [id number]  Unit/Bed#:  Encounter: [id number]      HPI:  Josep Cooper is a 29 y.o. male who presents with a long-standing history of morbid obesity.      He was found to be a good candidate to undergo a bariatric operation upon being [facility].      He is here today to discuss details of his surgery.    Review of Systems   All other systems reviewed and are negative.      Historical Information   Past Medical History:   Diagnosis Date    Concussion with loss of consciousness 2017    Last Assessment & Plan: Formatting of this note might be different from the original. Requesting records from [facility]    Hypertension      History reviewed. No pertinent surgical history.  Social History   Social History     Substance and Sexual Activity   Alcohol Use Never     Social History     Substance and Sexual Activity   Drug Use Never     Social History     Tobacco Use   Smoking Status Former    Current packs/day: 0.00    Average packs/day: 0.3 packs/day for 11.7 years (2.9 ttl pk-yrs)    Types: Cigarettes    Start date: 2012    Quit date: 2023    Years since quittin.3   Smokeless Tobacco Never     Family History: Family history non-contributory    Meds/Allergies   all medications and allergies reviewed  Allergies   Allergen Reactions    Tuberculin Ppd Other (See Comments)     Other reaction(s): Positive skin tests in past       Objective     Current Vitals:   Blood Pressure: 132/84 (25 1325)  Pulse: 87 (25 1325)  Temperature: 98.4 °F (36.9 °C) (25 1325)  Temp Source: Tympanic (25 1325)  Height: 6' 1\" (185.4 cm) (25 1325)  Weight - Scale: (!) 187 kg (411 lb 8 oz) (25 1325)  SpO2: 99 % (25 1325)      Invasive Devices       None                   Physical Exam  Vitals and nursing note reviewed.   Constitutional:       Appearance: Normal appearance. He is " well-developed.   HENT:      Head: Normocephalic and atraumatic.      Nose: Nose normal.   Eyes:      General: No scleral icterus.        Right eye: No discharge.         Left eye: No discharge.      Conjunctiva/sclera: Conjunctivae normal.   Cardiovascular:      Rate and Rhythm: Normal rate and regular rhythm.      Heart sounds: Normal heart sounds.   Pulmonary:      Effort: Pulmonary effort is normal.      Breath sounds: Normal breath sounds. No stridor. No wheezing or rales.   Chest:      Chest wall: No tenderness.   Abdominal:      General: Bowel sounds are normal.      Palpations: Abdomen is soft.      Tenderness: There is no abdominal tenderness. There is no guarding or rebound.      Comments: Abdomen is obese, soft and benign.   Musculoskeletal:         General: Normal range of motion.      Cervical back: Normal range of motion and neck supple.   Lymphadenopathy:      Cervical: No cervical adenopathy.   Skin:     General: Skin is warm and dry.      Findings: No erythema or rash.   Neurological:      Mental Status: He is alert and oriented to person, place, and time.   Psychiatric:         Behavior: Behavior normal.         Thought Content: Thought content normal.         Judgment: Judgment normal.         Lab Results: I have personally reviewed pertinent lab results.    Imaging: Results Review Statement: No pertinent imaging studies reviewed.  EKG, Pathology, and Other Studies: Results Review Statement: No pertinent imaging studies reviewed.  The endoscopy showed gastritis.  The biopsies revealed  Final Diagnosis  A. 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  - No Helicobacter organisms identified on H&E.  - Negative for intestinal metaplasia, dysplasia, and malignancy.          Assessment/PLAN:    29 y.o. male morbidly obese found to be a good candidate to undergo a weight loss operation upon being enrolled here at the [facility].      Patient  has a long history of morbid obesity and is presenting to discuss the surgical weight loss options. Despite the patient best efforts patient was unable to lose any meaningful or sustainable weight using nonsurgical means.We had a long discussion regarding all the surgical weight-loss options at our disposal at this point and reviewed the risks and benefits of each procedure in details as it relates to his age, BMI and medical conditions.    He has been pre certified to undergo a Laparoscopic sleeve gastrectomy.    We have discussed the 14%-20% incidence of post op heartburn after the sleeve gastrectomy and the fact that if this cannot be controlled with medication or conservative measures it might need to be converted to a Raphael-en-Y gastric bypass.    We have also discussed the fact that the patient needs to be followed up postoperatively and potentially get screening endoscopies in the future to rule out any development of pathology as a result of the sleeve gastrectomy.      Here today to review his pre op test results.      Has been medically cleared for the procedure.      I have discussed with him at length the risks and benefits of the operation and reiterated the components of our multidisciplinary program and the importance of compliance and follow up in the post operative period. Although there is a great statistical chance of improvement or even resolution of most of his associated comorbidities, the results vary from patient to patient and they largely depend on his commitment.     The patient was also instructed with regards to the importance of behavior modification, nutritional counseling, support meeting attendance and lifestyle changes that are important to ensure success.    I have explained and reviewed the instructions for stopping or tapering anti-obesity medications prior to surgery.  He was given the opportunity to ask questions and I have answered all of them.    I have addressed with the  patient the level of CODE STATUS for this hospital stay and after explaining the different options currently he wishes to be a Level I.     He understands and wishes to proceed.    He has lost all the weight required prior to surgery.  He tells me that he quit smoking but unfortunately he is exposed to secondhand smoking.  Nicotine test is pending.

## 2025-01-24 DIAGNOSIS — E66.01 MORBID (SEVERE) OBESITY DUE TO EXCESS CALORIES (HCC): Primary | ICD-10-CM

## 2025-01-24 NOTE — PRE-PROCEDURE INSTRUCTIONS
Pre-Surgery Instructions:   Medication Instructions    Cyanocobalamin (VITAMIN B 12 PO) Stop taking 7 days prior to surgery.    Magnesium 250 MG CAPS Stop taking 7 days prior to surgery.    Multiple Vitamins-Minerals (MENS MULTIVITAMIN PO) Stop taking 7 days prior to surgery.    Omega-3 Fatty Acids (OMEGA 3 PO) Stop taking 7 days prior to surgery.    tirzepatide (Zepbound) 10 mg/0.5 mL auto-injector Stop taking 7 days prior to surgery. Last dose was 1/23/25    VITAMIN D, CHOLECALCIFEROL, PO Stop taking 7 days prior to surgery.   Medication instructions for day surgery reviewed. Please use only a sip of water to take your instructed medications. Avoid all over the counter vitamins, supplements and NSAIDS for one week prior to surgery per anesthesia guidelines. Tylenol is ok to take as needed.     You will receive a call one business day prior to surgery with an arrival time and hospital directions. If your surgery is scheduled on a Monday, the hospital will be calling you on the Friday prior to your surgery. If you have not heard from anyone by 8pm, please call the hospital supervisor through the hospital  at [phone]. ([place] [phone] or [place] [phone]).    Do not eat or drink anything after midnight the night before your surgery, including candy, mints, lifesavers, or chewing gum. Do not drink alcohol 24hrs before your surgery. Try not to smoke at least 24hrs before your surgery.       Follow the pre surgery showering instructions as listed in the “My Surgical Experience Booklet” or otherwise provided by your surgeon's office. Do not use a blade to shave the surgical area 1 week before surgery. It is okay to use a clean electric clippers up to 24 hours before surgery. Do not apply any lotions, creams, including makeup, cologne, deodorant, or perfumes after showering on the day of your surgery. Do not use dry shampoo, hair spray, hair gel, or any type of hair products.     No contact lenses,  eye make-up, or artificial eyelashes. Remove nail polish, including gel polish, and any artificial, gel, or acrylic nails if possible. Remove all jewelry including rings and body piercing jewelry.     Wear causal clothing that is easy to take on and off. Consider your type of surgery.    Keep any valuables, jewelry, piercings at home. Please bring any specially ordered equipment (sling, braces) if indicated.    Arrange for a responsible person to drive you to and from the hospital on the day of your surgery. Please confirm the visitor policy for the day of your procedure when you receive your phone call with an arrival time.     Call the surgeon's office with any new illnesses, exposures, or additional questions prior to surgery.    Please reference your “My Surgical Experience Booklet” for additional information to prepare for your upcoming surgery.

## 2025-01-27 ENCOUNTER — TELEPHONE (OUTPATIENT)
Dept: BARIATRICS | Facility: CLINIC | Age: 30
End: 2025-01-27

## 2025-01-27 LAB
COTININE SERPL-MCNC: 1.2 NG/ML
NICOTINE SERPL-MCNC: <1 NG/ML

## 2025-01-30 ENCOUNTER — CLINICAL SUPPORT (OUTPATIENT)
Dept: BARIATRICS | Facility: CLINIC | Age: 30
End: 2025-01-30

## 2025-01-30 VITALS — BODY MASS INDEX: 53.35 KG/M2 | WEIGHT: 315 LBS

## 2025-01-30 DIAGNOSIS — E66.01 MORBID OBESITY WITH BMI OF 50.0-59.9, ADULT (HCC): Primary | ICD-10-CM

## 2025-01-30 PROCEDURE — RECHECK: Performed by: DIETITIAN, REGISTERED

## 2025-01-30 NOTE — PROGRESS NOTES
Pt attended pre-op education session. Standardized packet of information for bariatric surgery was provided and reviewed with pt. Importance of lifestyle change and development of regular exercise routine stressed.   Pt. educated on two-week pre operative liquid protein liver shrinking diet.  Pt understands that the diet needs to be followed for 2 weeks prior to surgery. Handout reviewed.   Emphasized the need to drink 80 ounces of fluid per day while on the diet and to contact PCP to adjust any diabetes or blood pressure medicines prior to starting the diet.  Patient will not eat any solid food for 2 days prior to surgery, including 2 cups of non starchy vegetables to ensure that the stomach will be empty day of surgery. Reviewed Ensure pre-surgery ERAS drink instructions, protein supplement suggestions, post-operative clear liquid, full liquid, and pureed diet stages, post-operative nutrition rules and facts, and post-operative bariatric multivitamin/mineral recommendations and brand comparison. Reviewed instructions for stopping or tapering anti-obesity medications prior to surgery.      Wt (!) 183 kg (404 lb 6.4 oz)   BMI 53.35 kg/m²      Pt given the opportunity to ask questions. Questions were answered. Pt verbalized understanding of all information provided. Pt appeared prepared for upcoming surgery. Contact information provided for any questions/concerns.

## 2025-02-02 ENCOUNTER — ANESTHESIA EVENT (OUTPATIENT)
Dept: PERIOP | Facility: HOSPITAL | Age: 30
DRG: 403 | End: 2025-02-02
Payer: COMMERCIAL

## 2025-02-04 ENCOUNTER — ANESTHESIA (OUTPATIENT)
Dept: PERIOP | Facility: HOSPITAL | Age: 30
DRG: 403 | End: 2025-02-04
Payer: COMMERCIAL

## 2025-02-04 ENCOUNTER — HOSPITAL ENCOUNTER (INPATIENT)
Facility: HOSPITAL | Age: 30
LOS: 2 days | Discharge: HOME | DRG: 403 | End: 2025-02-06
Attending: SURGERY | Admitting: SURGERY
Payer: COMMERCIAL

## 2025-02-04 DIAGNOSIS — E66.9 OBESITY, UNSPECIFIED: ICD-10-CM

## 2025-02-04 DIAGNOSIS — I10 ACCELERATED HYPERTENSION: ICD-10-CM

## 2025-02-04 DIAGNOSIS — E66.01 MORBID OBESITY (HCC): ICD-10-CM

## 2025-02-04 DIAGNOSIS — E66.01 CLASS 3 SEVERE OBESITY DUE TO EXCESS CALORIES WITHOUT SERIOUS COMORBIDITY WITH BODY MASS INDEX (BMI) OF 60.0 TO 69.9 IN ADULT (HCC): ICD-10-CM

## 2025-02-04 DIAGNOSIS — E66.813 CLASS 3 SEVERE OBESITY DUE TO EXCESS CALORIES WITHOUT SERIOUS COMORBIDITY WITH BODY MASS INDEX (BMI) OF 60.0 TO 69.9 IN ADULT (HCC): ICD-10-CM

## 2025-02-04 DIAGNOSIS — G47.33 OBSTRUCTIVE SLEEP APNEA (ADULT) (PEDIATRIC): ICD-10-CM

## 2025-02-04 DIAGNOSIS — I10 PRIMARY HYPERTENSION: Primary | ICD-10-CM

## 2025-02-04 DIAGNOSIS — I10 ESSENTIAL HYPERTENSION, MALIGNANT: ICD-10-CM

## 2025-02-04 PROBLEM — Z01.810 PRE-OPERATIVE CARDIOVASCULAR EXAMINATION: Status: RESOLVED | Noted: 2024-12-05 | Resolved: 2025-02-04

## 2025-02-04 LAB
4HR DELTA HS TROPONIN: 1 NG/L
ALBUMIN SERPL BCG-MCNC: 4.2 G/DL (ref 3.5–5)
ALP SERPL-CCNC: 62 U/L (ref 34–104)
ALT SERPL W P-5'-P-CCNC: 20 U/L (ref 7–52)
ANION GAP SERPL CALCULATED.3IONS-SCNC: 8 MMOL/L (ref 4–13)
ANISOCYTOSIS BLD QL SMEAR: PRESENT
AST SERPL W P-5'-P-CCNC: 15 U/L (ref 13–39)
BASOPHILS # BLD MANUAL: 0 THOUSAND/UL (ref 0–0.1)
BASOPHILS NFR MAR MANUAL: 0 % (ref 0–1)
BILIRUB SERPL-MCNC: 0.47 MG/DL (ref 0.2–1)
BUN SERPL-MCNC: 7 MG/DL (ref 5–25)
CA-I BLD-SCNC: 1.04 MMOL/L (ref 1.12–1.32)
CALCIUM SERPL-MCNC: 8.2 MG/DL (ref 8.4–10.2)
CARDIAC TROPONIN I PNL SERPL HS: 5 NG/L (ref ?–50)
CARDIAC TROPONIN I PNL SERPL HS: 6 NG/L (ref ?–50)
CHLORIDE SERPL-SCNC: 108 MMOL/L (ref 96–108)
CO2 SERPL-SCNC: 23 MMOL/L (ref 21–32)
CREAT SERPL-MCNC: 0.61 MG/DL (ref 0.6–1.3)
EOSINOPHIL # BLD MANUAL: 0 THOUSAND/UL (ref 0–0.4)
EOSINOPHIL NFR BLD MANUAL: 0 % (ref 0–6)
ERYTHROCYTE [DISTWIDTH] IN BLOOD BY AUTOMATED COUNT: 12.9 % (ref 11.6–15.1)
GFR SERPL CREATININE-BSD FRML MDRD: 134 ML/MIN/1.73SQ M
GLUCOSE SERPL-MCNC: 114 MG/DL (ref 65–140)
HCT VFR BLD AUTO: 41.5 % (ref 36.5–49.3)
HGB BLD-MCNC: 13.8 G/DL (ref 12–17)
LACTATE SERPL-SCNC: 1.3 MMOL/L (ref 0.5–2)
LG PLATELETS BLD QL SMEAR: PRESENT
LYMPHOCYTES # BLD AUTO: 0.51 THOUSAND/UL (ref 0.6–4.47)
LYMPHOCYTES # BLD AUTO: 3 % (ref 14–44)
MCH RBC QN AUTO: 29.1 PG (ref 26.8–34.3)
MCHC RBC AUTO-ENTMCNC: 33.3 G/DL (ref 31.4–37.4)
MCV RBC AUTO: 88 FL (ref 82–98)
MONOCYTES # BLD AUTO: 0 THOUSAND/UL (ref 0–1.22)
MONOCYTES NFR BLD: 0 % (ref 4–12)
NEUTROPHILS # BLD MANUAL: 16.62 THOUSAND/UL (ref 1.85–7.62)
NEUTS BAND NFR BLD MANUAL: 1 % (ref 0–8)
NEUTS SEG NFR BLD AUTO: 96 % (ref 43–75)
PHOSPHATE SERPL-MCNC: 1.3 MG/DL (ref 2.7–4.5)
PLATELET # BLD AUTO: 340 THOUSANDS/UL (ref 149–390)
PLATELET BLD QL SMEAR: ADEQUATE
PMV BLD AUTO: 9.9 FL (ref 8.9–12.7)
POLYCHROMASIA BLD QL SMEAR: PRESENT
POTASSIUM SERPL-SCNC: 3.1 MMOL/L (ref 3.5–5.3)
PROT SERPL-MCNC: 7 G/DL (ref 6.4–8.4)
RBC # BLD AUTO: 4.74 MILLION/UL (ref 3.88–5.62)
RBC MORPH BLD: PRESENT
SODIUM SERPL-SCNC: 139 MMOL/L (ref 135–147)
WBC # BLD AUTO: 17.13 THOUSAND/UL (ref 4.31–10.16)

## 2025-02-04 PROCEDURE — 43775 LAP SLEEVE GASTRECTOMY: CPT | Performed by: SURGERY

## 2025-02-04 PROCEDURE — 85027 COMPLETE CBC AUTOMATED: CPT | Performed by: NURSE PRACTITIONER

## 2025-02-04 PROCEDURE — 99255 IP/OBS CONSLTJ NEW/EST HI 80: CPT | Performed by: INTERNAL MEDICINE

## 2025-02-04 PROCEDURE — C1781 MESH (IMPLANTABLE): HCPCS | Performed by: SURGERY

## 2025-02-04 PROCEDURE — 43775 LAP SLEEVE GASTRECTOMY: CPT | Performed by: STUDENT IN AN ORGANIZED HEALTH CARE EDUCATION/TRAINING PROGRAM

## 2025-02-04 PROCEDURE — 85007 BL SMEAR W/DIFF WBC COUNT: CPT | Performed by: NURSE PRACTITIONER

## 2025-02-04 PROCEDURE — 93005 ELECTROCARDIOGRAM TRACING: CPT

## 2025-02-04 PROCEDURE — 83605 ASSAY OF LACTIC ACID: CPT | Performed by: NURSE PRACTITIONER

## 2025-02-04 PROCEDURE — 0DJ08ZZ INSPECTION OF UPPER INTESTINAL TRACT, VIA NATURAL OR ARTIFICIAL OPENING ENDOSCOPIC: ICD-10-PCS | Performed by: SURGERY

## 2025-02-04 PROCEDURE — 80053 COMPREHEN METABOLIC PANEL: CPT | Performed by: NURSE PRACTITIONER

## 2025-02-04 PROCEDURE — 84484 ASSAY OF TROPONIN QUANT: CPT | Performed by: NURSE PRACTITIONER

## 2025-02-04 PROCEDURE — S2900 ROBOTIC SURGICAL SYSTEM: HCPCS | Performed by: SURGERY

## 2025-02-04 PROCEDURE — 88307 TISSUE EXAM BY PATHOLOGIST: CPT | Performed by: PATHOLOGY

## 2025-02-04 PROCEDURE — 0DB64Z3 EXCISION OF STOMACH, PERCUTANEOUS ENDOSCOPIC APPROACH, VERTICAL: ICD-10-PCS | Performed by: SURGERY

## 2025-02-04 PROCEDURE — 8E0W4CZ ROBOTIC ASSISTED PROCEDURE OF TRUNK REGION, PERCUTANEOUS ENDOSCOPIC APPROACH: ICD-10-PCS | Performed by: SURGERY

## 2025-02-04 PROCEDURE — 84100 ASSAY OF PHOSPHORUS: CPT | Performed by: NURSE PRACTITIONER

## 2025-02-04 PROCEDURE — 84484 ASSAY OF TROPONIN QUANT: CPT | Performed by: SURGERY

## 2025-02-04 PROCEDURE — 82330 ASSAY OF CALCIUM: CPT | Performed by: NURSE PRACTITIONER

## 2025-02-04 DEVICE — SEAMGUARD STPL REINF ENDO GIA ULTRA UNV 60 BLACK: Type: IMPLANTABLE DEVICE | Site: ABDOMEN | Status: FUNCTIONAL

## 2025-02-04 DEVICE — SEAMGUARD STPL REINF ENDO GIA ULTRA UNIV 60 PURPLE: Type: IMPLANTABLE DEVICE | Site: ABDOMEN | Status: FUNCTIONAL

## 2025-02-04 RX ORDER — PROMETHAZINE HYDROCHLORIDE 25 MG/ML
25 INJECTION, SOLUTION INTRAMUSCULAR; INTRAVENOUS EVERY 6 HOURS PRN
Status: DISCONTINUED | OUTPATIENT
Start: 2025-02-04 | End: 2025-02-06 | Stop reason: HOSPADM

## 2025-02-04 RX ORDER — HYDRALAZINE HYDROCHLORIDE 20 MG/ML
10 INJECTION INTRAMUSCULAR; INTRAVENOUS ONCE
Status: COMPLETED | OUTPATIENT
Start: 2025-02-04 | End: 2025-02-04

## 2025-02-04 RX ORDER — HYDRALAZINE HYDROCHLORIDE 20 MG/ML
20 INJECTION INTRAMUSCULAR; INTRAVENOUS ONCE
Status: COMPLETED | OUTPATIENT
Start: 2025-02-04 | End: 2025-02-04

## 2025-02-04 RX ORDER — ROCURONIUM BROMIDE 10 MG/ML
INJECTION, SOLUTION INTRAVENOUS AS NEEDED
Status: DISCONTINUED | OUTPATIENT
Start: 2025-02-04 | End: 2025-02-04

## 2025-02-04 RX ORDER — ONDANSETRON 2 MG/ML
4 INJECTION INTRAMUSCULAR; INTRAVENOUS ONCE AS NEEDED
Status: COMPLETED | OUTPATIENT
Start: 2025-02-04 | End: 2025-02-04

## 2025-02-04 RX ORDER — ACETAMINOPHEN 10 MG/ML
1000 INJECTION, SOLUTION INTRAVENOUS EVERY 6 HOURS SCHEDULED
Status: DISCONTINUED | OUTPATIENT
Start: 2025-02-04 | End: 2025-02-06 | Stop reason: HOSPADM

## 2025-02-04 RX ORDER — MORPHINE SULFATE 10 MG/ML
4 INJECTION, SOLUTION INTRAMUSCULAR; INTRAVENOUS EVERY 4 HOURS PRN
Status: DISCONTINUED | OUTPATIENT
Start: 2025-02-04 | End: 2025-02-05

## 2025-02-04 RX ORDER — LISINOPRIL 20 MG/1
20 TABLET ORAL DAILY
Status: DISCONTINUED | OUTPATIENT
Start: 2025-02-04 | End: 2025-02-05

## 2025-02-04 RX ORDER — DEXAMETHASONE SODIUM PHOSPHATE 10 MG/ML
INJECTION, SOLUTION INTRAMUSCULAR; INTRAVENOUS AS NEEDED
Status: DISCONTINUED | OUTPATIENT
Start: 2025-02-04 | End: 2025-02-04

## 2025-02-04 RX ORDER — SODIUM CHLORIDE, SODIUM LACTATE, POTASSIUM CHLORIDE, CALCIUM CHLORIDE 600; 310; 30; 20 MG/100ML; MG/100ML; MG/100ML; MG/100ML
100 INJECTION, SOLUTION INTRAVENOUS CONTINUOUS
Status: DISCONTINUED | OUTPATIENT
Start: 2025-02-04 | End: 2025-02-04

## 2025-02-04 RX ORDER — CELECOXIB 200 MG/1
200 CAPSULE ORAL ONCE
Status: COMPLETED | OUTPATIENT
Start: 2025-02-04 | End: 2025-02-04

## 2025-02-04 RX ORDER — HYDRALAZINE HYDROCHLORIDE 20 MG/ML
5 INJECTION INTRAMUSCULAR; INTRAVENOUS ONCE
Status: DISCONTINUED | OUTPATIENT
Start: 2025-02-04 | End: 2025-02-04

## 2025-02-04 RX ORDER — PROPOFOL 10 MG/ML
INJECTION, EMULSION INTRAVENOUS AS NEEDED
Status: DISCONTINUED | OUTPATIENT
Start: 2025-02-04 | End: 2025-02-04

## 2025-02-04 RX ORDER — OXYCODONE HCL 5 MG/5 ML
10 SOLUTION, ORAL ORAL EVERY 4 HOURS PRN
Status: DISCONTINUED | OUTPATIENT
Start: 2025-02-04 | End: 2025-02-06 | Stop reason: HOSPADM

## 2025-02-04 RX ORDER — MIDAZOLAM HYDROCHLORIDE 2 MG/2ML
INJECTION, SOLUTION INTRAMUSCULAR; INTRAVENOUS AS NEEDED
Status: DISCONTINUED | OUTPATIENT
Start: 2025-02-04 | End: 2025-02-04

## 2025-02-04 RX ORDER — HYDRALAZINE HYDROCHLORIDE 20 MG/ML
10 INJECTION INTRAMUSCULAR; INTRAVENOUS ONCE
Status: DISCONTINUED | OUTPATIENT
Start: 2025-02-04 | End: 2025-02-04

## 2025-02-04 RX ORDER — ENOXAPARIN SODIUM 100 MG/ML
40 INJECTION SUBCUTANEOUS EVERY 24 HOURS
Status: DISCONTINUED | OUTPATIENT
Start: 2025-02-05 | End: 2025-02-05

## 2025-02-04 RX ORDER — METOPROLOL TARTRATE 1 MG/ML
5 INJECTION, SOLUTION INTRAVENOUS ONCE
Status: COMPLETED | OUTPATIENT
Start: 2025-02-04 | End: 2025-02-04

## 2025-02-04 RX ORDER — ACETAMINOPHEN 10 MG/ML
1000 INJECTION, SOLUTION INTRAVENOUS ONCE
Status: COMPLETED | OUTPATIENT
Start: 2025-02-04 | End: 2025-02-04

## 2025-02-04 RX ORDER — FAMOTIDINE 10 MG/ML
20 INJECTION, SOLUTION INTRAVENOUS 2 TIMES DAILY
Status: DISCONTINUED | OUTPATIENT
Start: 2025-02-04 | End: 2025-02-06 | Stop reason: HOSPADM

## 2025-02-04 RX ORDER — DIPHENHYDRAMINE HCL 25 MG
25 TABLET ORAL EVERY 8 HOURS PRN
Status: DISCONTINUED | OUTPATIENT
Start: 2025-02-04 | End: 2025-02-06 | Stop reason: HOSPADM

## 2025-02-04 RX ORDER — CEFAZOLIN SODIUM 2 G/50ML
2000 SOLUTION INTRAVENOUS EVERY 8 HOURS
Status: COMPLETED | OUTPATIENT
Start: 2025-02-04 | End: 2025-02-05

## 2025-02-04 RX ORDER — HYDROMORPHONE HCL/PF 1 MG/ML
0.5 SYRINGE (ML) INJECTION
Status: COMPLETED | OUTPATIENT
Start: 2025-02-04 | End: 2025-02-04

## 2025-02-04 RX ORDER — LABETALOL HYDROCHLORIDE 5 MG/ML
10 INJECTION, SOLUTION INTRAVENOUS ONCE
Status: COMPLETED | OUTPATIENT
Start: 2025-02-04 | End: 2025-02-04

## 2025-02-04 RX ORDER — KETOROLAC TROMETHAMINE 30 MG/ML
15 INJECTION, SOLUTION INTRAMUSCULAR; INTRAVENOUS EVERY 6 HOURS SCHEDULED
Status: DISCONTINUED | OUTPATIENT
Start: 2025-02-04 | End: 2025-02-06 | Stop reason: HOSPADM

## 2025-02-04 RX ORDER — HYDROMORPHONE HCL/PF 1 MG/ML
SYRINGE (ML) INJECTION AS NEEDED
Status: DISCONTINUED | OUTPATIENT
Start: 2025-02-04 | End: 2025-02-04

## 2025-02-04 RX ORDER — METOCLOPRAMIDE HYDROCHLORIDE 5 MG/ML
10 INJECTION INTRAMUSCULAR; INTRAVENOUS EVERY 6 HOURS PRN
Status: DISCONTINUED | OUTPATIENT
Start: 2025-02-04 | End: 2025-02-06 | Stop reason: HOSPADM

## 2025-02-04 RX ORDER — ONDANSETRON 2 MG/ML
INJECTION INTRAMUSCULAR; INTRAVENOUS AS NEEDED
Status: DISCONTINUED | OUTPATIENT
Start: 2025-02-04 | End: 2025-02-04

## 2025-02-04 RX ORDER — HYDRALAZINE HYDROCHLORIDE 20 MG/ML
5 INJECTION INTRAMUSCULAR; INTRAVENOUS
Status: DISCONTINUED | OUTPATIENT
Start: 2025-02-04 | End: 2025-02-04

## 2025-02-04 RX ORDER — SODIUM CHLORIDE, SODIUM LACTATE, POTASSIUM CHLORIDE, CALCIUM CHLORIDE 600; 310; 30; 20 MG/100ML; MG/100ML; MG/100ML; MG/100ML
125 INJECTION, SOLUTION INTRAVENOUS CONTINUOUS
Status: DISCONTINUED | OUTPATIENT
Start: 2025-02-04 | End: 2025-02-04

## 2025-02-04 RX ORDER — LISINOPRIL 20 MG/1
20 TABLET ORAL DAILY
Status: DISCONTINUED | OUTPATIENT
Start: 2025-02-05 | End: 2025-02-04

## 2025-02-04 RX ORDER — LIDOCAINE HYDROCHLORIDE 20 MG/ML
INJECTION, SOLUTION EPIDURAL; INFILTRATION; INTRACAUDAL; PERINEURAL AS NEEDED
Status: DISCONTINUED | OUTPATIENT
Start: 2025-02-04 | End: 2025-02-04

## 2025-02-04 RX ORDER — ONDANSETRON 2 MG/ML
4 INJECTION INTRAMUSCULAR; INTRAVENOUS EVERY 6 HOURS PRN
Status: DISCONTINUED | OUTPATIENT
Start: 2025-02-04 | End: 2025-02-06 | Stop reason: HOSPADM

## 2025-02-04 RX ORDER — SUCCINYLCHOLINE/SOD CL,ISO/PF 100 MG/5ML
SYRINGE (ML) INTRAVENOUS AS NEEDED
Status: DISCONTINUED | OUTPATIENT
Start: 2025-02-04 | End: 2025-02-04

## 2025-02-04 RX ORDER — OXYCODONE HCL 5 MG/5 ML
5 SOLUTION, ORAL ORAL EVERY 4 HOURS PRN
Status: DISCONTINUED | OUTPATIENT
Start: 2025-02-04 | End: 2025-02-06 | Stop reason: HOSPADM

## 2025-02-04 RX ORDER — FENTANYL CITRATE/PF 50 MCG/ML
25 SYRINGE (ML) INJECTION
Status: DISCONTINUED | OUTPATIENT
Start: 2025-02-04 | End: 2025-02-04 | Stop reason: HOSPADM

## 2025-02-04 RX ORDER — KETAMINE HCL IN NACL, ISO-OSM 100MG/10ML
SYRINGE (ML) INJECTION AS NEEDED
Status: DISCONTINUED | OUTPATIENT
Start: 2025-02-04 | End: 2025-02-04

## 2025-02-04 RX ORDER — FENTANYL CITRATE 50 UG/ML
INJECTION, SOLUTION INTRAMUSCULAR; INTRAVENOUS AS NEEDED
Status: DISCONTINUED | OUTPATIENT
Start: 2025-02-04 | End: 2025-02-04

## 2025-02-04 RX ORDER — SIMETHICONE 80 MG
80 TABLET,CHEWABLE ORAL 4 TIMES DAILY PRN
Status: DISCONTINUED | OUTPATIENT
Start: 2025-02-04 | End: 2025-02-06 | Stop reason: HOSPADM

## 2025-02-04 RX ORDER — MEPERIDINE HYDROCHLORIDE 25 MG/ML
12.5 INJECTION INTRAMUSCULAR; INTRAVENOUS; SUBCUTANEOUS
Status: DISCONTINUED | OUTPATIENT
Start: 2025-02-04 | End: 2025-02-04 | Stop reason: HOSPADM

## 2025-02-04 RX ORDER — PROPOFOL 10 MG/ML
INJECTION, EMULSION INTRAVENOUS CONTINUOUS PRN
Status: DISCONTINUED | OUTPATIENT
Start: 2025-02-04 | End: 2025-02-04

## 2025-02-04 RX ORDER — HYDRALAZINE HYDROCHLORIDE 20 MG/ML
10 INJECTION INTRAMUSCULAR; INTRAVENOUS
Status: COMPLETED | OUTPATIENT
Start: 2025-02-04 | End: 2025-02-04

## 2025-02-04 RX ORDER — BUPIVACAINE HYDROCHLORIDE 5 MG/ML
INJECTION, SOLUTION EPIDURAL; INTRACAUDAL AS NEEDED
Status: DISCONTINUED | OUTPATIENT
Start: 2025-02-04 | End: 2025-02-04 | Stop reason: HOSPADM

## 2025-02-04 RX ORDER — ENOXAPARIN SODIUM 100 MG/ML
60 INJECTION SUBCUTANEOUS ONCE
Status: COMPLETED | OUTPATIENT
Start: 2025-02-04 | End: 2025-02-04

## 2025-02-04 RX ADMIN — HYDRALAZINE HYDROCHLORIDE 5 MG: 20 INJECTION, SOLUTION INTRAMUSCULAR; INTRAVENOUS at 14:51

## 2025-02-04 RX ADMIN — HYDRALAZINE HYDROCHLORIDE 10 MG: 20 INJECTION, SOLUTION INTRAMUSCULAR; INTRAVENOUS at 13:36

## 2025-02-04 RX ADMIN — HYDRALAZINE HYDROCHLORIDE 20 MG: 20 INJECTION, SOLUTION INTRAMUSCULAR; INTRAVENOUS at 15:35

## 2025-02-04 RX ADMIN — TRIMETHOBENZAMIDE HYDROCHLORIDE 200 MG: 100 INJECTION INTRAMUSCULAR at 12:46

## 2025-02-04 RX ADMIN — HYDRALAZINE HYDROCHLORIDE 10 MG: 20 INJECTION, SOLUTION INTRAMUSCULAR; INTRAVENOUS at 14:21

## 2025-02-04 RX ADMIN — Medication 50 MG: at 10:12

## 2025-02-04 RX ADMIN — ONDANSETRON 4 MG: 2 INJECTION INTRAMUSCULAR; INTRAVENOUS at 12:15

## 2025-02-04 RX ADMIN — ACETAMINOPHEN 1000 MG: 10 INJECTION INTRAVENOUS at 23:54

## 2025-02-04 RX ADMIN — SODIUM CHLORIDE, SODIUM LACTATE, POTASSIUM CHLORIDE, AND CALCIUM CHLORIDE: .6; .31; .03; .02 INJECTION, SOLUTION INTRAVENOUS at 11:35

## 2025-02-04 RX ADMIN — MIDAZOLAM 2 MG: 1 INJECTION INTRAMUSCULAR; INTRAVENOUS at 10:10

## 2025-02-04 RX ADMIN — FOSAPREPITANT DIMEGLUMINE 150 MG: 150 INJECTION, POWDER, LYOPHILIZED, FOR SOLUTION INTRAVENOUS at 08:15

## 2025-02-04 RX ADMIN — LABETALOL HYDROCHLORIDE 10 MG: 5 INJECTION, SOLUTION INTRAVENOUS at 15:03

## 2025-02-04 RX ADMIN — SUGAMMADEX 100 MG: 100 INJECTION, SOLUTION INTRAVENOUS at 11:40

## 2025-02-04 RX ADMIN — CEFAZOLIN 3000 MG: 10 INJECTION, POWDER, FOR SOLUTION INTRAVENOUS at 10:25

## 2025-02-04 RX ADMIN — HYDRALAZINE HYDROCHLORIDE 5 MG: 20 INJECTION, SOLUTION INTRAMUSCULAR; INTRAVENOUS at 15:19

## 2025-02-04 RX ADMIN — NICARDIPINE HYDROCHLORIDE 15 MG/HR: 2.5 INJECTION, SOLUTION INTRAVENOUS at 19:08

## 2025-02-04 RX ADMIN — SUGAMMADEX 100 MG: 100 INJECTION, SOLUTION INTRAVENOUS at 11:38

## 2025-02-04 RX ADMIN — PROPOFOL 130 MCG/KG/MIN: 10 INJECTION, EMULSION INTRAVENOUS at 10:16

## 2025-02-04 RX ADMIN — SUGAMMADEX 100 MG: 100 INJECTION, SOLUTION INTRAVENOUS at 11:42

## 2025-02-04 RX ADMIN — HYDROMORPHONE HYDROCHLORIDE 0.5 MG: 1 INJECTION, SOLUTION INTRAMUSCULAR; INTRAVENOUS; SUBCUTANEOUS at 10:18

## 2025-02-04 RX ADMIN — SUGAMMADEX 100 MG: 100 INJECTION, SOLUTION INTRAVENOUS at 11:35

## 2025-02-04 RX ADMIN — NICARDIPINE HYDROCHLORIDE 15 MG/HR: 2.5 INJECTION, SOLUTION INTRAVENOUS at 21:13

## 2025-02-04 RX ADMIN — DIPHENHYDRAMINE HYDROCHLORIDE 25 MG: 25 TABLET ORAL at 21:35

## 2025-02-04 RX ADMIN — NICARDIPINE HYDROCHLORIDE 15 MG/HR: 2.5 INJECTION, SOLUTION INTRAVENOUS at 23:07

## 2025-02-04 RX ADMIN — CELECOXIB 200 MG: 100 CAPSULE ORAL at 08:15

## 2025-02-04 RX ADMIN — FAMOTIDINE 20 MG: 10 INJECTION, SOLUTION INTRAVENOUS at 21:24

## 2025-02-04 RX ADMIN — HYDROMORPHONE HYDROCHLORIDE 0.5 MG: 1 INJECTION, SOLUTION INTRAMUSCULAR; INTRAVENOUS; SUBCUTANEOUS at 13:42

## 2025-02-04 RX ADMIN — HYDROMORPHONE HYDROCHLORIDE 0.5 MG: 1 INJECTION, SOLUTION INTRAMUSCULAR; INTRAVENOUS; SUBCUTANEOUS at 13:05

## 2025-02-04 RX ADMIN — FENTANYL CITRATE 25 MCG: 50 INJECTION INTRAMUSCULAR; INTRAVENOUS at 12:48

## 2025-02-04 RX ADMIN — ROCURONIUM 50 MG: 50 INJECTION, SOLUTION INTRAVENOUS at 10:20

## 2025-02-04 RX ADMIN — ONDANSETRON 4 MG: 2 INJECTION INTRAMUSCULAR; INTRAVENOUS at 11:15

## 2025-02-04 RX ADMIN — LISINOPRIL 20 MG: 20 TABLET ORAL at 19:43

## 2025-02-04 RX ADMIN — HYDRALAZINE HYDROCHLORIDE 10 MG: 20 INJECTION INTRAMUSCULAR; INTRAVENOUS at 22:10

## 2025-02-04 RX ADMIN — HYDROMORPHONE HYDROCHLORIDE 0.5 MG: 1 INJECTION, SOLUTION INTRAMUSCULAR; INTRAVENOUS; SUBCUTANEOUS at 13:19

## 2025-02-04 RX ADMIN — ACETAMINOPHEN 1000 MG: 10 INJECTION INTRAVENOUS at 11:36

## 2025-02-04 RX ADMIN — ACETAMINOPHEN 1000 MG: 10 INJECTION INTRAVENOUS at 18:07

## 2025-02-04 RX ADMIN — CEFAZOLIN SODIUM 2000 MG: 2 SOLUTION INTRAVENOUS at 18:07

## 2025-02-04 RX ADMIN — LIDOCAINE HYDROCHLORIDE 100 MG: 20 INJECTION, SOLUTION EPIDURAL; INFILTRATION; INTRACAUDAL at 10:12

## 2025-02-04 RX ADMIN — NICARDIPINE HYDROCHLORIDE 7.5 MG/HR: 2.5 INJECTION, SOLUTION INTRAVENOUS at 16:46

## 2025-02-04 RX ADMIN — FENTANYL CITRATE 25 MCG: 50 INJECTION INTRAMUSCULAR; INTRAVENOUS at 12:34

## 2025-02-04 RX ADMIN — FENTANYL CITRATE 25 MCG: 50 INJECTION INTRAMUSCULAR; INTRAVENOUS at 12:39

## 2025-02-04 RX ADMIN — SODIUM CHLORIDE, SODIUM LACTATE, POTASSIUM CHLORIDE, AND CALCIUM CHLORIDE: .6; .31; .03; .02 INJECTION, SOLUTION INTRAVENOUS at 10:05

## 2025-02-04 RX ADMIN — KETOROLAC TROMETHAMINE 15 MG: 30 INJECTION, SOLUTION INTRAMUSCULAR; INTRAVENOUS at 21:25

## 2025-02-04 RX ADMIN — HYDROMORPHONE HYDROCHLORIDE 0.5 MG: 1 INJECTION, SOLUTION INTRAMUSCULAR; INTRAVENOUS; SUBCUTANEOUS at 10:54

## 2025-02-04 RX ADMIN — FENTANYL CITRATE 100 MCG: 50 INJECTION INTRAMUSCULAR; INTRAVENOUS at 10:12

## 2025-02-04 RX ADMIN — Medication 100 MG: at 10:13

## 2025-02-04 RX ADMIN — DEXAMETHASONE SODIUM PHOSPHATE 10 MG: 10 INJECTION INTRAMUSCULAR; INTRAVENOUS at 10:12

## 2025-02-04 RX ADMIN — PROPOFOL 300 MG: 10 INJECTION, EMULSION INTRAVENOUS at 10:12

## 2025-02-04 RX ADMIN — HYDRALAZINE HYDROCHLORIDE 5 MG: 20 INJECTION, SOLUTION INTRAMUSCULAR; INTRAVENOUS at 14:36

## 2025-02-04 RX ADMIN — HYDROMORPHONE HYDROCHLORIDE 0.5 MG: 1 INJECTION, SOLUTION INTRAMUSCULAR; INTRAVENOUS; SUBCUTANEOUS at 12:56

## 2025-02-04 RX ADMIN — LABETALOL HYDROCHLORIDE 10 MG: 5 INJECTION, SOLUTION INTRAVENOUS at 13:56

## 2025-02-04 RX ADMIN — METOROPROLOL TARTRATE 5 MG: 5 INJECTION, SOLUTION INTRAVENOUS at 23:50

## 2025-02-04 RX ADMIN — ENOXAPARIN SODIUM 60 MG: 60 INJECTION SUBCUTANEOUS at 08:15

## 2025-02-04 RX ADMIN — KETOROLAC TROMETHAMINE 15 MG: 30 INJECTION, SOLUTION INTRAMUSCULAR; INTRAVENOUS at 15:48

## 2025-02-04 NOTE — ASSESSMENT & PLAN NOTE
Patient is status post gastrectomy sleeve robotically assisted with intraoperative EGD, postop day 0  Postoperative care per bariatric service  Continue pain control

## 2025-02-04 NOTE — INTERVAL H&P NOTE
H&P reviewed. After examining the patient I find no changes in the patients condition since the H&P had been written.    Vitals:    02/04/25 0804   BP: 132/62   Pulse: 96   Resp: 19   Temp: 98.7 °F (37.1 °C)   SpO2: 99%

## 2025-02-04 NOTE — ASSESSMENT & PLAN NOTE
Patient received 400 mics of phenylephrine at approximately 1130 today  Patient also had IV fluids running at 125 an hour preprocedure and 100 an hour postprocedure.  Net +1.1 L  We will hold IV fluids  We will continue Cardene drip  Pain control as outlined prior primary service  Monitor hemodynamics closely  Check EKG, check troponins,  Will check CBC, BMP, mag, Phos, iCal.

## 2025-02-04 NOTE — CONSULTS
Consultation - Critical Care/ICU   Name: Josep Cooper 29 y.o. male I MRN: [id number]  Unit/Bed#: OR POOL I Date of Admission: 2/4/2025   Date of Service: 2/4/2025 I Hospital Day: 0   Inpatient consult to Surgical Critical Care  Consult performed by: MAXIMUS Vanegas  Consult ordered by: Matt Garcia MD        Physician Requesting Evaluation: Raul Knutson MD   Reason for Evaluation / Principal Problem: Elevated hypertension        Assessment & Plan  Accelerated hypertension  Patient received 400 mics of phenylephrine at approximately 1130 today  Patient also had IV fluids running at 125 an hour preprocedure and 100 an hour postprocedure.  Net +1.1 L  We will hold IV fluids  We will continue Cardene drip  Pain control as outlined prior primary service  Monitor hemodynamics closely  Check EKG, check troponins,  Will check CBC, BMP, mag, Phos, iCal.  Class 3 severe obesity due to excess calories with body mass index (BMI) of 60.0 to 69.9 in adult (HCC)  Patient is status post gastrectomy sleeve robotically assisted with intraoperative EGD, postop day 0  Postoperative care per bariatric service  Continue pain control  STACI (obstructive sleep apnea)  Monitor respiratory status closely  BiPAP as needed  Disposition: Stepdown Level 1    History of Present Illness   Josep Cooper is a 29 y.o. who presents with a longstanding history of morbid obesity and hypertension.  He reportedly stopped taking his lisinopril as an outpatient due to improved blood pressure.  He was found to be a good candidate to undergo bariatric surgery and was enrolled at the [facility] weight management center.  On 2/4/2025 he underwent a gastrectomy sleeve robotically assisted with intraoperative EGD.  Preoperatively his blood pressure was in the 130s systolically.  Postoperatively his blood pressure increased to systolics between 160 and 220.  He received several doses of hydralazine and labetalol without improvement in his  hypertension.  We are now consulted for medical management and ICU level care monitoring secondary to accelerated hypertension.    History obtained from chart review and the patient.  Review of Systems: Review of Systems   Constitutional: Negative.    HENT: Negative.     Eyes: Negative.    Respiratory: Negative.     Cardiovascular: Negative.    Gastrointestinal: Negative.    Endocrine: Negative.    Genitourinary: Negative.    Musculoskeletal: Negative.    Skin: Negative.    Allergic/Immunologic: Negative.    Neurological: Negative.    Hematological: Negative.    Psychiatric/Behavioral: Negative.         Historical Information   Past Medical History:  2017: Concussion with loss of consciousness      Comment:  Last Assessment & Plan: Formatting of this note might be               different from the original. Requesting records from                [facility]  No date: CPAP (continuous positive airway pressure) dependence  No date: Hypertension  No date: Sleep apnea Past Surgical History:  No date: EGD   Current Outpatient Medications   Medication Instructions    Cyanocobalamin (VITAMIN B 12 PO) 2 tablets, Daily    lisinopril (ZESTRIL) 10 mg, Oral, Daily    Magnesium 250 MG CAPS 2 capsules, Daily    Multiple Vitamins-Minerals (MENS MULTIVITAMIN PO) 1 tablet, Daily    Omega-3 Fatty Acids (OMEGA 3 PO) 2 tablets, Daily    [START ON 2025] omeprazole (PRILOSEC) 20 mg, Oral, Daily    VITAMIN D, CHOLECALCIFEROL, PO 1 tablet, Daily    Zepbound 10 mg, Subcutaneous, Weekly    Allergies   Allergen Reactions    Tuberculin Ppd Other (See Comments)     Other reaction(s): Positive skin tests in past      Social History     Tobacco Use    Smoking status: Former     Current packs/day: 0.00     Average packs/day: 0.3 packs/day for 11.7 years (2.9 ttl pk-yrs)     Types: Cigarettes     Start date: 2012     Quit date: 2023     Years since quittin.4    Smokeless tobacco: Never   Vaping Use    Vaping status:  Former   Substance Use Topics    Alcohol use: Never    Drug use: Never    Family History   Problem Relation Age of Onset    Diabetes Paternal Grandfather           Objective :                   Vitals I/O      Most Recent Min/Max in 24hrs   Temp 98.2 °F (36.8 °C) Temp  Min: 98.2 °F (36.8 °C)  Max: 98.7 °F (37.1 °C)   Pulse 98 Pulse  Min: 64  Max: 98   Resp 18 Resp  Min: 16  Max: 27   BP (!) 217/90 BP  Min: 132/62  Max: 217/90   O2 Sat 98 % SpO2  Min: 92 %  Max: 100 %      Intake/Output Summary (Last 24 hours) at 2/4/2025 1651  Last data filed at 2/4/2025 1517  Gross per 24 hour   Intake 1800 ml   Output 700 ml   Net 1100 ml       Diet NPO  Diet Bariatric; Bariatric Clear Liquid    Invasive Monitoring           Physical Exam   Physical Exam  Eyes:      Extraocular Movements: Extraocular movements intact.      Pupils: Pupils are equal, round, and reactive to light.   Skin:     General: Skin is warm and dry.   HENT:      Head: Normocephalic and atraumatic.      Nose: No congestion.      Mouth/Throat:      Mouth: Mucous membranes are moist.   Neck:      Vascular: No JVD.   Cardiovascular:      Rate and Rhythm: Normal rate and regular rhythm.      Pulses: Normal pulses.   Musculoskeletal:         General: Normal range of motion.   Abdominal:      Palpations: Abdomen is soft.      Tenderness: There is no abdominal tenderness.   Constitutional:       General: He is not in acute distress.     Appearance: He is well-developed and well-nourished. He is not ill-appearing or toxic-appearing.   Pulmonary:      Effort: Pulmonary effort is normal.      Breath sounds: Normal breath sounds.   Neurological:      General: No focal deficit present.      Mental Status: He is alert and oriented to person, place and time. He is calm.      Motor: Strength full and intact in all extremities.          Diagnostic Studies        Lab Results: I have reviewed the following results:     Medications:  Scheduled PRN   acetaminophen, 1,000 mg, Q6H  ASDI  cefazolin, 2,000 mg, Q8H  [START ON 2/5/2025] enoxaparin, 40 mg, Q24H  famotidine, 20 mg, BID  ketorolac, 15 mg, Q6H SADI      diphenhydrAMINE, 25 mg, Q8H PRN  fentaNYL, 25 mcg, Q5 Min PRN  hydrALAZINE, 5 mg, Q15 Min PRN  meperidine, 12.5 mg, Q10 Min PRN  metoclopramide, 10 mg, Q6H PRN  morphine injection, 4 mg, Q4H PRN  ondansetron, 4 mg, Q6H PRN  oxyCODONE, 10 mg, Q4H PRN  oxyCODONE, 5 mg, Q4H PRN  phenol, 2 spray, Q2H PRN  promethazine, 25 mg, Q6H PRN  simethicone, 80 mg, 4x Daily PRN  trimethobenzamide, 200 mg, Q6H PRN       Continuous    niCARdipine, 1-15 mg/hr         Labs:   CBC    No recent results  BMP    No recent results    Coags    No recent results     Additional Electrolytes  No recent results       Blood Gas    No recent results  No recent results LFTs  No recent results    Infectious  No recent results  Glucose  No recent results

## 2025-02-04 NOTE — ANESTHESIA PREPROCEDURE EVALUATION
Procedure:  GASTRECTOMY  SLEEVE ROBOTICALLY ASSISTED W/ INTRAOPERATIVE EGD (Abdomen)    Relevant Problems   CARDIO   (+) Primary hypertension      PULMONARY   (+) STACI (obstructive sleep apnea)        Physical Exam    Airway    Mallampati score: III         Dental   No notable dental hx     Cardiovascular  Cardiovascular exam normal    Pulmonary  Pulmonary exam normal     Other Findings        Anesthesia Plan  ASA Score- 3     Anesthesia Type- general with ASA Monitors.         Additional Monitors:     Airway Plan:            Plan Factors-Exercise tolerance (METS): >4 METS.    Chart reviewed. EKG reviewed.  Existing labs reviewed. Patient summary reviewed.    Patient is not a current smoker.              Induction- intravenous.    Postoperative Plan- Plan for postoperative opioid use. Planned trial extubation    Perioperative Resuscitation Plan - Level 1 - Full Code.       Informed Consent- Anesthetic plan and risks discussed with patient.  I personally reviewed this patient with the CRNA. Discussed and agreed on the Anesthesia Plan with the CRNA..

## 2025-02-04 NOTE — OP NOTE
[facility]   [address] [place] [address], [address], [address] [phone] (Fax)      Operative Report  GASTRECTOMY  SLEEVE ROBOTICALLY ASSISTED W/ INTRAOPERATIVE EGD     Patient Name: Josep Cooper    :  1995  MRN: [id number]  Patient Location: [facility] OR ROOM 07  Surgery Date : 2025  Surgeons:  Surgeons and Role:     * Raul Knutson MD - Primary     * Matt Garcia MD - Assisting    Diagnosis:    Pre-Op Diagnosis Codes:  Morbid obesity (HCC) [E66.01]  Body mass index is 52.85 kg/m².  Obstructive sleep apnea (adult) (pediatric) [G47.33]  Essential hypertension, malignant [I10]    Post-Op Diagnosis Codes:     * Morbid obesity (HCC) [E66.01]     * Obstructive sleep apnea (adult) (pediatric) [G47.33]     * Essential hypertension, malignant [I10]     * Body mass index is 52.85 kg/m².    Procedure  Intraoperative Endoscopy  Laparoscopic Robotically Assisted Sleeve Gastrectomy    Specimen(s):  ID Type Source Tests Collected by Time Destination   1 : PORTION OF STOMACH Tissue Stomach TISSUE EXAM Raul Knutson MD 2025 1100        Estimated Blood Loss:    20 mL * No LDAs found *    Anesthesia Type:     General    Operative Indications:    Morbid obesity (HCC) [E66.01]  Obstructive sleep apnea (adult) (pediatric) [G47.33]  Essential hypertension, malignant [I10]  Body mass index is 52.85 kg/m².      Operative Findings:    Hepatomegaly with a prominent left lobe of the liver    Complications:     None    Procedure and Technique:    INDICATION    Josep Cooper is a 29 y.o. male with a Body mass index is 52.85 kg/m². and a long standing history of morbid obesity and inability to lose a significant amount of weight on its own.  This patient was found to be a good candidate to undergo a bariatric procedure upon being enrolled here at the [facility].    OPERATIVE TECHNIQUE    The patient was taken to the operating room and placed in a supine position.  A dose of IV antibiotic prophylaxis that consisted of Ancef 3g was given.   Also 40 mg of subcutaneous Enoxaparin to prevent deep vein thrombosis were administered.  Sequential compression devices were placed on both lower extremities.    After satisfactory general anesthesia induction and endotracheal intubation was achieved, the extremities were placed and properly secured to prevent neuromuscular damage as best as possible.      Subsequently, the abdominal wall was prepped and draped in a surgical standard sterile fashion. After a timeout was done and the patient was properly identified and the type of procedure was confirmed  access to the peritoneal cavity was gained with standard Veress needle technique and an optical trocar. With this device, we were able to visualize the layers of the abdominal wall, and enter the peritoneal cavity under direct visualization. Pneumoperitoneum was then established with CO2 insufflation.     A four quadrant transversus abdominis plane block was performed under direct laparoscopic vision.    After this was completed four additional trocars were placed: an 8 mm in the right flank in the anterior axillary line, a 12-mm port was placed in the right flank midclavicular line, a 8-mm port was placed in the left flank  midclavicular line and another 12-mm port was placed in the left flank anterior axillary line lateral to the supraumbilical port.    The Piedad liver retractor was placed in the subxiphoid position through the use of a 5-mm trocar incision.  The left lobe of the liver was fairly large and prominent and he required us to reposition the Piedad a couple of times to obtain good visualization.  The patient was repositioned to a reverse Trendelenburg position and the robot was docked    With the trocars in place, the dissection was begun. We divided the gastrocolic ligament with the energy device to enter the lesser sac. We continued to divide this ligament along the  greater curvature of the stomach towards the angle of His. Special care was taken while dividing the short gastric vessels close to the spleen. This process was completely hemostatic.    We then turned to the creation of an elongated and thin gastric pouch. A 36 French calibration tube was placed by the anesthesia staff into the stomach under our laparoscopic surveillance. Once the tip of the bougie was confirmed to be next to the pylorus, serial firings of the laparoscopic stapler with 60-mm cartridges were utilized. We started in a point inferior to the incisura angularis and the Crows foot nerve looking to preserve the gastric emptying. This was 5 to 6 centimeters proximal to the pylorus.    We created a pouch based on the lesser curve, and in vertical orientation. We continued the vertical serial firings of the stapler to the angle of His gently cinching the bougie with our laparoscopic stapler looking to create a thin pouch. As we approached the fundus of the stomach and the angle of His, the stapler loads were changed appropriately according to the variable thickness of the tissue and were reinforced with buttressing material as needed.    This completely  the pouch from the remnant stomach.     We then turned our attention to the newly created pouch and examined it for bleeding or obvious defects on the staple lines and none were found.    The distal stomach pouch was occluded and an EGD as well as an air insufflation test was performed. Neither intraoperative bleeding nor leaks were detected.     I then covered the most proximal aspect of the staple line with a tongue of omentum in a [place] patch fashion and secured it in place with a single 2/0 Vicryl stitch.    The robot was undocked and the 12 mm right flank trocar site was dilated and the gastric remnant was externalized through it and passed off the surgical field to be sent to pathology.      The sponge, needle and instrument count was  reported complete.    The previously dilated trocar site was then closed with use of a suture closure device and a figure-of-eight with absorbable suture.  The pneumoperitoneum was evacuated using the [place] filter and smoke evacuator. The liver retractor and the remainder ports were then removed under direct laparoscopic visualization and no back bleeding was noted.     The skin incisions were all closed with 4-0 absorbable subcuticular suture.     The patient tolerated the procedure well, was extubated uneventfully and was transferred to the recovery room in stable condition.     I was present for the entire length of the procedure as the attending of record.  No qualified resident was available to assist.  The presence of an assistant was necessary for camera holding, traction and counter traction and for help with suturing and stapling in addition to performing the intraop-EGD.    Patient Disposition:    PACU     Signature: Raul Knutson MD  Date: February 4, 2025  Time: 11:40 AM

## 2025-02-04 NOTE — ANESTHESIA POSTPROCEDURE EVALUATION
Post-Op Assessment Note    CV Status:  Stable  Pain Score: 0    Pain management: adequate       Mental Status:  Alert and awake   Hydration Status:  Euvolemic   PONV Controlled:  Controlled   Airway Patency:  Patent     Post Op Vitals Reviewed: Yes    No anethesia notable event occurred.    Staff: Anesthesiologist, CRNA   Comments: Report given to recovering RN, VSS, Pt states he is comfortable.        Last Filed PACU Vitals:  Vitals Value Taken Time   Temp 98.2 °F (36.8 °C) 02/04/25 1208   Pulse 91 02/04/25 1208   /80 02/04/25 1208   Resp 16 02/04/25 1208   SpO2 96 % 02/04/25 1208       Modified Yadira:     Vitals Value Taken Time   Activity 2 02/04/25 1200   Respiration 2 02/04/25 1200   Circulation 1 02/04/25 1200   Consciousness 1 02/04/25 1200   Oxygen Saturation 1 02/04/25 1200     Modified Yadira Score: 7

## 2025-02-05 LAB
ANION GAP SERPL CALCULATED.3IONS-SCNC: 8 MMOL/L (ref 4–13)
BUN SERPL-MCNC: 7 MG/DL (ref 5–25)
CALCIUM SERPL-MCNC: 8.5 MG/DL (ref 8.4–10.2)
CARDIAC TROPONIN I PNL SERPL HS: 7 NG/L (ref 8–18)
CHLORIDE SERPL-SCNC: 110 MMOL/L (ref 96–108)
CO2 SERPL-SCNC: 22 MMOL/L (ref 21–32)
CREAT SERPL-MCNC: 0.58 MG/DL (ref 0.6–1.3)
ERYTHROCYTE [DISTWIDTH] IN BLOOD BY AUTOMATED COUNT: 13.1 % (ref 11.6–15.1)
GFR SERPL CREATININE-BSD FRML MDRD: 137 ML/MIN/1.73SQ M
GLUCOSE SERPL-MCNC: 118 MG/DL (ref 65–140)
HCT VFR BLD AUTO: 41.1 % (ref 36.5–49.3)
HGB BLD-MCNC: 13.8 G/DL (ref 12–17)
MAGNESIUM SERPL-MCNC: 1.8 MG/DL (ref 1.9–2.7)
MCH RBC QN AUTO: 29.6 PG (ref 26.8–34.3)
MCHC RBC AUTO-ENTMCNC: 33.6 G/DL (ref 31.4–37.4)
MCV RBC AUTO: 88 FL (ref 82–98)
PLATELET # BLD AUTO: 384 THOUSANDS/UL (ref 149–390)
PMV BLD AUTO: 9.8 FL (ref 8.9–12.7)
POTASSIUM SERPL-SCNC: 3.4 MMOL/L (ref 3.5–5.3)
RBC # BLD AUTO: 4.67 MILLION/UL (ref 3.88–5.62)
SODIUM SERPL-SCNC: 140 MMOL/L (ref 135–147)
WBC # BLD AUTO: 16.38 THOUSAND/UL (ref 4.31–10.16)

## 2025-02-05 PROCEDURE — 99232 SBSQ HOSP IP/OBS MODERATE 35: CPT | Performed by: INTERNAL MEDICINE

## 2025-02-05 PROCEDURE — 80048 BASIC METABOLIC PNL TOTAL CA: CPT | Performed by: STUDENT IN AN ORGANIZED HEALTH CARE EDUCATION/TRAINING PROGRAM

## 2025-02-05 PROCEDURE — 83735 ASSAY OF MAGNESIUM: CPT | Performed by: NURSE PRACTITIONER

## 2025-02-05 PROCEDURE — 99024 POSTOP FOLLOW-UP VISIT: CPT | Performed by: STUDENT IN AN ORGANIZED HEALTH CARE EDUCATION/TRAINING PROGRAM

## 2025-02-05 PROCEDURE — 85027 COMPLETE CBC AUTOMATED: CPT | Performed by: STUDENT IN AN ORGANIZED HEALTH CARE EDUCATION/TRAINING PROGRAM

## 2025-02-05 RX ORDER — METOPROLOL TARTRATE 25 MG/1
25 TABLET, FILM COATED ORAL EVERY 6 HOURS
Status: DISCONTINUED | OUTPATIENT
Start: 2025-02-05 | End: 2025-02-05

## 2025-02-05 RX ORDER — METOPROLOL TARTRATE 1 MG/ML
10 INJECTION, SOLUTION INTRAVENOUS ONCE
Status: COMPLETED | OUTPATIENT
Start: 2025-02-05 | End: 2025-02-05

## 2025-02-05 RX ORDER — HYDROCHLOROTHIAZIDE 25 MG/1
25 TABLET ORAL DAILY
Status: DISCONTINUED | OUTPATIENT
Start: 2025-02-05 | End: 2025-02-06 | Stop reason: HOSPADM

## 2025-02-05 RX ORDER — HYDROCHLOROTHIAZIDE 25 MG/1
25 TABLET ORAL DAILY
Status: DISCONTINUED | OUTPATIENT
Start: 2025-02-05 | End: 2025-02-05

## 2025-02-05 RX ORDER — POTASSIUM CHLORIDE 20MEQ/15ML
40 LIQUID (ML) ORAL ONCE
Status: COMPLETED | OUTPATIENT
Start: 2025-02-05 | End: 2025-02-05

## 2025-02-05 RX ORDER — LISINOPRIL 20 MG/1
40 TABLET ORAL DAILY
Status: DISCONTINUED | OUTPATIENT
Start: 2025-02-06 | End: 2025-02-06 | Stop reason: HOSPADM

## 2025-02-05 RX ORDER — LISINOPRIL 20 MG/1
20 TABLET ORAL ONCE
Status: COMPLETED | OUTPATIENT
Start: 2025-02-05 | End: 2025-02-05

## 2025-02-05 RX ORDER — SPIRONOLACTONE 25 MG/1
25 TABLET ORAL 2 TIMES DAILY
Status: DISCONTINUED | OUTPATIENT
Start: 2025-02-05 | End: 2025-02-05

## 2025-02-05 RX ADMIN — KETOROLAC TROMETHAMINE 15 MG: 30 INJECTION, SOLUTION INTRAMUSCULAR; INTRAVENOUS at 22:08

## 2025-02-05 RX ADMIN — OXYCODONE HYDROCHLORIDE 10 MG: 5 SOLUTION ORAL at 00:19

## 2025-02-05 RX ADMIN — POTASSIUM CHLORIDE 40 MEQ: 20 SOLUTION ORAL at 13:31

## 2025-02-05 RX ADMIN — METOROPROLOL TARTRATE 10 MG: 5 INJECTION, SOLUTION INTRAVENOUS at 02:58

## 2025-02-05 RX ADMIN — NICARDIPINE HYDROCHLORIDE 15 MG/HR: 2.5 INJECTION, SOLUTION INTRAVENOUS at 10:01

## 2025-02-05 RX ADMIN — CEFAZOLIN SODIUM 2000 MG: 2 SOLUTION INTRAVENOUS at 02:14

## 2025-02-05 RX ADMIN — KETOROLAC TROMETHAMINE 15 MG: 30 INJECTION, SOLUTION INTRAMUSCULAR; INTRAVENOUS at 16:19

## 2025-02-05 RX ADMIN — NICARDIPINE HYDROCHLORIDE 15 MG/HR: 2.5 INJECTION, SOLUTION INTRAVENOUS at 06:30

## 2025-02-05 RX ADMIN — LISINOPRIL 20 MG: 20 TABLET ORAL at 13:30

## 2025-02-05 RX ADMIN — FAMOTIDINE 20 MG: 10 INJECTION, SOLUTION INTRAVENOUS at 22:08

## 2025-02-05 RX ADMIN — ACETAMINOPHEN 1000 MG: 10 INJECTION INTRAVENOUS at 19:07

## 2025-02-05 RX ADMIN — ACETAMINOPHEN 1000 MG: 10 INJECTION INTRAVENOUS at 12:00

## 2025-02-05 RX ADMIN — NICARDIPINE HYDROCHLORIDE 12.5 MG/HR: 2.5 INJECTION, SOLUTION INTRAVENOUS at 12:00

## 2025-02-05 RX ADMIN — NICARDIPINE HYDROCHLORIDE 15 MG/HR: 2.5 INJECTION, SOLUTION INTRAVENOUS at 02:51

## 2025-02-05 RX ADMIN — FAMOTIDINE 20 MG: 10 INJECTION, SOLUTION INTRAVENOUS at 08:47

## 2025-02-05 RX ADMIN — KETOROLAC TROMETHAMINE 15 MG: 30 INJECTION, SOLUTION INTRAMUSCULAR; INTRAVENOUS at 10:02

## 2025-02-05 RX ADMIN — KETOROLAC TROMETHAMINE 15 MG: 30 INJECTION, SOLUTION INTRAMUSCULAR; INTRAVENOUS at 03:11

## 2025-02-05 RX ADMIN — NICARDIPINE HYDROCHLORIDE 12.5 MG/HR: 2.5 INJECTION, SOLUTION INTRAVENOUS at 14:01

## 2025-02-05 RX ADMIN — LISINOPRIL 20 MG: 20 TABLET ORAL at 08:47

## 2025-02-05 RX ADMIN — CEFAZOLIN SODIUM 2000 MG: 2 SOLUTION INTRAVENOUS at 10:01

## 2025-02-05 RX ADMIN — NICARDIPINE HYDROCHLORIDE 15 MG/HR: 2.5 INJECTION, SOLUTION INTRAVENOUS at 00:57

## 2025-02-05 RX ADMIN — METOPROLOL TARTRATE 25 MG: 25 TABLET, FILM COATED ORAL at 10:02

## 2025-02-05 RX ADMIN — SPIRONOLACTONE 25 MG: 25 TABLET ORAL at 08:47

## 2025-02-05 RX ADMIN — NICARDIPINE HYDROCHLORIDE 15 MG/HR: 2.5 INJECTION, SOLUTION INTRAVENOUS at 08:17

## 2025-02-05 RX ADMIN — HYDROCHLOROTHIAZIDE 25 MG: 25 TABLET ORAL at 13:30

## 2025-02-05 RX ADMIN — METOPROLOL TARTRATE 25 MG: 25 TABLET, FILM COATED ORAL at 05:14

## 2025-02-05 RX ADMIN — ACETAMINOPHEN 1000 MG: 10 INJECTION INTRAVENOUS at 05:13

## 2025-02-05 NOTE — PLAN OF CARE
Problem: PAIN - ADULT  Goal: Verbalizes/displays adequate comfort level or baseline comfort level  Description: Interventions:  - Encourage patient to monitor pain and request assistance  - Assess pain using appropriate pain scale  - Administer analgesics based on type and severity of pain and evaluate response  - Implement non-pharmacological measures as appropriate and evaluate response  - Consider cultural and social influences on pain and pain management  - Notify physician/advanced practitioner if interventions unsuccessful or patient reports new pain  Outcome: Progressing     Problem: INFECTION - ADULT  Goal: Absence or prevention of progression during hospitalization  Description: INTERVENTIONS:  - Assess and monitor for signs and symptoms of infection  - Monitor lab/diagnostic results  - Monitor all insertion sites, i.e. indwelling lines, tubes, and drains  - Monitor endotracheal if appropriate and nasal secretions for changes in amount and color  - [place] appropriate cooling/warming therapies per order  - Administer medications as ordered  - Instruct and encourage patient and family to use good hand hygiene technique  - Identify and instruct in appropriate isolation precautions for identified infection/condition  Outcome: Progressing  Goal: Absence of fever/infection during neutropenic period  Description: INTERVENTIONS:  - Monitor WBC    Outcome: Progressing     Problem: SAFETY ADULT  Goal: Patient will remain free of falls  Description: INTERVENTIONS:  - Educate patient/family on patient safety including physical limitations  - Instruct patient to call for assistance with activity   - Consult OT/PT to assist with strengthening/mobility   - Keep Call bell within reach  - Keep bed low and locked with side rails adjusted as appropriate  - Keep care items and personal belongings within reach  - Initiate and maintain comfort rounds  - Make Fall Risk Sign visible to staff  - Offer Toileting every 2 Hours, in  advance of need  - Initiate/Maintain Bed alarm  - Obtain necessary fall risk management equipment  - Apply yellow socks and bracelet for high fall risk patients  - Consider moving patient to room near nurses station  Outcome: Progressing  Goal: Maintain or return to baseline ADL function  Description: INTERVENTIONS:  -  Assess patient's ability to carry out ADLs; assess patient's baseline for ADL function and identify physical deficits which impact ability to perform ADLs (bathing, care of mouth/teeth, toileting, grooming, dressing, etc.)  - Assess/evaluate cause of self-care deficits   - Assess range of motion  - Assess patient's mobility; develop plan if impaired  - Assess patient's need for assistive devices and provide as appropriate  - Encourage maximum independence but intervene and supervise when necessary  - Involve family in performance of ADLs  - Assess for home care needs following discharge   - Consider OT consult to assist with ADL evaluation and planning for discharge  - Provide patient education as appropriate  Outcome: Progressing  Goal: Maintains/Returns to pre admission functional level  Description: INTERVENTIONS:  - Perform AM-PAC 6 Click Basic Mobility/ Daily Activity assessment daily.  - Set and communicate daily mobility goal to care team and patient/family/caregiver.   - Collaborate with rehabilitation services on mobility goals if consulted  - Perform Range of Motion 3 times a day.  - Reposition patient every 2 hours.  - Dangle patient 3 times a day  - Stand patient 3 times a day  - Ambulate patient 3 times a day  - Out of bed to chair 3 times a day   - Out of bed for meals 3 times a day  - Out of bed for toileting  - Record patient progress and toleration of activity level   Outcome: Progressing     Problem: DISCHARGE PLANNING  Goal: Discharge to home or other facility with appropriate resources  Description: INTERVENTIONS:  - Identify barriers to discharge w/patient and caregiver  -  Arrange for needed discharge resources and transportation as appropriate  - Identify discharge learning needs (meds, wound care, etc.)  - Arrange for interpretive services to assist at discharge as needed  - Refer to Case Management Department for coordinating discharge planning if the patient needs post-hospital services based on physician/advanced practitioner order or complex needs related to functional status, cognitive ability, or social support system  Outcome: Progressing     Problem: Knowledge Deficit  Goal: Patient/family/caregiver demonstrates understanding of disease process, treatment plan, medications, and discharge instructions  Description: Complete learning assessment and assess knowledge base.  Interventions:  - Provide teaching at level of understanding  - Provide teaching via preferred learning methods  Outcome: Progressing

## 2025-02-05 NOTE — PLAN OF CARE
Problem: PAIN - ADULT  Goal: Verbalizes/displays adequate comfort level or baseline comfort level  Description: Interventions:  - Encourage patient to monitor pain and request assistance  - Assess pain using appropriate pain scale  - Administer analgesics based on type and severity of pain and evaluate response  - Implement non-pharmacological measures as appropriate and evaluate response  - Consider cultural and social influences on pain and pain management  - Notify physician/advanced practitioner if interventions unsuccessful or patient reports new pain  2/5/2025 0814 by Olivier Avery RN  Outcome: Progressing  2/4/2025 2004 by Olivier Avery RN  Outcome: Progressing     Problem: INFECTION - ADULT  Goal: Absence or prevention of progression during hospitalization  Description: INTERVENTIONS:  - Assess and monitor for signs and symptoms of infection  - Monitor lab/diagnostic results  - Monitor all insertion sites, i.e. indwelling lines, tubes, and drains  - Monitor endotracheal if appropriate and nasal secretions for changes in amount and color  - [place] appropriate cooling/warming therapies per order  - Administer medications as ordered  - Instruct and encourage patient and family to use good hand hygiene technique  - Identify and instruct in appropriate isolation precautions for identified infection/condition  2/5/2025 0814 by Olivier Avery RN  Outcome: Progressing  2/4/2025 2004 by Olivier Avery RN  Outcome: Progressing  Goal: Absence of fever/infection during neutropenic period  Description: INTERVENTIONS:  - Monitor WBC    2/5/2025 0814 by Olivier Avery RN  Outcome: Progressing  2/4/2025 2004 by Olivier Avery RN  Outcome: Progressing     Problem: SAFETY ADULT  Goal: Patient will remain free of falls  Description: INTERVENTIONS:  - Educate patient/family on patient safety including physical limitations  - Instruct patient to call for assistance with activity   - Consult OT/PT to assist with  strengthening/mobility   - Keep Call bell within reach  - Keep bed low and locked with side rails adjusted as appropriate  - Keep care items and personal belongings within reach  - Initiate and maintain comfort rounds  - Make Fall Risk Sign visible to staff  - Offer Toileting every 2 Hours, in advance of need  - Initiate/Maintain Bed alarm  - Obtain necessary fall risk management equipment  - Apply yellow socks and bracelet for high fall risk patients  - Consider moving patient to room near nurses station  2/5/2025 0814 by Olivier Avery RN  Outcome: Progressing  2/4/2025 2004 by Olivier Avery RN  Outcome: Progressing  Goal: Maintain or return to baseline ADL function  Description: INTERVENTIONS:  -  Assess patient's ability to carry out ADLs; assess patient's baseline for ADL function and identify physical deficits which impact ability to perform ADLs (bathing, care of mouth/teeth, toileting, grooming, dressing, etc.)  - Assess/evaluate cause of self-care deficits   - Assess range of motion  - Assess patient's mobility; develop plan if impaired  - Assess patient's need for assistive devices and provide as appropriate  - Encourage maximum independence but intervene and supervise when necessary  - Involve family in performance of ADLs  - Assess for home care needs following discharge   - Consider OT consult to assist with ADL evaluation and planning for discharge  - Provide patient education as appropriate  2/5/2025 0814 by Olivier Avery RN  Outcome: Progressing  2/4/2025 2004 by Olivier Avery RN  Outcome: Progressing  Goal: Maintains/Returns to pre admission functional level  Description: INTERVENTIONS:  - Perform AM-PAC 6 Click Basic Mobility/ Daily Activity assessment daily.  - Set and communicate daily mobility goal to care team and patient/family/caregiver.   - Collaborate with rehabilitation services on mobility goals if consulted  - Perform Range of Motion 3 times a day.  - Reposition patient every 2  hours.  - Dangle patient 3 times a day  - Stand patient 3 times a day  - Ambulate patient 3 times a day  - Out of bed to chair 3 times a day   - Out of bed for meals 3 times a day  - Out of bed for toileting  - Record patient progress and toleration of activity level   2/5/2025 0814 by Olviier Avery RN  Outcome: Progressing  2/4/2025 2004 by Olivier Avery RN  Outcome: Progressing     Problem: DISCHARGE PLANNING  Goal: Discharge to home or other facility with appropriate resources  Description: INTERVENTIONS:  - Identify barriers to discharge w/patient and caregiver  - Arrange for needed discharge resources and transportation as appropriate  - Identify discharge learning needs (meds, wound care, etc.)  - Arrange for interpretive services to assist at discharge as needed  - Refer to Case Management Department for coordinating discharge planning if the patient needs post-hospital services based on physician/advanced practitioner order or complex needs related to functional status, cognitive ability, or social support system  2/5/2025 0814 by Olivier Avery RN  Outcome: Progressing  2/4/2025 2004 by Olivier Avery RN  Outcome: Progressing     Problem: Knowledge Deficit  Goal: Patient/family/caregiver demonstrates understanding of disease process, treatment plan, medications, and discharge instructions  Description: Complete learning assessment and assess knowledge base.  Interventions:  - Provide teaching at level of understanding  - Provide teaching via preferred learning methods  2/5/2025 0814 by Olivier Avery RN  Outcome: Progressing  2/4/2025 2004 by Olivier Avery RN  Outcome: Progressing

## 2025-02-05 NOTE — ASSESSMENT & PLAN NOTE
Patient received 400 mics of phenylephrine intra-op  Patient also had IV fluids running at 125 an hour preprocedure and 100 an hour postprocedure.  Net +1.1 L  We will hold IV fluids  Troponin negative  Restarted home lisinopril at higher dose, 20mg daily   Continue Cardene drip  PRN labetalol and hydralazine not effective  Trialed IV lopressor overnight, switched to PO metop tartrate 25 PO q6  Consider adding another agent today

## 2025-02-05 NOTE — ASSESSMENT & PLAN NOTE
Patient received 400 mics of phenylephrine intra-op  Patient also had IV fluids running at 125 an hour preprocedure and 100 an hour postprocedure.  Net +1.1 L  We will hold IV fluids  Troponin negative  Restarted home lisinopril at higher dose, 40 mg   Added hydrochlorothiazide    Cardene drip drip off 2/5  Systolic Blood pressures 150's-160

## 2025-02-05 NOTE — PROGRESS NOTES
Progress Note - Critical Care/ICU   Name: Josep Cooper 29 y.o. male I MRN: [id number]  Unit/Bed#: ICU 05 I Date of Admission: 2/4/2025   Date of Service: 2/5/2025 I Hospital Day: 1  { ?Quick Links I Problem List I PORCH I Billing Tip:[id number]}    {Critical Care Note Templates:12982}

## 2025-02-05 NOTE — PROGRESS NOTES
Progress Note - Critical Care/ICU   Name: Josep Cooper 29 y.o. male I MRN: [id number]  Unit/Bed#: ICU 05 I Date of Admission: 2/4/2025   Date of Service: 2/5/2025 I Hospital Day: 1      Assessment & Plan  Accelerated hypertension  Patient received 400 mics of phenylephrine intra-op  Patient also had IV fluids running at 125 an hour preprocedure and 100 an hour postprocedure.  Net +1.1 L  We will hold IV fluids  Troponin negative  Restarted home lisinopril at higher dose, 20mg daily   Continue Cardene drip  PRN labetalol and hydralazine not effective  Trialed IV lopressor overnight, switched to PO metop tartrate 25 PO q6  Consider adding another agent today   Class 3 severe obesity due to excess calories with body mass index (BMI) of 60.0 to 69.9 in adult (HCC)  Patient is status post gastrectomy sleeve robotically assisted with intraoperative EGD, postop day 1  Postoperative care per bariatric service  Continue pain control  STACI (obstructive sleep apnea)  Monitor respiratory status closely  BiPAP as needed  Disposition: Stepdown Level 1    ICU Core Measures     A: Assess, Prevent, and Manage Pain Has pain been assessed? Yes  Need for changes to pain regimen? No   B: Both SAT/SAT  N/A   C: Choice of Sedation RASS Goal: 0 Alert and Calm  Need for changes to sedation or analgesia regimen? No   D: Delirium CAM-ICU: Negative   E: Early Mobility  Plan for early mobility? Yes   F: Family Engagement Plan for family engagement today? Yes       Antibiotic Review: Post op requirements       Prophylaxis:  VTE VTE covered by:  enoxaparin, Subcutaneous       Stress Ulcer  covered byFamotidine (PF) (PEPCID) injection 20 mg [[id number]], omeprazole (PriLOSEC) 20 mg delayed release capsule [[id number]] (Long-Term Med)         24 Hour Events : POD #1 sleeve gastrectomy complicated by refractory hypertension post-op requiring cardene gtt. Overnight Cardene gtt at 15/hr, not responsive to PRN hydralazine or labetalol. Trialed IV  lopressor 5mg and 10mg without sustained response, started metop tartrate 25mg PO q6hrs. Consider adding another agent.    Subjective   Review of Systems: Review of Systems   Constitutional: Negative.    Respiratory: Negative.     Cardiovascular: Negative.    Gastrointestinal:  Positive for abdominal pain.   Genitourinary: Negative.    Musculoskeletal: Negative.    Neurological: Negative.    Psychiatric/Behavioral: Negative.         Objective :                   Vitals I/O      Most Recent Min/Max in 24hrs   Temp 98.8 °F (37.1 °C) Temp  Min: 98 °F (36.7 °C)  Max: 99.3 °F (37.4 °C)   Pulse 101 Pulse  Min: 64  Max: 117   Resp (!) 24 Resp  Min: 15  Max: 37   BP (!) 195/79 BP  Min: 132/62  Max: 217/90   O2 Sat 95 % SpO2  Min: 90 %  Max: 100 %      Intake/Output Summary (Last 24 hours) at 2/5/2025 0563  Last data filed at 2/5/2025 0513  Gross per 24 hour   Intake 4087.5 ml   Output 1280 ml   Net 2807.5 ml       Diet Bariatric; Bariatric Clear Liquid    Invasive Monitoring           Physical Exam   Physical Exam  Eyes:      Pupils: Pupils are equal, round, and reactive to light.   Skin:     General: Skin is warm and dry.   HENT:      Head: Normocephalic and atraumatic.   Cardiovascular:      Rate and Rhythm: Normal rate and regular rhythm.      Pulses: Normal pulses.      Heart sounds: Normal heart sounds.   Musculoskeletal:      Right lower leg: No edema.      Left lower leg: No edema.   Abdominal: General: There is no distension.      Palpations: Abdomen is soft.      Tenderness: There is abdominal tenderness.      Comments: 5x trocar incisions, clean dry intact   Constitutional:       Appearance: He is well-developed and well-nourished.   Pulmonary:      Effort: Pulmonary effort is normal.      Breath sounds: Normal breath sounds.   Neurological:      General: No focal deficit present.      Mental Status: He is alert and oriented to person, place and time. Mental status is at baseline. He is calm.      Motor: gross  motor function is at baseline for patient. Strength full and intact in all extremities.          Diagnostic Studies        Lab Results: I have reviewed the following results:     Medications:  Scheduled PRN   acetaminophen, 1,000 mg, Q6H SADI  cefazolin, 2,000 mg, Q8H  enoxaparin, 40 mg, Q24H  famotidine, 20 mg, BID  ketorolac, 15 mg, Q6H SADI  lisinopril, 20 mg, Daily  metoprolol tartrate, 25 mg, Q6H      diphenhydrAMINE, 25 mg, Q8H PRN  metoclopramide, 10 mg, Q6H PRN  morphine injection, 4 mg, Q4H PRN  ondansetron, 4 mg, Q6H PRN  oxyCODONE, 10 mg, Q4H PRN  oxyCODONE, 5 mg, Q4H PRN  phenol, 2 spray, Q2H PRN  promethazine, 25 mg, Q6H PRN  simethicone, 80 mg, 4x Daily PRN  trimethobenzamide, 200 mg, Q6H PRN       Continuous    niCARdipine, 1-15 mg/hr, Last Rate: 15 mg/hr (02/05/25 0251)         Labs:   CBC    Recent Labs     02/04/25  1803 02/05/25  0515   WBC 17.13* 16.38*   HGB 13.8 13.8   HCT 41.5 41.1    384   BANDSPCT 1  --      BMP    Recent Labs     02/04/25  1803   SODIUM 139   K 3.1*      CO2 23   AGAP 8   BUN 7   CREATININE 0.61   CALCIUM 8.2*       Coags    No recent results     Additional Electrolytes  Recent Labs     02/04/25  1803   PHOS 1.3*   CAIONIZED 1.04*          Blood Gas    No recent results  No recent results LFTs  Recent Labs     02/04/25  1803   ALT 20   AST 15   ALKPHOS 62   ALB 4.2   TBILI 0.47       Infectious  No recent results  Glucose  Recent Labs     02/04/25  1803   GLUC 114

## 2025-02-05 NOTE — ANESTHESIA POSTPROCEDURE EVALUATION
Post-Op Assessment Note    CV Status:  Stable  Pain Score: 0    Pain management: adequate    Multimodal analgesia used between 6 hours prior to anesthesia start to PACU discharge    Mental Status:  Alert and awake   Hydration Status:  Euvolemic   PONV Controlled:  Controlled   Airway Patency:  Patent     Post Op Vitals Reviewed: Yes    No anethesia notable event occurred.    Staff: Anesthesiologist, CRNA   Comments: Report given to recovering RN, VSS, Pt states he is comfortable.          Last Filed PACU Vitals:  Vitals Value Taken Time   Temp 98.2 °F (36.8 °C) 02/04/25 1208   Pulse 91 02/04/25 1208   /80 02/04/25 1208   Resp 16 02/04/25 1208   SpO2 96 % 02/04/25 1208       Modified Yadira:     Vitals Value Taken Time   Activity 2 02/04/25 1619   Respiration 2 02/04/25 1619   Circulation 2 02/04/25 1619   Consciousness 2 02/04/25 1619   Oxygen Saturation 2 02/04/25 1619     Modified Yadira Score: 10

## 2025-02-05 NOTE — UTILIZATION REVIEW
Initial Clinical Review    Elective Inpatient  surgical procedure  Age/Sex: 29 y.o. male  Surgery Date: 2/4  Procedure: Intraoperative Endoscopy  Laparoscopic Robotically Assisted Sleeve Gastrectomy  Anesthesia: General   Operative Findings: Hepatomegaly with a prominent left lobe of the liver        POD 0 Transferred to critical care service, level of care Stepdown 1 2/4 post op due to hypertensive emergency in setting of bariatric surgery today (risk of post-op complications. Nicardipine gtt started for goal SBP < 150. Resume lisinopril at higher dose 20 mg Continue clear liquid diet, pain control, Pepcid     POD#1 Progress Note: Continue cardene gtt  /79. .  Denies pain. Trialed IV lopressor overnight,  switch to PO metoprolol tartrate   25 mg PO q6h  Abdomen trocar sites x 5 are dry and intact  + abdominal tenderness    Last data filed at 2/5/2025 0513      Gross per 24 hour   Intake 4087.5 ml   Output 1280 ml   Net 2807.5 ml        Admission Orders: Date/Time/Statement:   Admission Orders (From admission, onward)       Ordered        02/04/25 1234  Inpatient Admission  Once                          Orders Placed This Encounter   Procedures    Inpatient Admission     Standing Status:   Standing     Number of Occurrences:   1     Level of Care:   Med Surg [16]     Estimated length of stay:   Inpatient Only Surgery     Diet: bariatric clear liquid   Mobility: not specified   DVT Prophylaxis: SCD's, SQ Lovenox     Medications/Pain Control:   Scheduled Medications:  acetaminophen, 1,000 mg, Intravenous, Q6H SADI  cefazolin, 2,000 mg, Intravenous, Q8H  famotidine, 20 mg, Intravenous, BID  ketorolac, 15 mg, Intravenous, Q6H SADI  lisinopril, 20 mg, Oral, Daily  metoprolol tartrate, 25 mg, Oral, Q6H  spironolactone, 25 mg, Oral, BID    HYdralazine 10 mg IV 2/4 2210     Continuous IV Infusions:  niCARdipine, 1-15 mg/hr, Intravenous, Titrated      PRN Meds:  diphenhydrAMINE, 25 mg, Oral, Q8H  PRN  metoclopramide, 10 mg, Intravenous, Q6H PRN  morphine injection, 4 mg, Intravenous, Q4H PRN  ondansetron, 4 mg, Intravenous, Q6H PRN  oxyCODONE, 10 mg, Oral, Q4H PRN  oxyCODONE, 5 mg, Oral, Q4H PRN  phenol, 2 spray, Mouth/Throat, Q2H PRN  promethazine, 25 mg, Intramuscular, Q6H PRN  simethicone, 80 mg, Oral, 4x Daily PRN  trimethobenzamide, 200 mg, Intramuscular, Q6H PRN      Vital Signs (last 3 days)       Date/Time Temp Pulse Resp BP MAP (mmHg) SpO2 O2 Flow Rate (L/min) O2 Device Patient Position - Orthostatic VS Marimar Coma Scale Score Pain    02/05/25 0915 -- 127 -- 167/77 110 97 % -- -- -- -- --    02/05/25 0900 -- 86 26 156/68 98 95 % -- -- -- -- --    02/05/25 0847 -- -- -- 159/66 -- -- -- -- -- -- --    02/05/25 0845 -- 94 21 159/66 95 96 % -- -- -- -- --    02/05/25 0830 -- 94 25 164/76 108 98 % -- -- -- -- --    02/05/25 0815 -- 93 28 164/77 110 97 % -- -- -- -- --    02/05/25 0800 -- 99 21 165/87 117 97 % -- None (Room air) Sitting 15 No Pain    02/05/25 0745 -- 90 29 219/102 140 96 % -- -- -- -- --    02/05/25 0730 -- 91 23 178/76 109 96 % -- -- -- -- --    02/05/25 0715 -- 90 21 190/77 110 94 % -- -- -- -- --    02/05/25 0700 98.2 °F (36.8 °C) 87 12 175/71 102 94 % -- -- -- -- --    02/05/25 0645 -- 90 21 225/93 133 94 % -- -- -- -- --    02/05/25 0630 -- 85 21 218/89 128 93 % -- -- -- -- --    02/05/25 0615 -- 88 21 224/95 137 93 % -- -- -- -- --    02/05/25 0600 -- 94 21 222/98 140 95 % -- -- -- -- --    02/05/25 0548 -- 93 22 201/90 129 94 % -- -- -- -- --    02/05/25 0530 -- 98 23 189/69 99 95 % -- -- -- -- --    02/05/25 0515 -- 101 24 195/79 114 95 % -- -- -- -- --    02/05/25 0500 -- 103 21 200/78 112 93 % -- -- -- -- --    02/05/25 0445 -- 95 23 196/76 109 94 % -- -- -- -- --    02/05/25 0430 -- 93 23 188/76 109 92 % -- -- -- -- --    02/05/25 0415 -- 97 24 178/74 106 93 % -- -- -- -- --    02/05/25 0414 -- -- -- -- -- -- -- -- -- 15 --    02/05/25 0400 -- 93 15 190/75 108 92 % -- -- -- --  --    02/05/25 0345 -- 90 16 189/77 111 92 % -- -- -- -- --    02/05/25 0330 -- 90 18 178/74 106 92 % -- -- -- -- --    02/05/25 0315 -- 94 22 169/71 102 94 % -- -- -- -- --    02/05/25 0311 98.8 °F (37.1 °C) -- -- -- -- -- -- -- -- -- 5    02/05/25 0300 -- 99 22 174/73 105 92 % -- -- -- -- --    02/05/25 0245 -- 98 24 201/83 119 90 % -- -- -- -- --    02/05/25 0230 -- 99 24 193/83 119 91 % -- -- -- -- --    02/05/25 0215 -- 95 26 193/78 112 92 % -- -- -- -- --    02/05/25 0200 -- 96 26 195/79 113 92 % -- -- -- -- --    02/05/25 0145 -- 97 28 188/78 112 92 % -- -- -- -- --    02/05/25 0130 -- 95 29 193/81 116 92 % -- -- -- -- --    02/05/25 0119 -- 97 29 -- -- 92 % -- -- -- -- --    02/05/25 0115 -- 97 28 181/77 111 92 % -- -- -- -- --    02/05/25 0100 -- 94 31 173/69 107 92 % -- -- -- -- --    02/05/25 0045 -- 94 31 175/81 116 94 % -- -- -- -- --    02/05/25 0030 -- 99 25 160/77 110 95 % -- -- -- -- --    02/05/25 0024 -- -- -- -- -- -- -- -- -- 15 --    02/05/25 0021 -- 104 24 147/74 103 97 % -- -- -- -- --    02/05/25 0019 -- -- -- -- -- -- -- -- -- -- 8    02/05/25 0015 -- 110 27 137/71 97 96 % -- None (Room air) Lying -- --    02/04/25 2345 -- 112 32 195/79 114 94 % -- -- -- -- --    02/04/25 2330 98 °F (36.7 °C) 110 24 184/78 112 93 % -- -- -- -- --    02/04/25 2326 -- -- -- -- -- 95 % -- None (Room air) -- -- --    02/04/25 2315 -- 102 32 196/79 113 93 % -- -- -- -- --    02/04/25 2300 -- 117 23 179/75 108 93 % -- -- -- -- --    02/04/25 2245 -- 102 32 185/76 109 92 % -- -- -- -- --    02/04/25 2230 -- 96 31 175/77 110 94 % -- -- -- -- --    02/04/25 2215 -- 99 30 174/77 110 93 % -- -- -- -- --    02/04/25 2200 -- 112 35 192/79 114 94 % -- -- -- -- --    02/04/25 2130 -- 101 19 189/77 110 95 % -- -- -- -- --    02/04/25 2125 -- 103 29 177/73 105 94 % -- -- -- -- 8 02/04/25 2100 -- 98 23 182/83 119 94 % -- -- -- -- --    02/04/25 2028 -- 102 23 181/74 107 93 % -- None (Room air) Lying 15 No Pain    02/04/25  2000 -- 107 -- 169/70 101 94 % -- -- -- -- --    02/04/25 1945 -- 105 31 183/75 108 95 % -- -- -- -- --    02/04/25 1943 -- -- -- 187/78 -- -- -- -- -- -- --    02/04/25 1930 -- 103 25 187/78 112 96 % -- -- -- -- --    02/04/25 1915 -- 105 32 181/74 106 92 % -- -- -- -- --    02/04/25 1900 -- 103 34 176/74 106 93 % -- -- -- -- --    02/04/25 1845 -- 104 29 177/75 108 94 % -- -- -- -- --    02/04/25 1830 -- 112 37 184/84 120 95 % -- -- -- -- --    02/04/25 1815 99.3 °F (37.4 °C) 104 31 185/70 115 96 % -- None (Room air) Lying -- --    02/04/25 1800 -- 105 26 176/81 117 97 % -- -- -- -- --    02/04/25 1745 -- 105 25 212/85 122 96 % -- -- -- -- --    02/04/25 1730 -- 104 25 203/87 125 96 % -- -- -- 15 No Pain    02/04/25 1701 -- 98 -- 195/83 -- -- -- -- -- -- --    02/04/25 1646 -- 95 -- 199/84 -- -- -- -- -- -- --    02/04/25 1645 -- 98 18 217/90 129 98 % -- None (Room air) -- -- 6    02/04/25 1620 -- 98 -- 210/90 -- -- -- -- -- -- --    02/04/25 1619 -- 96 17 209/84 -- 97 % -- None (Room air) -- -- --    02/04/25 1555 -- 98 -- 211/91 -- -- -- -- -- -- --    02/04/25 1548 -- -- -- -- -- -- -- -- -- -- 6    02/04/25 1544 -- 92 -- 206/89 -- -- -- -- -- -- --    02/04/25 1543 -- 92 -- 214/88 -- -- -- -- -- -- --    02/04/25 1533 -- 90 -- 210/93 -- -- -- -- -- -- --    02/04/25 1526 -- 88 18 192/88 -- 97 % -- None (Room air) -- -- 6    02/04/25 1518 -- 89 -- 187/88 -- -- -- -- -- -- --    02/04/25 1457 -- 86 -- 186/86 -- -- -- -- -- -- --    02/04/25 1451 -- -- -- 183/85 -- -- -- -- -- -- --    02/04/25 1447 -- 86 -- 184/81 117 -- -- -- -- -- --    02/04/25 1431 -- 80 19 177/69 -- 97 % 2 L/min Nasal cannula -- -- --    02/04/25 1428 -- 76 19 176/80 -- 97 % 2 L/min Nasal cannula -- -- --    02/04/25 1416 -- 72 18 179/83 -- -- -- -- -- -- --    02/04/25 1415 -- 72 -- -- -- -- -- -- -- -- --    02/04/25 1411 -- 78 17 181/86 123 97 % 2 L/min Nasal cannula -- -- --    02/04/25 1407 -- 70 18 192/90 -- 97 % 2 L/min Nasal cannula  -- -- 5 02/04/25 1406 -- 70 -- -- -- -- -- -- -- -- --    02/04/25 1401 -- 74 17 194/90 -- 98 % 2 L/min Nasal cannula -- -- 5 02/04/25 1352 -- 72 -- 188/89 -- -- -- -- -- -- --    02/04/25 1350 -- 68 -- -- -- -- -- -- -- -- --    02/04/25 1346 -- 76 18 171/82 -- 98 % 2 L/min Nasal cannula -- -- --    02/04/25 1342 -- -- -- -- -- -- -- -- -- -- 5 02/04/25 1341 -- 84 19 177/84 123 97 % 2 L/min Nasal cannula -- -- --    02/04/25 1336 -- -- -- 180/89 -- -- -- -- -- -- --    02/04/25 1315 -- 64 22 183/88 126 97 % -- -- -- -- --    02/04/25 1305 -- -- -- -- -- -- -- -- -- -- 7 02/04/25 1301 -- 80 17 198/94 -- 94 % 2 L/min Nasal cannula -- -- --    02/04/25 1256 -- -- -- -- -- -- -- -- -- -- 8 02/04/25 1248 -- -- -- -- -- -- -- -- -- -- 8 02/04/25 1245 -- 70 19 213/100 144 97 % 2 L/min Nasal cannula -- -- 8 02/04/25 1239 -- -- -- -- -- -- -- -- -- -- 8 02/04/25 1237 -- 74 -- 191/92 -- -- -- -- -- -- --    02/04/25 1234 -- -- -- -- -- -- -- -- -- -- 8 02/04/25 1215 -- 90 20 184/94 132 92 % -- None (Room air) -- -- No Pain    02/04/25 1208 98.2 °F (36.8 °C) 91 16 165/80 -- 96 % 6 L/min Simple mask -- -- --    02/04/25 1207 -- 96 27 165/80 136 96 % -- -- -- -- --    02/04/25 1200 98.5 °F (36.9 °C) 94 21 184/103 -- 100 % 6 L/min Simple mask -- -- No Pain    02/04/25 0901 -- -- -- -- -- -- -- -- -- -- No Pain    02/04/25 0804 98.7 °F (37.1 °C) 96 19 132/62 -- 99 % -- None (Room air) -- -- No Pain          Weight (last 2 days)       Date/Time Weight    02/05/25 0512 181 (399.69)    02/04/25 1815 183 (403.88)    02/04/25 0804 182 (400.58)            Pertinent Labs/Diagnostic Test Results:   Radiology:  No orders to display     Cardiology:  No orders to display     GI:  No orders to display           Results from last 7 days   Lab Units 02/05/25  0515 02/04/25  1803   WBC Thousand/uL 16.38* 17.13*   HEMOGLOBIN g/dL 13.8 13.8   HEMATOCRIT % 41.1 41.5   PLATELETS Thousands/uL 384 340   BANDS PCT %  --  1  "        Results from last 7 days   Lab Units 02/05/25  0515 02/04/25  1803   SODIUM mmol/L 140 139   POTASSIUM mmol/L 3.4* 3.1*   CHLORIDE mmol/L 110* 108   CO2 mmol/L 22 23   ANION GAP mmol/L 8 8   BUN mg/dL 7 7   CREATININE mg/dL 0.58* 0.61   EGFR ml/min/1.73sq m 137 134   CALCIUM mg/dL 8.5 8.2*   CALCIUM, IONIZED mmol/L  --  1.04*   MAGNESIUM mg/dL 1.8*  --    PHOSPHORUS mg/dL  --  1.3*     Results from last 7 days   Lab Units 02/04/25  1803   AST U/L 15   ALT U/L 20   ALK PHOS U/L 62   TOTAL PROTEIN g/dL 7.0   ALBUMIN g/dL 4.2   TOTAL BILIRUBIN mg/dL 0.47         Results from last 7 days   Lab Units 02/05/25  0515 02/04/25  1803   GLUCOSE RANDOM mg/dL 118 114             No results found for: \"BETA-HYDROXYBUTYRATE\"                   Results from last 7 days   Lab Units 02/04/25  2055 02/04/25  1803   HS TNI 0HR ng/L  --  5   HS TNI 4HR ng/L 6  --    HSTNI D4 ng/L 1  --                      Results from last 7 days   Lab Units 02/04/25  1803   LACTIC ACID mmol/L 1.3         Network Utilization Review Department  ATTENTION: Please call with any questions or concerns to [phone] and carefully listen to the prompts so that you are directed to the right person. All voicemails are confidential.   For Discharge needs, contact Care Management DC Support Team at [phone] opt. [phone]  Send all requests for admission clinical reviews, approved or denied determinations and any other requests to dedicated fax number below belonging to the campus where the patient is receiving treatment. List of dedicated fax numbers for the Facilities:  FACILITY NAME UR FAX NUMBER   ADMISSION DENIALS (Administrative/Medical Necessity) [phone]   DISCHARGE SUPPORT TEAM (NETWORK) [phone]   PARENT CHILD HEALTH (Maternity/NICU/Pediatrics) [phone]   [facility] [phone]   [facility] [phone]   [facility] [phone]   [facility]" [facility] [phone]   [facility] [phone]   [facility] [phone]   [facility] [phone]   [facility] [phone]   [facility] [phone]   [facility] [phone]   [facility] [phone]   [facility] [phone]

## 2025-02-05 NOTE — PROGRESS NOTES
"0.3363% VTE probailiby    Progress Note - Bariatric Surgery   Josep Cooper 29 y.o. male MRN: [id number]  Unit/Bed#: ICU 05 Encounter: [id number]      Subjective/Objective     Subjective:  Patient POD1 s/p rsleeve.      Patient transferred to ICU from PACU due to hypertensive urgency.    Patient on cardene ggt. Started on PO metop,spironolactone, and lisinopril.    Patient denies fevers, chills, sweats, SOB, CP, calf pain.  Pain adequately controlled on oral pain medication.  Ambulating without assistance, voiding well, and using incentive spirometer.  Patient tolerating liquid diet without nausea or vomiting today.  Vital signs stable.  CBC today shows nl.  BMP obtained today nl.      Objective:    /66   Pulse 94   Temp 98.2 °F (36.8 °C) (Oral)   Resp (!) 25   Ht 6' 2\" (1.88 m)   Wt (!) 181 kg (399 lb 11.1 oz)   SpO2 98%   BMI 51.32 kg/m²       Intake/Output Summary (Last 24 hours) at 2/5/2025 0857  Last data filed at 2/5/2025 0800  Gross per 24 hour   Intake 4635 ml   Output 1930 ml   Net 2705 ml       Invasive Devices       Peripheral Intravenous Line  Duration             Peripheral IV 02/04/25 Dorsal (posterior);Right Wrist 1 day    Peripheral IV 02/04/25 Distal;Right;Upper;Ventral (anterior) Arm <1 day                    ROS: 10-point system completed. All negative except see HPI.    Physical Exam    General Appearance:    Alert, cooperative, no distress, appears stated age   Head:    Normocephalic, without obvious abnormality, atraumatic   Lungs:     Respirations unlabored   Heart:    Regular rate and rhythm   Abdomen:     Soft, appropriate tenderness, no masses, no organomegaly, non-distended   Extremities:   Extremities normal, atraumatic, no cyanosis or edema   Neurologic:  Incision:  Psych:   Normal strength and sensation    Clean, dry, and intact, bleeding, drain    Normal mood and affect       Lab, Imaging and other studies:I have personally reviewed pertinent lab results.       VTE " Mechanical Prophylaxis: sequential compression device    Assessment/Plan  1)  Patient with Obesity s/p rsleeve with stable post op course.  Patient afebrile.    - Encourage PO fluids   - Recommend ambulation, use of SCDs when not ambulating, and incentive spirometry.   - Complete antibiotic course  -  Apprecaite ICU/SLIM HTN recs.  - Plan to D/C patient home today pending anticipated progression    Plan of care was discussed with patient.  Care plan discussed with Dr. Zenia Garcia MD  Bariatric Surgery  2/5/2025  8:57 AM

## 2025-02-05 NOTE — ASSESSMENT & PLAN NOTE
Patient is status post gastrectomy sleeve robotically assisted with intraoperative EGD, postop day 1  Postoperative care per bariatric service  Continue pain control

## 2025-02-06 ENCOUNTER — TRANSITIONAL CARE MANAGEMENT (OUTPATIENT)
Dept: FAMILY MEDICINE CLINIC | Facility: CLINIC | Age: 30
End: 2025-02-06

## 2025-02-06 VITALS
SYSTOLIC BLOOD PRESSURE: 121 MMHG | HEART RATE: 92 BPM | BODY MASS INDEX: 40.43 KG/M2 | OXYGEN SATURATION: 96 % | WEIGHT: 315 LBS | RESPIRATION RATE: 19 BRPM | HEIGHT: 74 IN | DIASTOLIC BLOOD PRESSURE: 64 MMHG | TEMPERATURE: 98.5 F

## 2025-02-06 PROCEDURE — NC001 PR NO CHARGE: Performed by: STUDENT IN AN ORGANIZED HEALTH CARE EDUCATION/TRAINING PROGRAM

## 2025-02-06 PROCEDURE — 99024 POSTOP FOLLOW-UP VISIT: CPT | Performed by: STUDENT IN AN ORGANIZED HEALTH CARE EDUCATION/TRAINING PROGRAM

## 2025-02-06 RX ORDER — LISINOPRIL 40 MG/1
40 TABLET ORAL DAILY
Qty: 30 TABLET | Refills: 0 | Status: SHIPPED | OUTPATIENT
Start: 2025-02-06

## 2025-02-06 RX ORDER — HYDROCHLOROTHIAZIDE 25 MG/1
25 TABLET ORAL DAILY
Qty: 30 TABLET | Refills: 0 | Status: SHIPPED | OUTPATIENT
Start: 2025-02-06

## 2025-02-06 RX ORDER — OXYCODONE HCL 5 MG/5 ML
10 SOLUTION, ORAL ORAL EVERY 4 HOURS PRN
Qty: 150 ML | Refills: 0 | Status: SHIPPED | OUTPATIENT
Start: 2025-02-06 | End: 2025-02-11

## 2025-02-06 RX ADMIN — ACETAMINOPHEN 1000 MG: 10 INJECTION INTRAVENOUS at 00:59

## 2025-02-06 RX ADMIN — KETOROLAC TROMETHAMINE 15 MG: 30 INJECTION, SOLUTION INTRAMUSCULAR; INTRAVENOUS at 05:13

## 2025-02-06 RX ADMIN — ACETAMINOPHEN 1000 MG: 10 INJECTION INTRAVENOUS at 05:13

## 2025-02-06 RX ADMIN — LISINOPRIL 40 MG: 20 TABLET ORAL at 08:35

## 2025-02-06 RX ADMIN — HYDROCHLOROTHIAZIDE 25 MG: 25 TABLET ORAL at 08:35

## 2025-02-06 RX ADMIN — FAMOTIDINE 20 MG: 10 INJECTION, SOLUTION INTRAVENOUS at 08:35

## 2025-02-06 NOTE — PROGRESS NOTES
"0.3363% VTE probailiby    Progress Note - Bariatric Surgery   Josep Cooper 29 y.o. male MRN: [id number]  Unit/Bed#: ICU 05 Encounter: [id number]      Subjective/Objective     Subjective:  Patient POD2 s/p rsleeve.      Patient transferred to ICU from PACU due to hypertensive urgency. Patient weaned off cardene ggt. BP wnl on PO HCTZ and lisinopril.    Patient denies fevers, chills, sweats, SOB, CP, calf pain.  Pain adequately controlled on oral pain medication.  Ambulating without assistance, voiding well, and using incentive spirometer.  Patient tolerating liquid diet without nausea or vomiting today.  Vital signs stable.  CBC today shows nl.  BMP obtained today nl.      Objective:    /64   Pulse 92   Temp 98.5 °F (36.9 °C) (Oral)   Resp 19   Ht 6' 2\" (1.88 m)   Wt (!) 181 kg (399 lb 11.1 oz)   SpO2 96%   BMI 51.32 kg/m²       Intake/Output Summary (Last 24 hours) at 2/6/2025 0823  Last data filed at 2/6/2025 0513  Gross per 24 hour   Intake 1105.83 ml   Output 950 ml   Net 155.83 ml       Invasive Devices       Peripheral Intravenous Line  Duration             Peripheral IV 02/04/25 Dorsal (posterior);Right Wrist 2 days    Peripheral IV 02/04/25 Distal;Right;Upper;Ventral (anterior) Arm 1 day                    ROS: 10-point system completed. All negative except see HPI.    Physical Exam    General Appearance:    Alert, cooperative, no distress, appears stated age   Head:    Normocephalic, without obvious abnormality, atraumatic   Lungs:     Respirations unlabored   Heart:    Regular rate and rhythm   Abdomen:     Soft, appropriate tenderness, no masses, no organomegaly, non-distended   Extremities:   Extremities normal, atraumatic, no cyanosis or edema   Neurologic:  Incision:  Psych:   Normal strength and sensation    Clean, dry, and intact, bleeding, drain    Normal mood and affect       Lab, Imaging and other studies:I have personally reviewed pertinent lab results.       VTE Mechanical " Prophylaxis: sequential compression device    Assessment/Plan  1)  Patient with Obesity s/p rsleeve with stable post op course.  Patient afebrile.    - Encourage PO fluids   - Recommend ambulation, use of SCDs when not ambulating, and incentive spirometry.   - Complete antibiotic course  - Apprecaite ICU/SLIM HTN recs.  - Plan to D/C patient home today pending anticipated progression    Plan of care was discussed with patient.  Care plan discussed with Dr. Zenia Garcia MD  Bariatric Surgery  2/6/2025  8:23 AM

## 2025-02-06 NOTE — PLAN OF CARE
Problem: PAIN - ADULT  Goal: Verbalizes/displays adequate comfort level or baseline comfort level  Description: Interventions:  - Encourage patient to monitor pain and request assistance  - Assess pain using appropriate pain scale  - Administer analgesics based on type and severity of pain and evaluate response  - Implement non-pharmacological measures as appropriate and evaluate response  - Consider cultural and social influences on pain and pain management  - Notify physician/advanced practitioner if interventions unsuccessful or patient reports new pain  Outcome: Adequate for Discharge     Problem: INFECTION - ADULT  Goal: Absence or prevention of progression during hospitalization  Description: INTERVENTIONS:  - Assess and monitor for signs and symptoms of infection  - Monitor lab/diagnostic results  - Monitor all insertion sites, i.e. indwelling lines, tubes, and drains  - Monitor endotracheal if appropriate and nasal secretions for changes in amount and color  - [place] appropriate cooling/warming therapies per order  - Administer medications as ordered  - Instruct and encourage patient and family to use good hand hygiene technique  - Identify and instruct in appropriate isolation precautions for identified infection/condition  Outcome: Adequate for Discharge  Goal: Absence of fever/infection during neutropenic period  Description: INTERVENTIONS:  - Monitor WBC    Outcome: Adequate for Discharge     Problem: SAFETY ADULT  Goal: Patient will remain free of falls  Description: INTERVENTIONS:  - Educate patient/family on patient safety including physical limitations  - Instruct patient to call for assistance with activity   - Consult OT/PT to assist with strengthening/mobility   - Keep Call bell within reach  - Keep bed low and locked with side rails adjusted as appropriate  - Keep care items and personal belongings within reach  - Initiate and maintain comfort rounds  - Make Fall Risk Sign visible to  staff  - Offer Toileting every  Hours, in advance of need  - Initiate/Maintain alarm  - Obtain necessary fall risk management equipment:   - Apply yellow socks and bracelet for high fall risk patients  - Consider moving patient to room near nurses station  Outcome: Adequate for Discharge  Goal: Maintain or return to baseline ADL function  Description: INTERVENTIONS:  -  Assess patient's ability to carry out ADLs; assess patient's baseline for ADL function and identify physical deficits which impact ability to perform ADLs (bathing, care of mouth/teeth, toileting, grooming, dressing, etc.)  - Assess/evaluate cause of self-care deficits   - Assess range of motion  - Assess patient's mobility; develop plan if impaired  - Assess patient's need for assistive devices and provide as appropriate  - Encourage maximum independence but intervene and supervise when necessary  - Involve family in performance of ADLs  - Assess for home care needs following discharge   - Consider OT consult to assist with ADL evaluation and planning for discharge  - Provide patient education as appropriate  Outcome: Adequate for Discharge  Goal: Maintains/Returns to pre admission functional level  Description: INTERVENTIONS:  - Perform AM-PAC 6 Click Basic Mobility/ Daily Activity assessment daily.  - Set and communicate daily mobility goal to care team and patient/family/caregiver.   - Collaborate with rehabilitation services on mobility goals if consulted  - Perform Range of Motion  times a day.  - Reposition patient every  hours.  - Dangle patient  times a day  - Stand patient  times a day  - Ambulate patient  times a day  - Out of bed to chair  times a day   - Out of bed for meals times a day  - Out of bed for toileting  - Record patient progress and toleration of activity level   Outcome: Adequate for Discharge     Problem: DISCHARGE PLANNING  Goal: Discharge to home or other facility with appropriate resources  Description: INTERVENTIONS:  -  Identify barriers to discharge w/patient and caregiver  - Arrange for needed discharge resources and transportation as appropriate  - Identify discharge learning needs (meds, wound care, etc.)  - Arrange for interpretive services to assist at discharge as needed  - Refer to Case Management Department for coordinating discharge planning if the patient needs post-hospital services based on physician/advanced practitioner order or complex needs related to functional status, cognitive ability, or social support system  Outcome: Adequate for Discharge     Problem: Knowledge Deficit  Goal: Patient/family/caregiver demonstrates understanding of disease process, treatment plan, medications, and discharge instructions  Description: Complete learning assessment and assess knowledge base.  Interventions:  - Provide teaching at level of understanding  - Provide teaching via preferred learning methods  Outcome: Adequate for Discharge

## 2025-02-06 NOTE — DISCHARGE SUMMARY
Discharge Summary - Josep Cooper 29 y.o. male MRN: [id number]    Unit/Bed#: ICU 05 Encounter: [id number]      Pre-Operative Diagnosis: Pre-Op Diagnosis Codes:      * Morbid obesity (HCC) [E66.01]     * Obstructive sleep apnea (adult) (pediatric) [G47.33]     * Essential hypertension, malignant [I10]    Post-Operative Diagnosis: Post-Op Diagnosis Codes:     * Morbid obesity (HCC) [E66.01]     * Obstructive sleep apnea (adult) (pediatric) [G47.33]     * Essential hypertension, malignant [I10]    Procedures Performed:  Procedure(s):  GASTRECTOMY  SLEEVE ROBOTICALLY ASSISTED W/ INTRAOPERATIVE EGD    Surgeon: Raul Knutson MD    See H & P for full details of admission and Operative Note for full details of operations performed.     Patient tolerated surgery well without complications.     Patient transferred to ICU from PACU due to hypertensive urgency. Patient weaned off cardene ggt. BP wnl on PO HCTZ and lisinopril.     In the morning postoperative Day 2, the patient had mild nausea and abdominal pain. Tolerated a clear liquid diet without vomiting. Able to ambulate and voiding independently. Patient was deemed ready for discharge home NOT on lovenox.    Patient was seen and examined prior to discharge.      Provisions for Follow-Up Care:  See After Visit Summary/Discharge Instructions for information related to follow-up care and home orders.      Disposition: Home, in stable condition.     Planned Readmission: No    Discharge Medications:  See After Visit Summary/Discharge Instructions for reconciled discharge medications provided to patient and family.      Post Operative instructions: Reviewed with patient and/or family.    Signature:   Matt Garcia MD  Date: 2/6/2025 Time: 8:38 AM

## 2025-02-06 NOTE — DISCHARGE INSTRUCTIONS
your medications (if applicable) from [facility] in Hospital Lobby   It is not necessary to cut or open medications unless it is better for you to tolerate that way, you can mix with liquid to drink  Take Tylenol every 8 hours around the clock, unless instructed otherwise  Take your omeprazole daily  It is important to stay hydrated and follow your discharge diet progression   Mild nausea is ok as long as you can drink fluids, sip very slowly and get up and walk during any periods of nausea  You may shower normally after 48 hours, but do not scrub incision sites, blot gently with clean towel to dry incisions  Take home medications as usual unless instructed otherwise while in hospital  Follow up with Dr. Knutson and your PCP within the next week  ONLY IF instructed: Complete full course of lovenox injections

## 2025-02-06 NOTE — HOSPITAL COURSE
28 yo M hx morbid obesity, HTN who underwent laparoscopic robotically assisted sleeve gastrectomy on 2/4 with bariatric surgery and had hospital course complicated by hypertensive emergency. Post-operatively BP was difficult to control with PRN labetalol and hydralazine. Started Cardene gtt which escalated quickly to 15mcg. Added PO lisinopril and despite Cardene and further PRNs still could not achieve target SBP < 160. On 2/5, lisinopril increased to 40mg, given one dose aldactone, and added HCTZ 25mg daily. Came off Cardene gtt with BP controlled. Remained hemodynamically stable overnight - 2/6.

## 2025-02-06 NOTE — DISCHARGE INSTR - AVS FIRST PAGE
We have changed your blood pressure medications because of your elevated BP while in the hospital. You should check your BP daily at home. Please call your family physician with readings below 120 systolic or above 190. You should follow-up with your family physician in one week for an in office BP check    Bariatric/Weight Loss Surgery  Hospital Discharge Instructions  ACTIVITY:  Progress as feels comfortable - a good rule is:  if you are doing something and it begins to hurt, stop doing the activity. Walk every hour while at home.  You may walk stairs if you do so slowly  You may shower 48 hours after surgery.  Do not scrub incision sites.  Blot gently with clean towel to dry incisions. (see #4 below)   Use your incentive spirometer 10 times per hour while awake for 1 week after surgery.  Do NOT drive for 48 hours after surgery. No driving 24 hours after taking certain prescription pain medications. Examples of such medication are Percocet, Darvocet, Oxycodone, Tylenol #3, and Tylenol with Codeine.     DIET  Stay on a liquid diet for 7 days after your surgery date, sipping slowly. Refer to your manual for examples of choices. Remember to keep your liquids sugar free or low calorie. You may have protein drinks. Make sure to drink 48 to 64 ounces per day of fluids.   You may advance to a pureed diet one week after surgery as instructed by your diet progression pamphlet. Once you get approval from your surgeon at your first post operative visit, you may advance to the soft diet and remain on soft diet for 8 weeks unless otherwise instructed.    MEDICATIONS:  The abdominal nerve block will wear off during the first 1-2 days that you are home, and you may become sore (especially over incision site/sites where abdominal wall is sutured). This may create a pulling sensation, especially while moving around, and will fade over time.  Continue to take your Tylenol and your pain medication as instructed.   Start vitamins and  minerals one week after surgery or when you start stage 3/puree diet.   Anti-acid Medication as per prescription.  Other medications as indicated on the Physician Patient Discharge Instructions form given to you at the time of discharge.  You will need to consult with your Family Doctor in regards to all your prescribed medication, particularly those for blood pressure and diabetes.  As you lose weight, medical conditions may change, requiring an alteration or elimination of the drug dose. Monitor blood pressure closely and call PCP with any concerns.   Sleeve Gastrectomy patients ONLY:  Complete full course of lovenox injections!  Females: DO NOT TAKE BIRTH CONTROL(BC) MEDICATIONS, INSERT BC VAGINAL RINGS, OR PLACE IUD OR ANY OTHER BC METHODS UNTIL 31 DAYS FROM DAY OF DISCHARGE FROM HOSPITAL. THIS PLACES YOU AT HIGH RISK FOR A POTENTIALLY LIFE THREATENING BLOOD CLOT. Remember to always use barrier methods for birth control and speak to your GYN about using two forms of birth control to start 31 days after surgery. It is very important to avoid pregnancy until at least 18-24 months after surgery.     INCISION CARE  You may shower and get incisions wet 2 days after surgery. No soaking tub baths or swimming for 30 days after surgery. Keep abdominal area and incisions clean. Use soap and water to create a good lather and rinse off.  Do not scrub incisions.   If you have a drain, empty the drain as the nurses instructed.    FOLLOW-UP APPOINTMENT should be made for one week after discharge. Call surgeon’s office at [phone] to schedule an appointment.    CALL YOUR DOCTOR FOR:  pain not controlled by pain medications, a temperature greater than 101.5° F, any increase or change in drainage or redness from any incision, any vomiting or inability to keep liquids down, shortness of breath, shoulder pain, or bleeding

## 2025-02-06 NOTE — PLAN OF CARE
Problem: PAIN - ADULT  Goal: Verbalizes/displays adequate comfort level or baseline comfort level  Description: Interventions:  - Encourage patient to monitor pain and request assistance  - Assess pain using appropriate pain scale  - Administer analgesics based on type and severity of pain and evaluate response  - Implement non-pharmacological measures as appropriate and evaluate response  - Consider cultural and social influences on pain and pain management  - Notify physician/advanced practitioner if interventions unsuccessful or patient reports new pain  Outcome: Progressing     Problem: INFECTION - ADULT  Goal: Absence or prevention of progression during hospitalization  Description: INTERVENTIONS:  - Assess and monitor for signs and symptoms of infection  - Monitor lab/diagnostic results  - Monitor all insertion sites, i.e. indwelling lines, tubes, and drains  - Monitor endotracheal if appropriate and nasal secretions for changes in amount and color  - [place] appropriate cooling/warming therapies per order  - Administer medications as ordered  - Instruct and encourage patient and family to use good hand hygiene technique  - Identify and instruct in appropriate isolation precautions for identified infection/condition  Outcome: Progressing     Problem: INFECTION - ADULT  Goal: Absence of fever/infection during neutropenic period  Description: INTERVENTIONS:  - Monitor WBC    Outcome: Progressing     Problem: Knowledge Deficit  Goal: Patient/family/caregiver demonstrates understanding of disease process, treatment plan, medications, and discharge instructions  Description: Complete learning assessment and assess knowledge base.  Interventions:  - Provide teaching at level of understanding  - Provide teaching via preferred learning methods  Outcome: Progressing

## 2025-02-07 ENCOUNTER — TELEPHONE (OUTPATIENT)
Dept: MEDSURG UNIT | Facility: HOSPITAL | Age: 30
End: 2025-02-07

## 2025-02-07 PROCEDURE — 88307 TISSUE EXAM BY PATHOLOGIST: CPT | Performed by: PATHOLOGY

## 2025-02-07 NOTE — TELEPHONE ENCOUNTER
Post op follow up phone call completed.  Pt is sipping liquids and has consumed about 16 oz so far today.  Not using IS hourly but using intermittently, reinforced importance of using IS to help prevent pneumonia. Ambulating about home without difficulty.  Pain controlled with analgesia.  Reaffirmed examples of liquid diet over the next week.  Did not start miralax, passing gas, had BM yesterday.  Pt stated understanding about discharge instructions and medication adjustments.  Follow up appt with surgeon scheduled for next week.   Instructed to call with any additional questions or concerns.

## 2025-02-07 NOTE — UTILIZATION REVIEW
NOTIFICATION OF ADMISSION DISCHARGE   This is a Notification of Discharge from [facility]. Please be advised that this patient has been discharge from our facility. Below you will find the admission and discharge date and time including the patient’s disposition.   UTILIZATION REVIEW CONTACT:  Awilda Carlton  Utilization   Network Utilization Review Department  Phone: [phone] x carefully listen to the prompts. All voicemails are confidential.  Email: [email or web address]     ADMISSION INFORMATION  PRESENTATION DATE: 2/4/2025  7:46 AM  OBERVATION ADMISSION DATE: N/A  INPATIENT ADMISSION DATE: 2/4/25 12:34 PM   DISCHARGE DATE: 2/6/2025 11:04 AM   DISPOSITION:Home/Self Care    Network Utilization Review Department  ATTENTION: Please call with any questions or concerns to [phone] and carefully listen to the prompts so that you are directed to the right person. All voicemails are confidential.   For Discharge needs, contact Care Management DC Support Team at [phone] opt. 2  Send all requests for admission clinical reviews, approved or denied determinations and any other requests to dedicated fax number below belonging to the campus where the patient is receiving treatment. List of dedicated fax numbers for the Facilities:  FACILITY NAME UR FAX NUMBER   ADMISSION DENIALS (Administrative/Medical Necessity) [phone]   DISCHARGE SUPPORT TEAM ([facility]) [phone]   PARENT CHILD HEALTH (Maternity/NICU/Pediatrics) [phone]   [facility] [phone]   [facility] [phone]   [facility] [phone]   [facility] [phone]   [facility] [phone]   [facility] [phone]   [facility] [phone]   [facility] [phone]   [facility]  [facility] [phone]   [facility] [phone]   [facility] [phone]   [facility] [phone]

## 2025-02-12 NOTE — PROGRESS NOTES
Weight Management Nutrition Class     Diagnosis: Morbid Obesity    Bariatric Surgeon: Dr. Raul Knutson    Surgery: Vertical Sleeve Gastrectomy    Class: first post op note    Topics discussed today include:     fluid goals post op, protein goals post op, constipation, chew food well, exercise, PPI use, diet progression, hypoglycemia, dumping syndrome, protein supplems, vitamin/mineral supplements, calcium supplements, and iron supplements    Patient was able to verbalize basic diet (protein, fluid, vitamin and mineral) recommendations and possible nutrition-related complications. Yes

## 2025-02-14 ENCOUNTER — OFFICE VISIT (OUTPATIENT)
Dept: BARIATRICS | Facility: CLINIC | Age: 30
End: 2025-02-14

## 2025-02-14 ENCOUNTER — CLINICAL SUPPORT (OUTPATIENT)
Dept: BARIATRICS | Facility: CLINIC | Age: 30
End: 2025-02-14

## 2025-02-14 VITALS
BODY MASS INDEX: 41.75 KG/M2 | WEIGHT: 315 LBS | DIASTOLIC BLOOD PRESSURE: 70 MMHG | SYSTOLIC BLOOD PRESSURE: 110 MMHG | HEART RATE: 100 BPM | HEIGHT: 73 IN | TEMPERATURE: 97.5 F

## 2025-02-14 DIAGNOSIS — Z98.84 BARIATRIC SURGERY STATUS: ICD-10-CM

## 2025-02-14 DIAGNOSIS — Z48.89 POSTOPERATIVE VISIT: Primary | ICD-10-CM

## 2025-02-14 DIAGNOSIS — E66.01 CLASS 3 SEVERE OBESITY DUE TO EXCESS CALORIES WITHOUT SERIOUS COMORBIDITY WITH BODY MASS INDEX (BMI) OF 60.0 TO 69.9 IN ADULT (HCC): Primary | ICD-10-CM

## 2025-02-14 DIAGNOSIS — E66.813 CLASS 3 SEVERE OBESITY DUE TO EXCESS CALORIES WITHOUT SERIOUS COMORBIDITY WITH BODY MASS INDEX (BMI) OF 60.0 TO 69.9 IN ADULT (HCC): Primary | ICD-10-CM

## 2025-02-14 PROCEDURE — 99024 POSTOP FOLLOW-UP VISIT: CPT | Performed by: SURGERY

## 2025-02-14 PROCEDURE — RECHECK: Performed by: DIETITIAN, REGISTERED

## 2025-02-14 NOTE — PROGRESS NOTES
Date of surgery:2/4/2025  Procedure: Sleeve  Performing surgeon: Dr. Knutson    Initial Weight - 474 lb  Current Weight -368.5 lb  [place] Weight -  now   Total Body Weight Loss (EWL)- 106%  EWL% - 37%  TWB % -22%

## 2025-02-14 NOTE — PROGRESS NOTES
POST OP UP VISIT - BARIATRIC SURGERY  Josep Cooper 29 y.o. male MRN: [id number]  Unit/Bed#:  Encounter: [id number]      HPI:  Josep Cooper is a 29 y.o. male status post robotic sleeve gastrectomy performed by Dr. Knutson on 2/4 returning to office for first post op visit since surgery.    Tolerating diet.  Denies nausea and vomiting.  Taking multivitamins and PPI daily.      Review of Systems   Constitutional:  Negative for chills and fever.   HENT:  Negative for ear pain and sore throat.    Eyes:  Negative for pain and visual disturbance.   Respiratory:  Negative for cough and shortness of breath.    Cardiovascular:  Negative for chest pain and palpitations.   Gastrointestinal:  Negative for abdominal pain and vomiting.   Genitourinary:  Negative for dysuria and hematuria.   Musculoskeletal:  Negative for arthralgias and back pain.   Skin:  Negative for color change and rash.   Neurological:  Negative for seizures and syncope.   All other systems reviewed and are negative.      Historical Information   Past Medical History:   Diagnosis Date    Concussion with loss of consciousness 04/18/2017    Last Assessment & Plan: Formatting of this note might be different from the original. Requesting records from [facility]    CPAP (continuous positive airway pressure) dependence     Hypertension     Sleep apnea      Past Surgical History:   Procedure Laterality Date    EGD      WV LAPS GSTRC RSTRICTIV PX LONGITUDINAL GASTRECTOMY N/A 2/4/2025    Procedure: GASTRECTOMY  SLEEVE ROBOTICALLY ASSISTED W/ INTRAOPERATIVE EGD;  Surgeon: Raul Knutson MD;  Location: [facility];  Service: Bariatrics     Social History   Social History     Substance and Sexual Activity   Alcohol Use Never     Social History     Substance and Sexual Activity   Drug Use Never     Social History     Tobacco Use   Smoking Status Former    Current packs/day: 0.00    Average packs/day: 0.3 packs/day for 11.7 years (2.9 ttl pk-yrs)    Types:  "Cigarettes    Start date: 2012    Quit date: 2023    Years since quittin.4   Smokeless Tobacco Never     Family History: Family history non-contributory    Meds/Allergies   all medications and allergies reviewed  Allergies   Allergen Reactions    Tuberculin Ppd Other (See Comments)     Other reaction(s): Positive skin tests in past       Objective       Current Vitals:   Blood Pressure: 110/70 (25)  Pulse: 100 (25)  Temperature: 97.5 °F (36.4 °C) (25)  Temp Source: Tympanic (25)  Height: 6' 1\" (185.4 cm) (25)  Weight - Scale: (!) 167 kg (368 lb 8 oz) (25)      Invasive Devices       None                   Physical Exam  Constitutional:       Appearance: Normal appearance.   HENT:      Head: Normocephalic.      Nose: Nose normal.   Eyes:      Extraocular Movements: Extraocular movements intact.   Cardiovascular:      Rate and Rhythm: Normal rate.   Pulmonary:      Effort: Pulmonary effort is normal.   Abdominal:      General: Abdomen is flat.      Palpations: Abdomen is soft.      Comments: SS CDI   Musculoskeletal:         General: Normal range of motion.   Skin:     General: Skin is warm.   Psychiatric:         Mood and Affect: Mood normal.         Behavior: Behavior normal.             Assessment/PLAN:    29 y.o. male status post robotic sleeve gastrectomy done on  by Dr. Knutson, doing well post op. No major issues and healing well.     The pathology report was reviewed with the patient and the results were within normal limits.     Pathology, and Other Studies: Results Review Statement: I personally reviewed the following image studies/reports in PACS and discussed pertinent findings with Radiology:  . My interpretation of the radiology images/reports is:  .  Lab Results   Component Value Date    FINALDX  2025     A. Stomach, sleeve gastrectomy:  -Segment of stomach with no significant histopathologic change.         Increase " physical activity slowly as tolerated and instructed.  Advance diet as instructed by our dietitians today and as indicated in the binder.  Continue PPI    Follow up in one month as scheduled.        Matt Garcia MD  Bariatric Surgery   2/14/2025  9:58 AM      .

## 2025-02-19 LAB
ATRIAL RATE: 96 BPM
P AXIS: 58 DEGREES
PR INTERVAL: 160 MS
QRS AXIS: 26 DEGREES
QRSD INTERVAL: 96 MS
QT INTERVAL: 362 MS
QTC INTERVAL: 457 MS
T WAVE AXIS: 35 DEGREES
VENTRICULAR RATE: 96 BPM

## 2025-02-19 PROCEDURE — 93010 ELECTROCARDIOGRAM REPORT: CPT | Performed by: INTERNAL MEDICINE

## 2025-03-26 ENCOUNTER — CLINICAL SUPPORT (OUTPATIENT)
Dept: BARIATRICS | Facility: CLINIC | Age: 30
End: 2025-03-26

## 2025-03-26 DIAGNOSIS — Z98.84 BARIATRIC SURGERY STATUS: Primary | ICD-10-CM

## 2025-03-26 PROCEDURE — RECHECK: Performed by: DIETITIAN, REGISTERED

## 2025-03-26 NOTE — PROGRESS NOTES
Weight Management Nutrition Class     Diagnosis: Morbid Obesity    Bariatric Surgeon: Dr. Raul Knutson    Surgery: Vertical Sleeve Gastrectomy    Class: 5 week post op     Topics discussed today include:     fluid goals post op, protein goals post op, constipation, chew food well, exercise, avoidance of alcohol, PPI use, diet progression, hypoglycemia, dumping syndrome, protein supplems, vitamin/mineral supplements, calcium supplements, and iron supplements    Patient was able to verbalize basic diet (protein, fluid, vitamin and mineral) recommendations and possible nutrition-related complications. Yes

## 2025-05-14 ENCOUNTER — OFFICE VISIT (OUTPATIENT)
Dept: BARIATRICS | Facility: CLINIC | Age: 30
End: 2025-05-14
Payer: COMMERCIAL

## 2025-05-14 VITALS
DIASTOLIC BLOOD PRESSURE: 92 MMHG | HEIGHT: 73 IN | HEART RATE: 73 BPM | BODY MASS INDEX: 41.75 KG/M2 | SYSTOLIC BLOOD PRESSURE: 154 MMHG | TEMPERATURE: 97.5 F | WEIGHT: 315 LBS

## 2025-05-14 DIAGNOSIS — E56.9 INADEQUATE VITAMIN INTAKE: ICD-10-CM

## 2025-05-14 DIAGNOSIS — K91.2 POSTSURGICAL MALABSORPTION: ICD-10-CM

## 2025-05-14 DIAGNOSIS — E66.813 OBESITY, CLASS III, BMI 40-49.9 (MORBID OBESITY): ICD-10-CM

## 2025-05-14 DIAGNOSIS — I10 ACCELERATED HYPERTENSION: ICD-10-CM

## 2025-05-14 DIAGNOSIS — Z48.815 ENCOUNTER FOR SURGICAL AFTERCARE FOLLOWING SURGERY OF DIGESTIVE SYSTEM: Primary | ICD-10-CM

## 2025-05-14 DIAGNOSIS — Z98.84 BARIATRIC SURGERY STATUS: ICD-10-CM

## 2025-05-14 PROCEDURE — 99214 OFFICE O/P EST MOD 30 MIN: CPT | Performed by: NURSE PRACTITIONER

## 2025-05-14 NOTE — PATIENT INSTRUCTIONS
- see PCP for hypertension  - restart omeprazole for better tolerance for foods. Don't skip meals, try to add protein snacks.    - obtain labs - make sure you are fasting. Recommend to start on even at least a regular multivitamin and take two doses.   - follow up in 3 months for 6-month post op viist.

## 2025-05-14 NOTE — PROGRESS NOTES
Date of surgery:02/24/2025  Procedure:Sleeve  Performing surgeon: Dr. Knutson     Initial Weight - 474 lb  Current Weight -334 lb  [place] Weight - now  Total Body Weight Loss (EWL)- 140.9%  EWL% - 49%  TWB % -30%    For *NEW/TRANSFER* patients only: Who referred the patient? Please provide name and specialty (PCP, GI, etc.).

## 2025-05-14 NOTE — PROGRESS NOTES
Assessment/Plan:     Patient ID: Josep Cooper is a 30 y.o. male.     Bariatric Surgery Status/BMI 44/HTN    -s/p Vertical Sleeve Gastrectomy with Dr. Knutson on 02/24/2025. Presents to the office today for 2nd post op visit. Overall doing fair - not taking any of his medications or vitamins. Tolerating a regular diet but does not eat often as he is not hungry. Reports he is experiencing low blood sugar levels and he is using soda to restore this. Does not feel hungry hence eats only about one meal per day. May have some vomiting at times but this may depend on certain foods he eats. Denies having any abdominal pain, D/C, regurgitation, reflux or dysphagia. Stopped his omeprazole as well.     PLAN:     - recommended to restart his BP Meds as his BP was elevated today in our office. Recheck was 150/100. - advised to start on at least one BP medication. Advised to f/u with PCP.   - recommended to see our RD for post op support however pt would like to defer for now. Discuss would  not recommend one meal per day; recommend to start protein snacks to prevent hypoglycemia.   - restart omeprazole and continue until next visit.  - Routine follow up in 3 months for 3rd post op visit.   - Continue with healthy lifestyle, adequate protein intake of 60 gm, fluid intake of at least 64 oz.   - Continue with MVI daily.   - Activity as tolerated.   - Labs ordered and will adjust accordingly if any deficiency.   - Follow up with RD and SW as needed.   .    Continued/Maintain healthy weight loss with good nutrition intakes.  Adequate hydration with at least 64oz. fluid intake.  Follow diet as discussed.  Follow vitamin and mineral recommendations as reviewed with you.  Exercise as tolerated.    Colonoscopy referral made: n/a    Follow-up in 3 months for 3rd post op visit. We kindly ask that your arrive 15 minutes before your scheduled appointment time with your provider to allow our staff to room you, get your vital signs and update  your chart.    Get lab work done prior to visit. Please call the office if you need a script.  It is recommended to check with your insurance BEFORE getting labs done to make sure they are covered by your policy.      Call our office if you have any problems with abdominal pain especially associated with fever, chills, nausea, vomiting or any other concerns.    All  Post-bariatric surgery patients should be aware that very small quantities of any alcohol can cause impairment and it is very possible not to feel the effect. The effect can be in the system for several hours.  It is also a stomach irritant.     It is advised to AVOID alcohol, Nonsteroidal antiinflammatory drugs (NSAIDS) and nicotine of all forms . Any of these can cause stomach irritation/pain.    Discussed the effects of alcohol on a bariatric patient and the increased impairment risk.     Keep up the good work!     Postsurgical Malabsorption   -At risk for malabsorption of vitamins/minerals secondary to malabsorption and restriction of intake from bariatric surgery  -NOT Currently taking adequate postop bariatric surgery vitamin supplementation  -Next set of bariatric labs ordered for approximately 2 weeks  -Patient received education about the importance of adhering to a lifelong supplementation regimen to avoid vitamin/mineral deficiencies      Diagnoses and all orders for this visit:    Encounter for surgical aftercare following surgery of digestive system  -     CBC; Future  -     Comprehensive metabolic panel; Future  -     Folate; Future  -     Iron Panel (Includes Ferritin, Iron Sat%, Iron, and TIBC); Future  -     PTH, intact; Future  -     Vitamin A; Future  -     Vitamin B1, whole blood; Future  -     Vitamin B12; Future  -     Vitamin D 25 hydroxy; Future  -     Zinc; Future  -     Methylmalonic acid, serum; Future    Bariatric surgery status  -     CBC; Future  -     Comprehensive metabolic panel; Future  -     Folate; Future  -     Iron  Panel (Includes Ferritin, Iron Sat%, Iron, and TIBC); Future  -     PTH, intact; Future  -     Vitamin A; Future  -     Vitamin B1, whole blood; Future  -     Vitamin B12; Future  -     Vitamin D 25 hydroxy; Future  -     Zinc; Future  -     Methylmalonic acid, serum; Future    Postsurgical malabsorption  -     CBC; Future  -     Comprehensive metabolic panel; Future  -     Folate; Future  -     Iron Panel (Includes Ferritin, Iron Sat%, Iron, and TIBC); Future  -     PTH, intact; Future  -     Vitamin A; Future  -     Vitamin B1, whole blood; Future  -     Vitamin B12; Future  -     Vitamin D 25 hydroxy; Future  -     Zinc; Future  -     Methylmalonic acid, serum; Future    Obesity, Class III, BMI 40-49.9 (morbid obesity)  -     CBC; Future  -     Comprehensive metabolic panel; Future  -     Folate; Future  -     Iron Panel (Includes Ferritin, Iron Sat%, Iron, and TIBC); Future  -     PTH, intact; Future  -     Vitamin A; Future  -     Vitamin B1, whole blood; Future  -     Vitamin B12; Future  -     Vitamin D 25 hydroxy; Future  -     Zinc; Future  -     Methylmalonic acid, serum; Future    BMI 40.0-44.9, adult (HCC)  -     CBC; Future  -     Comprehensive metabolic panel; Future  -     Folate; Future  -     Iron Panel (Includes Ferritin, Iron Sat%, Iron, and TIBC); Future  -     PTH, intact; Future  -     Vitamin A; Future  -     Vitamin B1, whole blood; Future  -     Vitamin B12; Future  -     Vitamin D 25 hydroxy; Future  -     Zinc; Future  -     Methylmalonic acid, serum; Future    Accelerated hypertension    Inadequate vitamin intake         Subjective:      Patient ID: Josep Cooper is a 30 y.o. male.  -s/p Vertical Sleeve Gastrectomy with Dr. Knutson on 02/24/2025. Presents to the office today for 2nd post op visit. Overall doing fair - not taking any of his medications or vitamins. Tolerating a regular diet but does not eat often as he is not hungry. Reports he is experiencing low blood sugar levels and he  "is using soda to restore this. Does not feel hungry hence eats only about one meal per day. May have some vomiting at times but this may depend on certain foods he eats. Denies having any abdominal pain, D/C, regurgitation, reflux or dysphagia. Stopped his omeprazole as well.     Initial: 474 lbs   Current: 334 lbs   EWL: (Weight loss is ahead of schedule at this post surgical period.)  Roque: current   Current BMI is Body mass index is 44.07 kg/m².    Tolerating a regular diet-yes  Eating at least 60 grams of protein per day- no   Following 30/60 minute rule with liquids-yes  Drinking at least 64 ounces of fluid per day-yes  Drinking carbonated beverages-yes  Sufficient exercise-yes - walking   Using NSAIDs regularly-no  Using nicotine-no  Using alcohol-no  Supplements: NONE     EWL is 49%, which places the patient ahead of schedule for expected post surgical weight loss at this time.     The following portions of the patient's history were reviewed and updated as appropriate: allergies, current medications, past family history, past medical history, past social history, past surgical history and problem list.    Review of Systems   Constitutional: Negative.    Respiratory: Negative.     Cardiovascular: Negative.    Gastrointestinal: Negative.    Musculoskeletal: Negative.    Neurological: Negative.    Psychiatric/Behavioral: Negative.           Objective:    /92 (Patient Position: Sitting, Cuff Size: Standard)   Pulse 73   Temp 97.5 °F (36.4 °C) (Tympanic)   Ht 6' 1\" (1.854 m)   Wt (!) 152 kg (334 lb)   BMI 44.07 kg/m²      Physical Exam  Vitals and nursing note reviewed.   Constitutional:       Appearance: Normal appearance. He is obese.     Cardiovascular:      Rate and Rhythm: Normal rate and regular rhythm.      Pulses: Normal pulses.      Heart sounds: Normal heart sounds.   Pulmonary:      Effort: Pulmonary effort is normal.      Breath sounds: Normal breath sounds.   Abdominal:      General: " Bowel sounds are normal.      Palpations: Abdomen is soft.      Tenderness: There is no abdominal tenderness.     Musculoskeletal:         General: Normal range of motion.     Skin:     General: Skin is warm and dry.     Neurological:      General: No focal deficit present.      Mental Status: He is alert and oriented to person, place, and time. Mental status is at baseline.     Psychiatric:         Mood and Affect: Mood normal.         Behavior: Behavior normal.         Thought Content: Thought content normal.         Judgment: Judgment normal.

## 2025-06-18 ENCOUNTER — OFFICE VISIT (OUTPATIENT)
Age: 30
End: 2025-06-18
Payer: COMMERCIAL

## 2025-06-18 VITALS
HEART RATE: 86 BPM | SYSTOLIC BLOOD PRESSURE: 160 MMHG | HEIGHT: 74 IN | DIASTOLIC BLOOD PRESSURE: 98 MMHG | BODY MASS INDEX: 39.01 KG/M2 | WEIGHT: 304 LBS

## 2025-06-18 DIAGNOSIS — K76.0 METABOLIC DYSFUNCTION-ASSOCIATED STEATOTIC LIVER DISEASE (MASLD): Primary | ICD-10-CM

## 2025-06-18 DIAGNOSIS — K76.9 LIVER LESION: ICD-10-CM

## 2025-06-18 DIAGNOSIS — K91.2 POSTSURGICAL MALABSORPTION: ICD-10-CM

## 2025-06-18 DIAGNOSIS — R74.8 ELEVATED LIVER ENZYMES: ICD-10-CM

## 2025-06-18 DIAGNOSIS — Z98.84 BARIATRIC SURGERY STATUS: ICD-10-CM

## 2025-06-18 DIAGNOSIS — I10 HYPERTENSION, UNSPECIFIED TYPE: ICD-10-CM

## 2025-06-18 PROCEDURE — 99213 OFFICE O/P EST LOW 20 MIN: CPT | Performed by: FAMILY MEDICINE

## 2025-06-18 NOTE — PROGRESS NOTES
Name: Josep Cooper      : 1995      MRN: [id number]  Encounter Provider: Mary Mello PA-C  Encounter Date: 2025   Encounter department: [facility] [address] [address]  :  Assessment & Plan  Metabolic dysfunction-associated steatotic liver disease (MASLD)  Elevated liver enzymes  Bariatric surgery status  Patient was initially referred to hepatology for preoperative clearance and evaluation of elevated liver enzymes in anticipation of a sleeve gastrectomy. He had a single episode of mildly elevated transaminases in 2023 but otherwise his liver chemistries have been routinely within normal limits. He had an acute hepatitis panel and iron studies which were negative/normal. No clinical evidence of chronic liver disease.    It was suspected that his previously abnormal liver tests were secondary to MASLD. Since his last appointment, he had an RUQ US which demonstrated hepatomegaly and hepatic steatosis although he did not have his follow-up US elastography to assess for advanced hepatic fibrosis.     He also underwent a sleeve gastrectomy with Dr. Knutson on 2025 and has lost 170 lbs since his initial consultation with weight management. Truly congratulated his efforts!    He is aware of the basic pathophysiology of fatty liver disease, potential to progress to cirrhosis if left untreated and recommendations for treatment including steady and sustainable weight loss, optimization of his metabolic risk factors and limiting his EtOH use. He will continue following with bariatric medicine given his postsurgical status. Also strongly encouraged him to complete a focused serologic workup to assess for A1AT Pi* carrier status and immunity to hepatitis A/B as well as his US elastography to better assess for advanced hepatic fibrosis. He is agreeable. Further recommendations pending the completion of his workup.    Orders:  •  Alpha 1 Antitrypsin Phenotype; Future  •   Hepatitis A antibody, total; Future  •  Hepatitis B surface antibody; Future  •  Hepatic function panel; Future    Liver lesion  Patient was incidentally seen to have a small (1.1 x 0.9 x 1 cm) indeterminate left lobe hypoechoic focus possibly representing focal fatty sparing on ultrasound.  As per his radiology report, it was recommended that he have a 6-month repeat ultrasound to demonstrate stability. This was ordered for him but has yet to be completed.  No personal history of malignancy or FH of liver related cancers. No clinical, serologic or radiographic evidence of chronic liver disease.    He will schedule his repeat RUQ US, along with his US elastography, while in office today.  If his ultrasound continues to show an indeterminate focus, or if he is found to have advanced hepatic fibrosis on elastography, then would consider a triphasic MRI abdomen for definitive characterization.  Further recommendations pending the completion of his RUQ US.       Postsurgical malabsorption  Patient reports that he is not taking any of his bariatric vitamins.  Strongly advised that he resume taking these and have his labs drawn to monitor for postsurgical vitamin deficiencies as were previously ordered by bariatric surgery.   He is agreeable to this.    Hypertension, unspecified type  Patient reports that he is not taking his antihypertensives.  Strongly advised that he resume taking these as previously instructed by bariatric surgery.  He believes that his BP was elevated due to drinking a red bull prior to his appointment.   He is due for a DOT physical at which time he will have his BP rechecked and restarted his antihypertensives if this remains elevated.       Follow-up in 6 months or as needed pending lab and ultrasound results.      History of Present Illness   HPI    Josep Cooper is a 30 y.o. male with PMH significant for morbid obesity, HTN, STACI, insomnia and a history of tobacco use who presents today for  follow-up regarding elevated liver enzymes and fatty liver disease.    Interval history  - Last seen 10/30/2024.  - RUQ US (11/14/2024) notable for hepatomegaly and hepatic steatosis as well as a small (1.1 x 0.9 x 1 cm) indeterminate left lobe hypoechoic focus possibly representing focal fatty sparing. He was recommended he have a repeat US x 6 months to demonstrate stability.  - He did not have his repeat RUQ US or US elastography completed as previously ordered.  - S/p sleeve gastrectomy with Dr. Knutson on 2/24/2025.    Extended liver history  Josep is following with bariatric surgery and considering a sleeve gastrectomy. He was referred to hepatology for a preoperative examination and evaluation of elevated liver enzymes. Per chart review, he had a single episode of mildly elevated serum transaminases in December 2023. His most recent labs from September 2024 showed normal liver chemistries.  He has had an acute hepatitis panel and iron studies which were negative. Serologies also show preserved platelet count. No prior imaging available for review.      Denies a family history of liver disease or liver related cancers. He drinks alcohol on average 1-2 times monthly and denies drinking in excess.     He is also utilizing Zepbound and reports an approximate 45 lb weight loss.      History obtained from: patient    Review of Systems   All other systems reviewed and are negative.    Pertinent Medical History     Medical History Reviewed by provider this encounter:  Tobacco  Allergies  Meds  Problems  Med Hx  Surg Hx  Fam Hx     .  Past Medical History   Past Medical History[1]  Past Surgical History[2]  Family History[3]   reports that he quit smoking about 21 months ago. His smoking use included cigarettes. He started smoking about 13 years ago. He has a 2.9 pack-year smoking history. He has never used smokeless tobacco. He reports that he does not drink alcohol and does not use drugs.  Current Outpatient  "Medications   Medication Instructions   • Cyanocobalamin (VITAMIN B 12 PO) 2 tablets, Daily   • hydroCHLOROthiazide 25 mg, Oral, Daily   • lisinopril (ZESTRIL) 40 mg, Oral, Daily   • Magnesium 250 MG CAPS 2 capsules, Daily   • Multiple Vitamins-Minerals (MENS MULTIVITAMIN PO) 1 tablet, Daily   • Omega-3 Fatty Acids (OMEGA 3 PO) 2 tablets, Daily   • omeprazole (PRILOSEC) 20 mg, Oral, Daily   • VITAMIN D, CHOLECALCIFEROL, PO 1 tablet, Daily   • Zepbound 10 mg, Subcutaneous, Weekly   Allergies[4]   Medications Ordered Prior to Encounter[5]   Social History[6]     Objective   /98 (BP Location: Left arm, Patient Position: Sitting, Cuff Size: Extra-Large)   Pulse 86   Ht 6' 2\" (1.88 m)   Wt (!) 138 kg (304 lb)   BMI 39.03 kg/m²      Physical Exam  Vitals and nursing note reviewed.   Constitutional:       General: He is not in acute distress.     Appearance: Normal appearance. He is well-developed. He is obese. He is not ill-appearing or toxic-appearing.   HENT:      Head: Normocephalic and atraumatic.     Eyes:      General: No scleral icterus.     Conjunctiva/sclera: Conjunctivae normal.     Pulmonary:      Effort: Pulmonary effort is normal. No respiratory distress.   Abdominal:      General: There is no distension.      Palpations: Abdomen is soft. There is no mass.      Tenderness: There is no abdominal tenderness.     Musculoskeletal:      Right lower leg: No edema.      Left lower leg: No edema.     Skin:     General: Skin is warm and dry.      Coloration: Skin is not jaundiced.      Findings: No bruising or rash.     Neurological:      Mental Status: He is alert and oriented to person, place, and time. Mental status is at baseline.     Psychiatric:         Mood and Affect: Mood normal.         Behavior: Behavior normal.                  [1]  Past Medical History:  Diagnosis Date   • Concussion with loss of consciousness 04/18/2017    Last Assessment & Plan: Formatting of this note might be different from " the original. Requesting records from [facility]   • CPAP (continuous positive airway pressure) dependence    • Hypertension    • Sleep apnea    [2]  Past Surgical History:  Procedure Laterality Date   • EGD     • AR LAPS GSTRC RSTRICTIV PX LONGITUDINAL GASTRECTOMY N/A 2/4/2025    Procedure: GASTRECTOMY  SLEEVE ROBOTICALLY ASSISTED W/ INTRAOPERATIVE EGD;  Surgeon: Raul Knutson MD;  Location: [facility];  Service: Bariatrics   [3]  Family History  Problem Relation Name Age of Onset   • Diabetes Paternal Grandfather     [4]  Allergies  Allergen Reactions   • Tuberculin Ppd Other (See Comments)     Other reaction(s): Positive skin tests in past   [5]  Current Outpatient Medications on File Prior to Visit   Medication Sig Dispense Refill   • Cyanocobalamin (VITAMIN B 12 PO) Take 2 tablets by mouth in the morning (Patient not taking: Reported on 2/14/2025)     • hydroCHLOROthiazide 25 mg tablet Take 1 tablet (25 mg total) by mouth daily (Patient not taking: Reported on 6/18/2025) 30 tablet 0   • lisinopril (ZESTRIL) 40 mg tablet Take 1 tablet (40 mg total) by mouth daily (Patient not taking: Reported on 6/18/2025) 30 tablet 0   • Magnesium 250 MG CAPS Take 2 capsules by mouth in the morning (Patient not taking: Reported on 2/14/2025)     • Multiple Vitamins-Minerals (MENS MULTIVITAMIN PO) Take 1 tablet by mouth in the morning (Patient not taking: Reported on 6/18/2025)     • Omega-3 Fatty Acids (OMEGA 3 PO) Take 2 tablets by mouth in the morning (Patient not taking: Reported on 2/14/2025)     • omeprazole (PriLOSEC) 20 mg delayed release capsule Take 1 capsule (20 mg total) by mouth daily Do not start before February 5, 2025. (Patient not taking: Reported on 6/18/2025) 90 capsule 1   • tirzepatide (Zepbound) 10 mg/0.5 mL auto-injector Inject 0.5 mL (10 mg total) under the skin once a week (Patient not taking: Reported on 2/14/2025) 2 mL 1   • VITAMIN D, CHOLECALCIFEROL, PO Take 1 tablet by mouth in the  morning (Patient not taking: Reported on 2025)       No current facility-administered medications on file prior to visit.   [6]  Social History  Tobacco Use   • Smoking status: Former     Current packs/day: 0.00     Average packs/day: 0.3 packs/day for 11.7 years (2.9 ttl pk-yrs)     Types: Cigarettes     Start date: 2012     Quit date: 2023     Years since quittin.7   • Smokeless tobacco: Never   Vaping Use   • Vaping status: Former   Substance and Sexual Activity   • Alcohol use: Never   • Drug use: Never   • Sexual activity: Not Currently     Partners: Female

## 2025-07-29 ENCOUNTER — APPOINTMENT (OUTPATIENT)
Dept: LAB | Facility: CLINIC | Age: 30
End: 2025-07-29
Payer: COMMERCIAL

## 2025-07-29 DIAGNOSIS — K91.2 POSTSURGICAL MALABSORPTION: ICD-10-CM

## 2025-07-29 DIAGNOSIS — K76.0 METABOLIC DYSFUNCTION-ASSOCIATED STEATOTIC LIVER DISEASE (MASLD): ICD-10-CM

## 2025-07-29 DIAGNOSIS — Z98.84 BARIATRIC SURGERY STATUS: ICD-10-CM

## 2025-07-29 DIAGNOSIS — Z48.815 ENCOUNTER FOR SURGICAL AFTERCARE FOLLOWING SURGERY OF DIGESTIVE SYSTEM: ICD-10-CM

## 2025-07-29 DIAGNOSIS — E66.813 OBESITY, CLASS III, BMI 40-49.9 (MORBID OBESITY): ICD-10-CM

## 2025-07-29 LAB
25(OH)D3 SERPL-MCNC: 27.6 NG/ML (ref 30–100)
ALBUMIN SERPL BCG-MCNC: 4.5 G/DL (ref 3.5–5)
ALP SERPL-CCNC: 87 U/L (ref 34–104)
ALT SERPL W P-5'-P-CCNC: 18 U/L (ref 7–52)
ANION GAP SERPL CALCULATED.3IONS-SCNC: 11 MMOL/L (ref 4–13)
AST SERPL W P-5'-P-CCNC: 18 U/L (ref 13–39)
BILIRUB DIRECT SERPL-MCNC: 0.17 MG/DL (ref 0–0.2)
BILIRUB SERPL-MCNC: 1.08 MG/DL (ref 0.2–1)
BUN SERPL-MCNC: 7 MG/DL (ref 5–25)
CALCIUM SERPL-MCNC: 9.9 MG/DL (ref 8.4–10.2)
CHLORIDE SERPL-SCNC: 103 MMOL/L (ref 96–108)
CO2 SERPL-SCNC: 27 MMOL/L (ref 21–32)
CREAT SERPL-MCNC: 0.82 MG/DL (ref 0.6–1.3)
ERYTHROCYTE [DISTWIDTH] IN BLOOD BY AUTOMATED COUNT: 14.2 % (ref 11.6–15.1)
FERRITIN SERPL-MCNC: 131 NG/ML (ref 30–336)
FOLATE SERPL-MCNC: 3.1 NG/ML
GFR SERPL CREATININE-BSD FRML MDRD: 118 ML/MIN/1.73SQ M
GLUCOSE SERPL-MCNC: 100 MG/DL (ref 65–140)
HCT VFR BLD AUTO: 46.4 % (ref 36.5–49.3)
HGB BLD-MCNC: 15 G/DL (ref 12–17)
IRON SATN MFR SERPL: 18 % (ref 15–50)
IRON SERPL-MCNC: 58 UG/DL (ref 50–212)
MCH RBC QN AUTO: 28.9 PG (ref 26.8–34.3)
MCHC RBC AUTO-ENTMCNC: 32.3 G/DL (ref 31.4–37.4)
MCV RBC AUTO: 89 FL (ref 82–98)
PLATELET # BLD AUTO: 398 THOUSANDS/UL (ref 149–390)
PMV BLD AUTO: 11 FL (ref 8.9–12.7)
POTASSIUM SERPL-SCNC: 3.5 MMOL/L (ref 3.5–5.3)
PROT SERPL-MCNC: 7.6 G/DL (ref 6.4–8.4)
PTH-INTACT SERPL-MCNC: 44.6 PG/ML (ref 12–88)
RBC # BLD AUTO: 5.19 MILLION/UL (ref 3.88–5.62)
SODIUM SERPL-SCNC: 141 MMOL/L (ref 135–147)
TIBC SERPL-MCNC: 322 UG/DL (ref 250–450)
TRANSFERRIN SERPL-MCNC: 230 MG/DL (ref 203–362)
UIBC SERPL-MCNC: 264 UG/DL (ref 155–355)
VIT B12 SERPL-MCNC: 196 PG/ML (ref 180–914)
WBC # BLD AUTO: 8.8 THOUSAND/UL (ref 4.31–10.16)

## 2025-07-29 PROCEDURE — 82103 ALPHA-1-ANTITRYPSIN TOTAL: CPT

## 2025-07-29 PROCEDURE — 82306 VITAMIN D 25 HYDROXY: CPT

## 2025-07-29 PROCEDURE — 85027 COMPLETE CBC AUTOMATED: CPT

## 2025-07-29 PROCEDURE — 82728 ASSAY OF FERRITIN: CPT

## 2025-07-29 PROCEDURE — 84630 ASSAY OF ZINC: CPT

## 2025-07-29 PROCEDURE — 84425 ASSAY OF VITAMIN B-1: CPT

## 2025-07-29 PROCEDURE — 86706 HEP B SURFACE ANTIBODY: CPT

## 2025-07-29 PROCEDURE — 82607 VITAMIN B-12: CPT

## 2025-07-29 PROCEDURE — 83918 ORGANIC ACIDS TOTAL QUANT: CPT

## 2025-07-29 PROCEDURE — 82746 ASSAY OF FOLIC ACID SERUM: CPT

## 2025-07-29 PROCEDURE — 83970 ASSAY OF PARATHORMONE: CPT

## 2025-07-29 PROCEDURE — 83550 IRON BINDING TEST: CPT

## 2025-07-29 PROCEDURE — 82104 ALPHA-1-ANTITRYPSIN PHENO: CPT

## 2025-07-29 PROCEDURE — 86708 HEPATITIS A ANTIBODY: CPT

## 2025-07-29 PROCEDURE — 80053 COMPREHEN METABOLIC PANEL: CPT

## 2025-07-29 PROCEDURE — 82248 BILIRUBIN DIRECT: CPT

## 2025-07-29 PROCEDURE — 36415 COLL VENOUS BLD VENIPUNCTURE: CPT

## 2025-07-29 PROCEDURE — 84590 ASSAY OF VITAMIN A: CPT

## 2025-07-29 PROCEDURE — 83540 ASSAY OF IRON: CPT

## 2025-07-30 ENCOUNTER — OFFICE VISIT (OUTPATIENT)
Dept: FAMILY MEDICINE CLINIC | Facility: CLINIC | Age: 30
End: 2025-07-30

## 2025-07-30 VITALS
WEIGHT: 283 LBS | BODY MASS INDEX: 36.32 KG/M2 | HEART RATE: 83 BPM | RESPIRATION RATE: 18 BRPM | OXYGEN SATURATION: 98 % | SYSTOLIC BLOOD PRESSURE: 132 MMHG | HEIGHT: 74 IN | DIASTOLIC BLOOD PRESSURE: 80 MMHG | TEMPERATURE: 98 F

## 2025-07-30 DIAGNOSIS — E55.9 VITAMIN D INSUFFICIENCY: ICD-10-CM

## 2025-07-30 DIAGNOSIS — Z13.220 LIPID SCREENING: ICD-10-CM

## 2025-07-30 DIAGNOSIS — Z02.89 PHYSICAL EXAMINATION OF EMPLOYEE: Primary | ICD-10-CM

## 2025-07-30 DIAGNOSIS — E53.8 FOLIC ACID DEFICIENCY: ICD-10-CM

## 2025-07-30 LAB
HAV AB SER QL IA: REACTIVE
HBV SURFACE AB SER-ACNC: 11.2 MIU/ML

## 2025-07-30 PROCEDURE — 99214 OFFICE O/P EST MOD 30 MIN: CPT | Performed by: FAMILY MEDICINE

## 2025-07-30 RX ORDER — FOLIC ACID 0.4 MG
400 TABLET ORAL DAILY
Qty: 30 TABLET | Refills: 2 | Status: SHIPPED | OUTPATIENT
Start: 2025-07-30

## 2025-07-31 ENCOUNTER — TELEPHONE (OUTPATIENT)
Dept: FAMILY MEDICINE CLINIC | Facility: CLINIC | Age: 30
End: 2025-07-31

## 2025-08-01 ENCOUNTER — CLINICAL SUPPORT (OUTPATIENT)
Dept: FAMILY MEDICINE CLINIC | Facility: CLINIC | Age: 30
End: 2025-08-01

## 2025-08-01 DIAGNOSIS — Z02.89 PHYSICAL EXAMINATION OF EMPLOYEE: Primary | ICD-10-CM

## 2025-08-01 LAB
METHYLMALONATE SERPL-SCNC: 82 NMOL/L (ref 0–378)
VIT A SERPL-MCNC: 36.3 UG/DL (ref 18.9–57.3)

## 2025-08-01 PROCEDURE — 99173 VISUAL ACUITY SCREEN: CPT

## 2025-08-01 PROCEDURE — 92551 PURE TONE HEARING TEST AIR: CPT

## 2025-08-02 LAB — ZINC SERPL-MCNC: 71 UG/DL (ref 44–115)

## 2025-08-03 LAB
A1AT PHENOTYP SERPL IFE: NORMAL
A1AT SERPL-MCNC: 163 MG/DL (ref 95–164)
VIT B1 BLD-SCNC: 52.5 NMOL/L (ref 66.5–200)

## 2025-08-08 ENCOUNTER — TELEPHONE (OUTPATIENT)
Dept: BARIATRICS | Facility: CLINIC | Age: 30
End: 2025-08-08

## 2025-08-18 ENCOUNTER — TELEPHONE (OUTPATIENT)
Dept: BARIATRICS | Facility: CLINIC | Age: 30
End: 2025-08-18

## 2025-08-19 ENCOUNTER — OFFICE VISIT (OUTPATIENT)
Dept: BARIATRICS | Facility: CLINIC | Age: 30
End: 2025-08-19
Payer: COMMERCIAL

## 2025-08-19 VITALS
BODY MASS INDEX: 36.01 KG/M2 | DIASTOLIC BLOOD PRESSURE: 100 MMHG | HEART RATE: 82 BPM | OXYGEN SATURATION: 98 % | WEIGHT: 280.5 LBS | TEMPERATURE: 97 F | SYSTOLIC BLOOD PRESSURE: 170 MMHG

## 2025-08-19 DIAGNOSIS — E56.9 INADEQUATE VITAMIN INTAKE: ICD-10-CM

## 2025-08-19 DIAGNOSIS — Z48.815 ENCOUNTER FOR SURGICAL AFTERCARE FOLLOWING SURGERY OF DIGESTIVE SYSTEM: Primary | ICD-10-CM

## 2025-08-19 DIAGNOSIS — E56.9 MULTIPLE VITAMIN DEFICIENCY: ICD-10-CM

## 2025-08-19 DIAGNOSIS — Z98.84 BARIATRIC SURGERY STATUS: ICD-10-CM

## 2025-08-19 DIAGNOSIS — K91.2 POSTSURGICAL MALABSORPTION: ICD-10-CM

## 2025-08-19 DIAGNOSIS — E66.812 OBESITY, CLASS II, BMI 35-39.9: ICD-10-CM

## 2025-08-19 PROCEDURE — 99214 OFFICE O/P EST MOD 30 MIN: CPT | Performed by: NURSE PRACTITIONER

## 2025-08-19 RX ORDER — LANOLIN ALCOHOL/MO/W.PET/CERES
1000 CREAM (GRAM) TOPICAL DAILY
Qty: 90 TABLET | Refills: 3 | Status: SHIPPED | OUTPATIENT
Start: 2025-08-19

## 2025-08-19 RX ORDER — GAUZE BANDAGE 2" X 2"
100 BANDAGE TOPICAL 3 TIMES DAILY
Qty: 270 TABLET | Refills: 3 | Status: SHIPPED | OUTPATIENT
Start: 2025-08-19

## 2025-08-19 RX ORDER — FOLIC ACID 1 MG/1
1 TABLET ORAL DAILY
Qty: 90 TABLET | Refills: 3 | Status: SHIPPED | OUTPATIENT
Start: 2025-08-19

## (undated) DEVICE — DECANTER: Brand: UNBRANDED

## (undated) DEVICE — ARM DRAPE

## (undated) DEVICE — BLACK INTELLIGENT RELOAD: Brand: TRI-STAPLE 2.0

## (undated) DEVICE — DISPOSABLE OR TOWEL: Brand: CARDINAL HEALTH

## (undated) DEVICE — TROCAR: Brand: KII FIOS FIRST ENTRY

## (undated) DEVICE — KIT, ROBOTIC BARIATRIC: Brand: CARDINAL HEALTH

## (undated) DEVICE — GLOVE SRG BIOGEL 8

## (undated) DEVICE — CADIERE FORCEPS: Brand: ENDOWRIST

## (undated) DEVICE — TRAVELKIT CONTAINS FIRST STEP KIT (200ML EP-4 KIT) AND SOILED SCOPE BAG - 1 KIT: Brand: TRAVELKIT CONTAINS FIRST STEP KIT AND SOILED SCOPE BAG

## (undated) DEVICE — SUT MONOCRYL 4-0 PS-2 27 IN Y426H

## (undated) DEVICE — SCD SEQUENTIAL COMPRESSION COMFORT SLEEVE MEDIUM KNEE LENGTH: Brand: KENDALL SCD

## (undated) DEVICE — PLUMEPEN PRO 10FT

## (undated) DEVICE — ARTICULATING RELOAD WITH TRI-STAPLE TECHNOLOGY: Brand: ENDO GIA

## (undated) DEVICE — EXOFIN PRECISION PEN HIGH VISCOSITY TOPICAL SKIN ADHESIVE: Brand: EXOFIN PRECISION PEN, 1G

## (undated) DEVICE — VIOLET BRAIDED (POLYGLACTIN 910), SYNTHETIC ABSORBABLE SUTURE: Brand: COATED VICRYL

## (undated) DEVICE — SEAL

## (undated) DEVICE — URETERAL CATHETER ADAPTOR TIP

## (undated) DEVICE — 3000CC GUARDIAN II: Brand: GUARDIAN

## (undated) DEVICE — TROCARS: Brand: KII® OPTICAL ACCESS SYSTEM

## (undated) DEVICE — WEBRIL 6 IN UNSTERILE

## (undated) DEVICE — INSUFFLATION NEEDLE TO ESTABLISH PNEUMOPERITONEUM.: Brand: INSUFFLATION NEEDLE

## (undated) DEVICE — REDUCER: Brand: ENDOWRIST

## (undated) DEVICE — VISUALIZATION SYSTEM: Brand: CLEARIFY

## (undated) DEVICE — VISIGI 3D®  CALIBRATION SYSTEM  SIZE 36FR SLEEVE/STD: Brand: BOEHRINGER® VISIGI 3D™ SLEEVE GASTRECTOMY CALIBRATION SYSTEM, SIZE 36FR

## (undated) DEVICE — CANNULA SEAL

## (undated) DEVICE — DEFENDO AIR WATER SUCTION AND BIOPSY VALVE KIT FOR  OLYMPUS: Brand: DEFENDO AIR/WATER/SUCTION AND BIOPSY VALVE

## (undated) DEVICE — 2000CC GUARDIAN II: Brand: GUARDIAN

## (undated) DEVICE — COLUMN DRAPE

## (undated) DEVICE — TUBING SMOKE EVAC W/FILTRATION DEVICE PLUMEPORT ACTIV

## (undated) DEVICE — BLADELESS OBTURATOR: Brand: WECK VISTA

## (undated) DEVICE — POWER SHELL: Brand: SIGNIA

## (undated) DEVICE — VESSEL SEALER EXTEND: Brand: ENDOWRIST